# Patient Record
Sex: FEMALE | Race: WHITE | Employment: OTHER | URBAN - METROPOLITAN AREA
[De-identification: names, ages, dates, MRNs, and addresses within clinical notes are randomized per-mention and may not be internally consistent; named-entity substitution may affect disease eponyms.]

---

## 2017-02-09 ENCOUNTER — OFFICE VISIT (OUTPATIENT)
Dept: INTERNAL MEDICINE CLINIC | Age: 62
End: 2017-02-09

## 2017-02-09 VITALS
HEART RATE: 88 BPM | OXYGEN SATURATION: 98 % | SYSTOLIC BLOOD PRESSURE: 130 MMHG | BODY MASS INDEX: 32.49 KG/M2 | HEIGHT: 65 IN | TEMPERATURE: 98.5 F | RESPIRATION RATE: 16 BRPM | WEIGHT: 195 LBS | DIASTOLIC BLOOD PRESSURE: 80 MMHG

## 2017-02-09 DIAGNOSIS — R10.32 LLQ ABDOMINAL PAIN: Primary | ICD-10-CM

## 2017-02-09 NOTE — MR AVS SNAPSHOT
Visit Information Date & Time Provider Department Dept. Phone Encounter #  
 2/9/2017  1:30 PM MD Suzanne [facility]gs [facility] [phone] [id number] Follow-up Instructions Return in about 1 day (around 2/10/2017) for F/U tests. Your Appointments 2/28/2017 11:20 AM  
ROUTINE CARE with MD Suzanne Flores [facility] ([address]) Appt Note: 6m f.u; 6m f.u  
 [address] [place] [address]  
[address], [place] [address] [id number] Upcoming Health Maintenance Date Due Pneumococcal 19-64 Highest Risk (1 of 3 - PCV13) 3/9/1976 DTaP/Tdap/Td series (1 - Tdap) 3/9/1978 INFLUENZA AGE 9 TO ADULT 8/1/2016 BREAST CANCER SCRN MAMMOGRAM 8/3/2016 PAP AKA CERVICAL CYTOLOGY 5/1/2017 COLONOSCOPY 2/27/2020 Allergies as of 2/9/2017  Review Complete On: 2/9/2017 By: Santino Castillo MD  
 No Known Allergies Current Immunizations  Reviewed on 12/9/2015 Name Date Influenza Vaccine 9/25/2014, 9/9/2013 Influenza Vaccine (Quad) PF 12/9/2015 Not reviewed this visit You Were Diagnosed With   
  
 Codes Comments LLQ abdominal pain    -  Primary ICD-10-CM: R10.32 
ICD-9-CM: 789.04 Vitals BP Pulse Temp Resp Height(growth percentile) Weight(growth percentile) 130/80 (BP 1 Location: Left arm, BP Patient Position: Sitting) 88 98.5 °F (36.9 °C) (Oral) 16 5' 5\" (1.651 m) 195 lb (88.5 kg) SpO2 BMI OB Status Smoking Status 98% 32.45 kg/m2 Postmenopausal Former Smoker Vitals History BMI and BSA Data Body Mass Index Body Surface Area  
 32.45 kg/m 2 2.01 m 2 Preferred Pharmacy Pharmacy Name Phone [id number], [address] [place] [address] [phone] Your Updated Medication List  
  
   
This list is accurate as of: 2/9/17  2:03 PM.  Always use your most recent med list. ADVIL 200 mg tablet Generic drug:  ibuprofen Take  by mouth. atorvastatin 10 mg tablet Commonly known as:  LIPITOR  
TAKE 1 TABLET BY MOUTH EVERY DAY  
  
 CALTRATE 600 + D PO Take  by mouth daily. citalopram 10 mg tablet Commonly known as:  CELEXA  
TAKE 1 TABLET BY MOUTH EVERY DAY  
  
 doxycycline 100 mg capsule Commonly known as:  VIBRAMYCIN Take 100 mg by mouth daily. famotidine 20 mg tablet Commonly known as:  PEPCID Take 1 Tab by mouth daily (after breakfast). metroNIDAZOLE 0.75 % topical cream  
Commonly known as:  METROCREAM  
Apply  to affected area two (2) times a day. VITAMIN D3 PO Take 1,000 Units by mouth daily. ZyrTEC 10 mg tablet Generic drug:  cetirizine Take  by mouth as needed. We Performed the Following CBC WITH AUTOMATED DIFF [10303 CPT(R)] METABOLIC PANEL, COMPREHENSIVE [59969 CPT(R)] URINALYSIS W/ RFLX MICROSCOPIC [56286 CPT(R)] Follow-up Instructions Return in about 1 day (around 2/10/2017) for F/U tests. To-Do List   
 02/09/2017 Imaging:  US ABD COMP Patient Instructions For your abdominal pain you need to have an ultrasound and blood / urine tests. Introducing [facility]! Cristy Lorenz introduces [employer] patient portal. Now you can access parts of your medical record, email your doctor's office, and request medication refills online. 1. In your internet browser, go to [email or web address] 2. Click on the First Time User? Click Here link in the Sign In box. You will see the New Member Sign Up page. 3. Enter your [employer] Access Code exactly as it appears below. You will not need to use this code after youve completed the sign-up process. If you do not sign up before the expiration date, you must request a new code. · [employer] Access Code: [email or web address] Expires: 5/10/2017  2:02 PM 
 
 4. Enter the last four digits of your Social Security Number (xxxx) and Date of Birth (mm/dd/yyyy) as indicated and click Submit. You will be taken to the next sign-up page. 5. Create a CardKill ID. This will be your CardKill login ID and cannot be changed, so think of one that is secure and easy to remember. 6. Create a CardKill password. You can change your password at any time. 7. Enter your Password Reset Question and Answer. This can be used at a later time if you forget your password. 8. Enter your e-mail address. You will receive e-mail notification when new information is available in [address] 9. Click Sign Up. You can now view and download portions of your medical record. 10. Click the Download Summary menu link to download a portable copy of your medical information. If you have questions, please visit the Frequently Asked Questions section of the [employer] website. Remember, [employer]Kill is NOT to be used for urgent needs. For medical emergencies, dial 911. Now available from your iPhone and Android! Please provide this summary of care documentation to your next provider. Your primary care clinician is listed as Romi Fabian. If you have any questions after today's visit, please call [phone].

## 2017-02-09 NOTE — PROGRESS NOTES
Subjective:     Chief Complaint   Patient presents with    Abdominal Pain     pressure x 3 weeks associated with hip pain      She  is a 63y.o. year old female who presents for evaluation. 3 weeks ago got pain in bilateral lower abdomen (like period pain). Now feels like pressure in lower abdomen and an intermittent sharp pain in LLQ. Had last colonoscopy in 12/2015 and no diverticula reported. When going from sitting to standing has a lot of pain. No change in BM's. No urinary symptoms. No previous GYN surgery except for tubal ligation. Historical Data    Past Medical History   Diagnosis Date    Cancer [facility]) 2005     breast (right)    Lipoma 11/18/2013        Past Surgical History   Procedure Laterality Date    Hx breast lumpectomy       s/p RTx (x7 weeks)    Hx cholecystectomy  1997    Hx tubal ligation  1996    Hx urological  2013     sling    Hx colonoscopy  2009 & 2/27/15     polyps 1st time, 2nd time normal - repeat 5 years - Dr. Paula Real Prescriptions as of 2/9/2017   Medication Sig Dispense Refill    citalopram (CELEXA) 10 mg tablet TAKE 1 TABLET BY MOUTH EVERY DAY 30 Tab 5    atorvastatin (LIPITOR) 10 mg tablet TAKE 1 TABLET BY MOUTH EVERY DAY 30 Tab 5    famotidine (PEPCID) 20 mg tablet Take 1 Tab by mouth daily (after breakfast). 30 Tab 3    ibuprofen (ADVIL) 200 mg tablet Take  by mouth.  CALCIUM CARBONATE/VITAMIN D3 (CALTRATE 600 + D PO) Take  by mouth daily.  CHOLECALCIFEROL, VITAMIN D3, (VITAMIN D3 PO) Take 1,000 Units by mouth daily.  cetirizine (ZYRTEC) 10 mg tablet Take  by mouth as needed.  doxycycline (VIBRAMYCIN) 100 mg capsule Take 100 mg by mouth daily. 2    metroNIDAZOLE (METROCREAM) 0.75 % topical cream Apply  to affected area two (2) times a day. 5     No facility-administered encounter medications on file as of 2/9/2017.          No Known Allergies     Social History     Social History    Marital status:  Spouse name: N/A    Number of children: N/A    Years of education: N/A     Occupational History    Not on file. Social History Main Topics    Smoking status: Former Smoker     Packs/day: 0.50     Years: 15.00     Quit date: 12/13/1993    Smokeless tobacco: Never Used    Alcohol use Yes      Comment: occ    Drug use: No    Sexual activity: Yes     Partners: Male     Other Topics Concern    Not on file     Social History Narrative        Review of Systems  Pertinent items are noted in HPI. Objective:     Vitals:    02/09/17 1345   BP: 130/80   Pulse: 88   Resp: 16   Temp: 98.5 °F (36.9 °C)   TempSrc: Oral   SpO2: 98%   Weight: 195 lb (88.5 kg)   Height: 5' 5\" (1.651 m)     Pleasant female in no acute distress. Cardiac: RRR without murmurs, gallops or rubs. Lungs:  Clear to auscultation. Abdomen:  Slightly distended. Tender to palpation in LLQ with vague fullness but no masses. No rebound. Back: No CVA tenderness. ASSESSMENT / PLAN:   1. LLQ abdominal pain  · Most likely is acute diverticulitis (although none seen on last colonoscopy). · Consider infectious etiologies, ovarian mass. Doubt vascular. - US ABD COMP; Future  - CBC WITH AUTOMATED DIFF  - URINALYSIS W/ RFLX MICROSCOPIC  - METABOLIC PANEL, COMPREHENSIVE    Patient Instructions   For your abdominal pain you need to have an ultrasound and blood / urine tests. Follow-up Disposition:  Return in about 1 day (around 2/10/2017) for F/U tests. Advised her to call back or return to office if symptoms worsen/change/persist.  Discussed expected course/resolution/complications of diagnosis in detail with patient. Medication risks/benefits/costs/interactions/alternatives discussed with patient. She was given an after visit summary which includes diagnoses, current medications, & vitals. She expressed understanding with the diagnosis and plan.

## 2017-02-09 NOTE — PROGRESS NOTES
Chief Complaint   Patient presents with    Abdominal Pain     pressure x 3 weeks associated with hip pain

## 2017-02-10 ENCOUNTER — OFFICE VISIT (OUTPATIENT)
Dept: INTERNAL MEDICINE CLINIC | Age: 62
End: 2017-02-10

## 2017-02-10 ENCOUNTER — HOSPITAL ENCOUNTER (OUTPATIENT)
Dept: ULTRASOUND IMAGING | Age: 62
Discharge: HOME | End: 2017-02-10
Payer: COMMERCIAL

## 2017-02-10 VITALS
HEIGHT: 65 IN | BODY MASS INDEX: 32.15 KG/M2 | DIASTOLIC BLOOD PRESSURE: 80 MMHG | SYSTOLIC BLOOD PRESSURE: 144 MMHG | OXYGEN SATURATION: 97 % | HEART RATE: 75 BPM | TEMPERATURE: 98.5 F | RESPIRATION RATE: 16 BRPM | WEIGHT: 193 LBS

## 2017-02-10 DIAGNOSIS — R10.32 LLQ ABDOMINAL PAIN: ICD-10-CM

## 2017-02-10 DIAGNOSIS — R10.2 SUPRAPUBIC ABDOMINAL PAIN: Primary | ICD-10-CM

## 2017-02-10 LAB
ALBUMIN SERPL-MCNC: 4 G/DL (ref 3.5–5.5)
ALBUMIN/GLOB SERPL: 1.9 {RATIO} (ref 1.1–2.5)
ALP SERPL-CCNC: 118 IU/L (ref 39–117)
ALT SERPL-CCNC: 48 IU/L (ref 0–32)
APPEARANCE UR: CLEAR
AST SERPL-CCNC: 29 IU/L (ref 0–40)
BACTERIA #/AREA URNS HPF: NORMAL /[HPF]
BASOPHILS # BLD AUTO: 0 X10E3/UL (ref 0–0.2)
BASOPHILS NFR BLD AUTO: 1 %
BILIRUB SERPL-MCNC: <0.2 MG/DL (ref 0–1.2)
BILIRUB UR QL STRIP: NEGATIVE
BUN SERPL-MCNC: 15 MG/DL (ref 6–24)
BUN/CREAT SERPL: 25 (ref 9–23)
CALCIUM SERPL-MCNC: 9.2 MG/DL (ref 8.7–10.2)
CASTS URNS QL MICRO: NORMAL /LPF
CHLORIDE SERPL-SCNC: 105 MMOL/L (ref 96–106)
CO2 SERPL-SCNC: 24 MMOL/L (ref 18–29)
COLOR UR: YELLOW
CREAT SERPL-MCNC: 0.61 MG/DL (ref 0.57–1)
EOSINOPHIL # BLD AUTO: 0.1 X10E3/UL (ref 0–0.4)
EOSINOPHIL NFR BLD AUTO: 2 %
EPI CELLS #/AREA URNS HPF: NORMAL /HPF
ERYTHROCYTE [DISTWIDTH] IN BLOOD BY AUTOMATED COUNT: 13.1 % (ref 12.3–15.4)
GLOBULIN SER CALC-MCNC: 2.1 G/DL (ref 1.5–4.5)
GLUCOSE SERPL-MCNC: 111 MG/DL (ref 65–99)
GLUCOSE UR QL: NEGATIVE
HCT VFR BLD AUTO: 43 % (ref 34–46.6)
HGB BLD-MCNC: 14.3 G/DL (ref 11.1–15.9)
HGB UR QL STRIP: NEGATIVE
IMM GRANULOCYTES # BLD: 0 X10E3/UL (ref 0–0.1)
IMM GRANULOCYTES NFR BLD: 0 %
KETONES UR QL STRIP: NEGATIVE
LEUKOCYTE ESTERASE UR QL STRIP: ABNORMAL
LYMPHOCYTES # BLD AUTO: 2.6 X10E3/UL (ref 0.7–3.1)
LYMPHOCYTES NFR BLD AUTO: 40 %
MCH RBC QN AUTO: 31.2 PG (ref 26.6–33)
MCHC RBC AUTO-ENTMCNC: 33.3 G/DL (ref 31.5–35.7)
MCV RBC AUTO: 94 FL (ref 79–97)
MICRO URNS: ABNORMAL
MONOCYTES # BLD AUTO: 0.4 X10E3/UL (ref 0.1–0.9)
MONOCYTES NFR BLD AUTO: 7 %
NEUTROPHILS # BLD AUTO: 3.3 X10E3/UL (ref 1.4–7)
NEUTROPHILS NFR BLD AUTO: 50 %
NITRITE UR QL STRIP: NEGATIVE
PH UR STRIP: 7 [PH] (ref 5–7.5)
PLATELET # BLD AUTO: 265 X10E3/UL (ref 150–379)
POTASSIUM SERPL-SCNC: 3.7 MMOL/L (ref 3.5–5.2)
PROT SERPL-MCNC: 6.1 G/DL (ref 6–8.5)
PROT UR QL STRIP: NEGATIVE
RBC # BLD AUTO: 4.59 X10E6/UL (ref 3.77–5.28)
RBC #/AREA URNS HPF: NORMAL /HPF
SODIUM SERPL-SCNC: 145 MMOL/L (ref 134–144)
SP GR UR: 1.01 (ref 1–1.03)
UROBILINOGEN UR STRIP-MCNC: 0.2 MG/DL (ref 0.2–1)
WBC # BLD AUTO: 6.5 X10E3/UL (ref 3.4–10.8)
WBC #/AREA URNS HPF: NORMAL /HPF

## 2017-02-10 PROCEDURE — 76830 TRANSVAGINAL US NON-OB: CPT

## 2017-02-10 PROCEDURE — 76856 US EXAM PELVIC COMPLETE: CPT

## 2017-02-10 NOTE — PROGRESS NOTES
Subjective:     Chief Complaint   Patient presents with    Abdominal Pain     follow up      She  is a 63y.o. year old female who presents for evaluation. Here for follow up of tests for lower abdominal pain. Blood work analysis was all WNL. US shows some fluid in the endocervical canal and GYN recommended. Historical Data    Past Medical History   Diagnosis Date    Cancer [facility]) 2005     breast (right)    Lipoma 11/18/2013        Past Surgical History   Procedure Laterality Date    Hx breast lumpectomy       s/p RTx (x7 weeks)    Hx cholecystectomy  1997    Hx tubal ligation  1996    Hx urological  2013     sling    Hx colonoscopy  2009 & 2/27/15     polyps 1st time, 2nd time normal - repeat 5 years - Dr. Sonido Saxena Prescriptions as of 2/10/2017   Medication Sig Dispense Refill    citalopram (CELEXA) 10 mg tablet TAKE 1 TABLET BY MOUTH EVERY DAY 30 Tab 5    atorvastatin (LIPITOR) 10 mg tablet TAKE 1 TABLET BY MOUTH EVERY DAY 30 Tab 5    doxycycline (VIBRAMYCIN) 100 mg capsule Take 100 mg by mouth daily. 2    metroNIDAZOLE (METROCREAM) 0.75 % topical cream Apply  to affected area two (2) times a day. 5    famotidine (PEPCID) 20 mg tablet Take 1 Tab by mouth daily (after breakfast). 30 Tab 3    ibuprofen (ADVIL) 200 mg tablet Take  by mouth.  CALCIUM CARBONATE/VITAMIN D3 (CALTRATE 600 + D PO) Take  by mouth daily.  CHOLECALCIFEROL, VITAMIN D3, (VITAMIN D3 PO) Take 1,000 Units by mouth daily.  cetirizine (ZYRTEC) 10 mg tablet Take  by mouth as needed. No facility-administered encounter medications on file as of 2/10/2017. No Known Allergies     Social History     Social History    Marital status:      Spouse name: N/A    Number of children: N/A    Years of education: N/A     Occupational History    Not on file.      Social History Main Topics    Smoking status: Former Smoker     Packs/day: 0.50     Years: 15.00     Quit date: 12/13/1993    Smokeless tobacco: Never Used    Alcohol use Yes      Comment: occ    Drug use: No    Sexual activity: Yes     Partners: Male     Other Topics Concern    Not on file     Social History Narrative        Review of Systems  Pertinent items are noted in HPI. Objective:     Vitals:    02/10/17 1305   BP: 144/80   Pulse: 75   Resp: 16   Temp: 98.5 °F (36.9 °C)   TempSrc: Oral   SpO2: 97%   Weight: 193 lb (87.5 kg)   Height: 5' 5\" (1.651 m)     Not re-examined. ASSESSMENT / PLAN:   1. Suprapubic abdominal pain  · Blood tests show no sign of infection, etc.  · US shows some fluid in endometrial canal   · Refer to GYN      See GYN           Follow-up Disposition:  Return if symptoms worsen or fail to improve. Advised her to call back or return to office if symptoms worsen/change/persist.  Discussed expected course/resolution/complications of diagnosis in detail with patient. Medication risks/benefits/costs/interactions/alternatives discussed with patient. She was given an after visit summary which includes diagnoses, current medications, & vitals. She expressed understanding with the diagnosis and plan.

## 2017-02-28 ENCOUNTER — OFFICE VISIT (OUTPATIENT)
Dept: INTERNAL MEDICINE CLINIC | Age: 62
End: 2017-02-28

## 2017-02-28 VITALS
DIASTOLIC BLOOD PRESSURE: 82 MMHG | BODY MASS INDEX: 32.15 KG/M2 | RESPIRATION RATE: 16 BRPM | WEIGHT: 193 LBS | TEMPERATURE: 97.3 F | SYSTOLIC BLOOD PRESSURE: 132 MMHG | HEIGHT: 65 IN | OXYGEN SATURATION: 98 % | HEART RATE: 65 BPM

## 2017-02-28 DIAGNOSIS — F32.A DEPRESSION, UNSPECIFIED DEPRESSION TYPE: Primary | ICD-10-CM

## 2017-02-28 DIAGNOSIS — E78.2 MIXED HYPERLIPIDEMIA: ICD-10-CM

## 2017-02-28 DIAGNOSIS — R74.8 ABNORMAL LIVER ENZYMES: ICD-10-CM

## 2017-02-28 DIAGNOSIS — R06.83 SNORING: ICD-10-CM

## 2017-02-28 DIAGNOSIS — G47.9 SLEEP DISTURBANCE: ICD-10-CM

## 2017-02-28 DIAGNOSIS — Z79.899 ENCOUNTER FOR LONG-TERM (CURRENT) DRUG USE: ICD-10-CM

## 2017-02-28 DIAGNOSIS — R73.09 ELEVATED GLUCOSE: ICD-10-CM

## 2017-02-28 PROBLEM — E66.9 OBESITY (BMI 30-39.9): Status: ACTIVE | Noted: 2017-02-28

## 2017-02-28 NOTE — MR AVS SNAPSHOT
Visit Information Date & Time Provider Department Dept. Phone Encounter #  
 2/28/2017 11:20 AM Mayte Moise MD [place] [address] [facility] [phone] [id number] Follow-up Instructions Return in about 4 months (around 6/28/2017). Upcoming Health Maintenance Date Due Pneumococcal 19-64 Highest Risk (1 of 3 - PCV13) 3/9/1976 DTaP/Tdap/Td series (1 - Tdap) 3/9/1978 BREAST CANCER SCRN MAMMOGRAM 8/3/2016 PAP AKA CERVICAL CYTOLOGY 5/1/2017 COLONOSCOPY 2/27/2020 Allergies as of 2/28/2017  Review Complete On: 2/28/2017 By: Mayte Moise MD  
 No Known Allergies Current Immunizations  Reviewed on 12/9/2015 Name Date Influenza Vaccine 9/25/2014, 9/9/2013 Influenza Vaccine (Quad) PF 12/9/2015 Not reviewed this visit You Were Diagnosed With   
  
 Codes Comments Depression, unspecified depression type    -  Primary ICD-10-CM: F32.9 ICD-9-CM: 495 Mixed hyperlipidemia     ICD-10-CM: E78.2 ICD-9-CM: 272.2 Elevated glucose     ICD-10-CM: R73.09 
ICD-9-CM: 790.29 Abnormal liver enzymes     ICD-10-CM: R74.8 ICD-9-CM: 790.5 Encounter for long-term (current) drug use     ICD-10-CM: Z79.899 ICD-9-CM: V58.69 Snoring     ICD-10-CM: R06.83 
ICD-9-CM: 786.09 Sleep disturbance     ICD-10-CM: G47.9 ICD-9-CM: 780.50 Vitals BP  
  
  
  
  
  
 132/82 (BP 1 Location: Right arm, BP Patient Position: Sitting) Vitals History BMI and BSA Data Body Mass Index Body Surface Area  
 32.12 kg/m 2 2 m 2 Preferred Pharmacy Pharmacy Name Phone [id number] [place] [address] [phone] Your Updated Medication List  
  
   
This list is accurate as of: 2/28/17 12:08 PM.  Always use your most recent med list. ADVIL 200 mg tablet Generic drug:  ibuprofen Take  by mouth. atorvastatin 10 mg tablet Commonly known as:  LIPITOR TAKE 1 TABLET BY MOUTH EVERY DAY  
  
 CALTRATE 600 + D PO Take  by mouth daily. citalopram 10 mg tablet Commonly known as:  CELEXA  
TAKE 1 TABLET BY MOUTH EVERY DAY  
  
 famotidine 20 mg tablet Commonly known as:  PEPCID Take 1 Tab by mouth daily (after breakfast). VITAMIN D3 PO Take 1,000 Units by mouth daily. ZyrTEC 10 mg tablet Generic drug:  cetirizine Take  by mouth as needed. We Performed the Following SLEEP MEDICINE REFERRAL [PVD392 Custom] Comments:  
 Please evaluate patient for possible sleep apnea. Follow-up Instructions Return in about 4 months (around 6/28/2017). To-Do List   
 02/28/2017 Imaging:  US ABD LTD Referral Information Referral ID Referred By Referred To [id number] JERRELL NAVARRETE Not Available Visits Status Start Date End Date 1 New Request 2/28/17 2/28/18 If your referral has a status of pending review or denied, additional information will be sent to support the outcome of this decision. Referral ID Referred By Referred To [id number] ROSALBA [address] [place] [address], MD  
   [address] [place] [address], [address] Phone: [phone] Fax: [phone] Visits Status Start Date End Date 1 New Request 2/28/17 2/28/18 If your referral has a status of pending review or denied, additional information will be sent to support the outcome of this decision. Introducing [facility]! Kiki Win introduces [employer] patient portal. Now you can access parts of your medical record, email your doctor's office, and request medication refills online. 1. In your internet browser, go to [email or web address] 2. Click on the First Time User? Click Here link in the Sign In box. You will see the New Member Sign Up page. 3. Enter your [employer] Access Code exactly as it appears below.  You will not need to use this code after youve completed the sign-up process. If you do not sign up before the expiration date, you must request a new code. · [employer] Access Code: [email or web address] Expires: 5/10/2017  2:02 PM 
 
4. Enter the last four digits of your Social Security Number (xxxx) and Date of Birth (mm/dd/yyyy) as indicated and click Submit. You will be taken to the next sign-up page. 5. Create a [employer] ID. This will be your [employer] login ID and cannot be changed, so think of one that is secure and easy to remember. 6. Create a [employer] password. You can change your password at any time. 7. Enter your Password Reset Question and Answer. This can be used at a later time if you forget your password. 8. Enter your e-mail address. You will receive e-mail notification when new information is available in [address] 9. Click Sign Up. You can now view and download portions of your medical record. 10. Click the Download Summary menu link to download a portable copy of your medical information. If you have questions, please visit the Frequently Asked Questions section of the [employer] website. Remember, [employer] is NOT to be used for urgent needs. For medical emergencies, dial 911. Now available from your iPhone and Android! Please provide this summary of care documentation to your next provider. Your primary care clinician is listed as Romi Fabian. If you have any questions after today's visit, please call [phone].

## 2017-02-28 NOTE — PROGRESS NOTES
Mio Hernandez is a 63 y.o. female    Chief Complaint   Patient presents with    Follow-up     6 month f/up for hyperlipidemia, elevated glucose, depression     1. Have you been to the ER, urgent care clinic since your last visit? Hospitalized since your last visit? No    2. Have you seen or consulted any other health care providers outside of the [address] [place] [address] since your last visit? Include any pap smears or colon screening.  Yes, Dr. Fide Nichole

## 2017-02-28 NOTE — PROGRESS NOTES
Subjective:      Bryce Dunn is a 63 y.o. female who presents today for follow up of her depression, hyperlipidemia, elevated glucose and also elevated liver enzymes. She saw Dr. Radha Flores a couple weeks ago for suprapubic abdominal pain. Ultrasound showed fluid in the endometrial canal and patient was referred for further evaluation by her gynecologist.  Her gyn is Dr. Akua Herron, but she was not available,  She saw her colleague. Patient did not remember provider's name. Had a uterine biopsy last week with gyn and she was told it was normal.  She was given medrol dose pack and she finished it yesterday and the pain resolved. She thought it was maybe muscular pain. She was told she may need PT. Patient reports of being tired. She is stressed from work. Not sleeping well. Snoring considerably. Her labs done 2 weeks ago also showed that her liver enzymes were still elevated as was her glucose. She states that she does eat a low fat diet and is taking her lipitor daily. No tylenol use or alcohol use. Patient Active Problem List   Diagnosis Code    History of screening mammography Z92.89    Breast cancer ([facility] [address] C50.919    Pap smear for cervical cancer screening Z12.4    Colon cancer screening Z12.11    S/P cholecystectomy Z90.49    S/P tubal ligation Z98.51    Menopausal state N95.1    H/O bone density study Z92.89    Lipoma D17.9    Hyperlipemia E78.5    Depression F32.9    Obesity (BMI 30-39. 9) E66.9     Current Outpatient Prescriptions   Medication Sig Dispense Refill    citalopram (CELEXA) 10 mg tablet TAKE 1 TABLET BY MOUTH EVERY DAY 30 Tab 5    atorvastatin (LIPITOR) 10 mg tablet TAKE 1 TABLET BY MOUTH EVERY DAY 30 Tab 5    CALCIUM CARBONATE/VITAMIN D3 (CALTRATE 600 + D PO) Take  by mouth daily.  CHOLECALCIFEROL, VITAMIN D3, (VITAMIN D3 PO) Take 1,000 Units by mouth daily.  famotidine (PEPCID) 20 mg tablet Take 1 Tab by mouth daily (after breakfast).  30 Tab 3    ibuprofen (ADVIL) 200 mg tablet Take  by mouth.  cetirizine (ZYRTEC) 10 mg tablet Take  by mouth as needed. Review of Systems    Pertinent items are noted in HPI. Objective:     Visit Vitals    /82 (BP 1 Location: Right arm, BP Patient Position: Sitting)    Pulse 65    Temp 97.3 °F (36.3 °C) (Oral)    Resp 16    Ht 5' 5\" (1.651 m)    Wt 193 lb (87.5 kg)    SpO2 98%    BMI 32.12 kg/m2     General appearance: alert, cooperative, no distress, appears stated age  Head: Normocephalic, without obvious abnormality, atraumatic  Neck: supple, symmetrical, trachea midline, no adenopathy, no carotid bruit and no JVD  Lungs: clear to auscultation bilaterally  Heart: regular rate and rhythm, S1, S2 normal, no murmur, click, rub or gallop  Abdomen: soft, non-tender. Bowel sounds normal. No masses,  no organomegaly  Extremities: extremities normal, atraumatic, no cyanosis or edema  Pulses: 2+ and symmetric    Assessment/Plan:     1. Depression, unspecified depression type  -stressed at work  -compliant with use of celexa 10mg daily  -recommended counseling with available counselors at work for coping skills    2. Mixed hyperlipidemia  -LDL 71 noted in labs done in 7/2016 with use of lipitor 10mg daily. 3. Elevated glucose  -encouraged low sugar and carbohydrate diet  -need hemoglobin A1c with next labs    4. Abnormal liver enzymes  -likely fatty liver. Will get ultrasound    - US ABD LTD; Future    5. Encounter for long-term (current) drug use      6. Snoring  -with sleep interruption. Recommended evaluation by sleep specialist.  Likely sleep apnea. - SLEEP MEDICINE REFERRAL    7.  Sleep disturbance    - SLEEP MEDICINE REFERRAL     Orders Placed This Encounter    US ABD LTD     Standing Status:   Future     Standing Expiration Date:   3/28/2018     Order Specific Question:   Specific Body Part     Answer:   liver    SLEEP MEDICINE REFERRAL     Referral Priority:   Routine     Referral Type: Consultation     Referral Reason:   Specialty Services Required     Referred to Provider:   Teresa Luther MD        Follow-up Disposition:     Follow up 4 months      Return if symptoms worsen or fail to improve. Advised patient to call back or return to office if symptoms worsen/change/persist.     Discussed expected course/resolution/complications of diagnosis in detail with patient. Medication risks/benefits/costs/interactions/alternatives discussed with patient. Patient was given an after visit summary which includes diagnoses, current medications, & vitals. Patient expressed understanding with the diagnosis and plan.

## 2017-03-15 ENCOUNTER — HOSPITAL ENCOUNTER (OUTPATIENT)
Dept: ULTRASOUND IMAGING | Age: 62
Discharge: HOME | End: 2017-03-15
Payer: COMMERCIAL

## 2017-03-15 ENCOUNTER — TELEPHONE (OUTPATIENT)
Dept: INTERNAL MEDICINE CLINIC | Age: 62
End: 2017-03-15

## 2017-03-15 DIAGNOSIS — R74.8 ABNORMAL LIVER ENZYMES: ICD-10-CM

## 2017-03-15 PROCEDURE — 76705 ECHO EXAM OF ABDOMEN: CPT

## 2017-03-15 NOTE — TELEPHONE ENCOUNTER
Discussed with patient that her liver showed steatosis. She does not drink alcohol. Plan: encouraged her to work on weight reduction. Maintain low fat diet. She is already on lipitor for hyperlipidemia. Recommended she start vitamin E 400mg daily, but most importantly, she needs to work on reducing her weight.     She has follow up in June, 2017 with me

## 2017-03-20 ENCOUNTER — OFFICE VISIT (OUTPATIENT)
Dept: SLEEP MEDICINE | Age: 62
End: 2017-03-20

## 2017-03-20 VITALS
DIASTOLIC BLOOD PRESSURE: 72 MMHG | OXYGEN SATURATION: 97 % | WEIGHT: 191 LBS | HEART RATE: 74 BPM | BODY MASS INDEX: 31.82 KG/M2 | SYSTOLIC BLOOD PRESSURE: 112 MMHG | HEIGHT: 65 IN

## 2017-03-20 DIAGNOSIS — G47.33 OSA (OBSTRUCTIVE SLEEP APNEA): Primary | ICD-10-CM

## 2017-03-20 DIAGNOSIS — Z86.59 H/O: DEPRESSION: ICD-10-CM

## 2017-03-20 RX ORDER — DOXYCYCLINE HYCLATE 50 MG/1
CAPSULE ORAL
Refills: 2 | COMMUNITY
Start: 2017-03-10 | End: 2017-06-28 | Stop reason: ALTCHOICE

## 2017-03-20 RX ORDER — AMOXICILLIN 500 MG/1
CAPSULE ORAL
Refills: 0 | COMMUNITY
Start: 2017-03-10 | End: 2017-11-29 | Stop reason: ALTCHOICE

## 2017-03-20 NOTE — PATIENT INSTRUCTIONS
[address] [facility] Ravi. [place], [address]  Tel.  [phone]  Fax. 100 [facility] 60  [place], [address] [facility]  Tel.  [phone]  Fax. [phone] [address]  Tel.  [phone]  Fax. [phone]     Sleep Apnea: After Your Visit  Your Care Instructions  Sleep apnea occurs when you frequently stop breathing for 10 seconds or longer during sleep. It can be mild to severe, based on the number of times per hour that you stop breathing or have slowed breathing. Blocked or narrowed airways in your nose, mouth, or throat can cause sleep apnea. Your airway can become blocked when your throat muscles and tongue relax during sleep. Sleep apnea is common, occurring in 1 out of 20 individuals. Individuals having any of the following characteristics should be evaluated and treated right away due to high risk and detrimental consequences from untreated sleep apnea:  1. Obesity  2. Congestive Heart failure  3. Atrial Fibrillation  4. Uncontrolled Hypertension  5. Type II Diabetes  6. Night-time Arrhythmias  7. Stroke  8. Pulmonary Hypertension  9. High-risk Driving Populations (pilots, truck drivers, etc.)  10. Patients Considering Weight-loss Surgery    How do you know you have sleep apnea? You probably have sleep apnea if you answer 'yes' to 3 or more of the following questions:  S - Have you been told that you Snore? T - Are you often Tired during the day? O - Has anyone Observed you stop breathing while sleeping? P- Do you have (or are being treated for) high blood Pressure? B - Are you obese (Body Mass Index > 35)? A - Is your Age 48years old or older? N - Is your Neck size greater than 16 inches? G - Are you male Gender? A sleep physician can prescribe a breathing device that prevents tissues in the throat from blocking your airway.  Or your doctor may recommend using a dental device (oral breathing device) to help keep your airway open. In some cases, surgery may be needed to remove enlarged tissues in the throat. Follow-up care is a key part of your treatment and safety. Be sure to make and go to all appointments, and call your doctor if you are having problems. It's also a good idea to know your test results and keep a list of the medicines you take. How can you care for yourself at home? · Lose weight, if needed. It may reduce the number of times you stop breathing or have slowed breathing. · Go to bed at the same time every night. · Sleep on your side. It may stop mild apnea. If you tend to roll onto your back, sew a pocket in the back of your pajama top. Put a tennis ball into the pocket, and stitch the pocket shut. This will help keep you from sleeping on your back. · Avoid alcohol and medicines such as sleeping pills and sedatives before bed. · Do not smoke. Smoking can make sleep apnea worse. If you need help quitting, talk to your doctor about stop-smoking programs and medicines. These can increase your chances of quitting for good. · Prop up the head of your bed 4 to 6 inches by putting bricks under the legs of the bed. · Treat breathing problems, such as a stuffy nose, caused by a cold or allergies. · Use a continuous positive airway pressure (CPAP) breathing machine if lifestyle changes do not help your apnea and your doctor recommends it. The machine keeps your airway from closing when you sleep. · If CPAP does not help you, ask your doctor whether you should try other breathing machines. A bilevel positive airway pressure machine has two types of air pressureâone for breathing in and one for breathing out. Another device raises or lowers air pressure as needed while you breathe. · If your nose feels dry or bleeds when using one of these machines, talk with your doctor about increasing moisture in the air. A humidifier may help.   · If your nose is runny or stuffy from using a breathing machine, talk with your doctor about using decongestants or a corticosteroid nasal spray. When should you call for help? Watch closely for changes in your health, and be sure to contact your doctor if:  · You still have sleep apnea even though you have made lifestyle changes. · You are thinking of trying a device such as CPAP. · You are having problems using a CPAP or similar machine. Where can you learn more? Go to Zenph. Enter F150 in the search box to learn more about \"Sleep Apnea: After Your Visit. \"   © 4474-7060 Healthwise, [employer]. Care instructions adapted under license by Savanna Cruz (which disclaims liability or warranty for this information). This care instruction is for use with your licensed healthcare professional. If you have questions about a medical condition or this instruction, always ask your healthcare professional. Kim Cowing any warranty or liability for your use of this information. PROPER SLEEP HYGIENE    What to avoid  · Do not have drinks with caffeine, such as coffee or black tea, for 8 hours before bed. · Do not smoke or use other types of tobacco near bedtime. Nicotine is a stimulant and can keep you awake. · Avoid drinking alcohol late in the evening, because it can cause you to wake in the middle of the night. · Do not eat a big meal close to bedtime. If you are hungry, eat a light snack. · Do not drink a lot of water close to bedtime, because the need to urinate may wake you up during the night. · Do not read or watch TV in bed. Use the bed only for sleeping and sexual activity. What to try  · Go to bed at the same time every night, and wake up at the same time every morning. Do not take naps during the day. · Keep your bedroom quiet, dark, and cool. · Get regular exercise, but not within 3 to 4 hours of your bedtime. .  · Sleep on a comfortable pillow and mattress.   · If watching the clock makes you anxious, turn it facing away from you so you cannot see the time. · If you worry when you lie down, start a worry book. Well before bedtime, write down your worries, and then set the book and your concerns aside. · Try meditation or other relaxation techniques before you go to bed. · If you cannot fall asleep, get up and go to another room until you feel sleepy. Do something relaxing. Repeat your bedtime routine before you go to bed again. · Make your house quiet and calm about an hour before bedtime. Turn down the lights, turn off the TV, log off the computer, and turn down the volume on music. This can help you relax after a busy day. Drowsy Driving  The [address] [place] [address] Road Traffic Safety Administration cites drowsiness as a causing factor in more than 553,477 police reported crashes annually, resulting in 76,000 injuries and 1,500 deaths. Other surveys suggest 55% of people polled have driven while drowsy in the past year, 23% had fallen asleep but not crashed, 3% crashed, and 2% had and accident due to drowsy driving. Who is at risk? Young Drivers: One study of drowsy driving accidents states that 55% of the drivers were under 25 years. Of those, 75% were male. Shift Workers and Travelers: People who work overnight or travel across time zones frequently are at higher risk of experiencing Circadian Rhythm Disorders. They are trying to work and function when their body is programed to sleep. Sleep Deprived: Lack of sleep has a serious impact on your ability to pay attention or focus on a task. Consistently getting less than the average of 8 hours your body needs creates partial or cumulative sleep deprivation. Untreated Sleep Disorders: Sleep Apnea, Narcolepsy, R.L.S., and other sleep disorders (untreated) prevent a person from getting enough restful sleep. This leads to excessive daytime sleepiness and increases the risk for drowsy driving accidents by up to 7 times.   Medications / Alcohol: Even over the counter medications can cause drowsiness. Medications that impair a drivers attention should have a warning label. Alcohol naturally makes you sleepy and on its own can cause accidents. Combined with excessive drowsiness its effects are amplified. Signs of Drowsy Driving:   * You don't remember driving the last few miles   * You may drift out of your chichi   * You are unable to focus and your thoughts wander   * You may yawn more often than normal   * You have difficulty keeping your eyes open / nodding off   * Missing traffic signs, speeding, or tailgating  Prevention-   Good sleep hygiene, lifestyle and behavioral choices have the most impact on drowsy driving. There is no substitute for sleep and the average person requires 8 hours nightly. If you find yourself driving drowsy, stop and sleep. Consider the sleep hygiene tips provided during your visit as well. Medication Refill Policy: Refills for all medications require 1 week advance notice. Please have your pharmacy fax a refill request. We are unable to fax, or call in \"controled substance\" medications and you will need to pick these prescriptions up from our office. Agency Spotter Activation    Thank you for requesting access to [employer]. Please follow the instructions below to securely access and download your online medical record. Agency Spotter allows you to send messages to your doctor, view your test results, renew your prescriptions, schedule appointments, and more. How Do I Sign Up? 1. In your internet browser, go to [email or web address] 2. Click on the First Time User? Click Here link in the Sign In box. You will see the New Member Sign Up page. 3. Enter your Agency Spotter Access Code exactly as it appears below. You will not need to use this code after youve completed the sign-up process. If you do not sign up before the expiration date, you must request a new code.     Agency Spotter Access Code: [email or web address]  Expires: 5/10/2017  3:02 PM (This is the date your JumpSoft access code will )    4. Enter the last four digits of your Social Security Number (xxxx) and Date of Birth (mm/dd/yyyy) as indicated and click Submit. You will be taken to the next sign-up page. 5. Create a Arachnot ID. This will be your JumpSoft login ID and cannot be changed, so think of one that is secure and easy to remember. 6. Create a JumpSoft password. You can change your password at any time. 7. Enter your Password Reset Question and Answer. This can be used at a later time if you forget your password. 8. Enter your e-mail address. You will receive e-mail notification when new information is available in [address] 9. Click Sign Up. You can now view and download portions of your medical record. 10. Click the Download Summary menu link to download a portable copy of your medical information. Additional Information    If you have questions, please call [phone]. Remember, JumpSoft is NOT to be used for urgent needs. For medical emergencies, dial 911.

## 2017-03-20 NOTE — PROGRESS NOTES
[address] [facility] [address] R[place]. [place], [address]  Tel.  [phone]  Fax. 100 [facility] 60  [place], 200 [address] [facility]  Tel.  [phone]  Fax. [phone] [facility] [facility]   Tel.  [phone]  Fax. [phone]         Subjective:      Shree Elizabeth Camacho is an 63 y.o. female referred for evaluation for a sleep disorder. She complains of snoring associated with periods of not breathing. Symptoms began 1 year ago, gradually worsening since that time. She usually can fall asleep in 10 minutes. Family or house members note snoring, periods of not breathing. She denies completely or partially paralyzed while falling asleep or waking up. Nhjennyfer Camacho does wake up frequently at night. She is not bothered by waking up too early and left unable to get back to sleep. She actually sleeps about 3 hours at night and wakes up about 3 times during the night. She does not work shifts:  . Briana Block indicates she does get too little sleep at night. Her bedtime is 2200. She awakens at 0530. She does not take naps. She has the following observed behaviors: Loud snoring, Pauses in breathing;  . Other remarks:   She denies of an urge to move extremities due to discomfort or other sensation and denies of dream enactment behavior. [place] Sleepiness Score: 17 which reflect severe daytime drowsiness.     No Known Allergies      Current Outpatient Prescriptions:     amoxicillin (AMOXIL) 500 mg capsule, TAKE 2 CAPSULES NOW THEN 1 CAPSULE 4 TIMES A DAY, Disp: , Rfl: 0    doxycycline (VIBRAMYCIN) 50 mg capsule, TAKE 2 CAPSULES BY MOUTH EVERY DAY, Disp: , Rfl: 2    citalopram (CELEXA) 10 mg tablet, TAKE 1 TABLET BY MOUTH EVERY DAY, Disp: 30 Tab, Rfl: 5    atorvastatin (LIPITOR) 10 mg tablet, TAKE 1 TABLET BY MOUTH EVERY DAY, Disp: 30 Tab, Rfl: 5    ibuprofen (ADVIL) 200 mg tablet, Take  by mouth., Disp: , Rfl:     CALCIUM CARBONATE/VITAMIN D3 (CALTRATE 600 + D PO), Take  by mouth daily. , Disp: , Rfl:     CHOLECALCIFEROL, VITAMIN D3, (VITAMIN D3 PO), Take 1,000 Units by mouth daily. , Disp: , Rfl:     cetirizine (ZYRTEC) 10 mg tablet, Take  by mouth as needed. , Disp: , Rfl:     famotidine (PEPCID) 20 mg tablet, Take 1 Tab by mouth daily (after breakfast). , Disp: 30 Tab, Rfl: 3     She  has a past [facility] of [facility] ([facility]); Ill-defined condition; and Lipoma (11/18/2013). She  has a past surgical history that includes breast lumpectomy; cholecystectomy (1997); tubal ligation (8593); urological (2013); and colonoscopy (2009 & 2/27/15). She family history includes Bleeding Prob in her paternal grandmother; Cancer in her maternal grandmother and paternal grandmother; Coronary Artery Disease in her father; Diabetes in her sister; Heart Attack in her father; Heart Disease in her father and maternal grandfather; Hypertension in her brother, brother, mother, and sister; Lung Disease in her paternal grandfather. There is no history of Anesth Problems. She  reports that she quit smoking about 23 years ago. She has a 7.50 pack-year smoking history. She has never used smokeless tobacco. She reports that she drinks alcohol. She reports that she does not use illicit drugs. Review of Systems:  Constitutional:  No significant weight loss or weight gain  Eyes:  No blurred vision  CVS:  No significant chest pain  Pulm:  No significant shortness of breath  GI:  No significant nausea or vomiting  :  significant nocturia  Musculoskeletal:  No significant joint pain at night  Skin:  No significant rashes  Neuro:  No significant dizziness   Psych:  No active mood issues    Sleep Review of Systems: notable for no difficulty falling asleep; frequent awakenings at night;  regular dreaming noted; no nightmares ; early morning headaches; no memory problems; no concentration issues; no history of any automobile or occupational accidents due to daytime drowsiness.       Objective: Visit Vitals    /72    Pulse 74    Ht 5' 5\" (1.651 m)    Wt 191 lb (86.6 kg)    SpO2 97%    BMI 31.78 kg/m2         General:   Not in acute distress   Eyes:  Anicteric sclerae, no obvious strabismus   Nose:  No obvious nasal septum deviation    Oropharynx:   Class 4 oropharyngeal outlet, thick tongue base, uvula could not be seen due to low-lying soft palate, narrow tonsilo-pharyngeal pilars   Tonsils:   tonsils are not seen due to low-lying soft palate   Neck:   Neck circ. in \"inches\": 14; midline trachea   Chest/Lungs:  Equal lung expansion, clear on auscultation    CVS:  Normal rate, regular rhythm; no JVD   Skin:  Warm to touch; no obvious rashes   Neuro:  No focal deficits ; no obvious tremor    Psych:  Normal affect,  normal countenance;          Assessment:       ICD-10-CM ICD-9-CM    1. SIMRAN (obstructive sleep apnea) G47.33 327.23 POLYSOMNOGRAPHY 1 NIGHT   2. BMI 31.0-31.9,adult Z68.31 V85.31    3. H/O: depression Z86.59 V11.8          Plan:     * The patient currently has a Moderate Risk for having sleep apnea. STOP-BANG score 4.  * Sleep testing was ordered for initial evaluation. * She was provided information on sleep apnea including coresponding risk factors and the importance of proper treatment. * Treatment options if indicated were reviewed today. Patient agrees to a trial of PAP therapy if indicated. * Counseling was provided regarding proper sleep hygiene (including effect of light on sleep), paradoxical intention, stimulus control, sleep environment safety and safe driving. * Effect of sleep disturbance on weight was reviewed. We have recommended a dedicated weight loss through appropriate diet and an exercise regiment as significant weight reduction has been shown to reduce severity of obstructive sleep apnea.      * Telephone [phone]  follow-up shortly after sleep study to review results and plan final management.     (patient has given permission for a message to be left regarding test results and further management if patient cannot be cannot be reached directly). Thank you for allowing us to participate in your patient's medical care. We'll keep you updated on these investigations. Elaine Gaspar MD, FAASM  Diplomate American Board of Sleep Medicine  Diplomate in Sleep Medicine - ABP  Electronically signed.

## 2017-03-21 RX ORDER — ATORVASTATIN CALCIUM 10 MG/1
TABLET, FILM COATED ORAL
Qty: 30 TAB | Refills: 5 | Status: SHIPPED | OUTPATIENT
Start: 2017-03-21 | End: 2017-09-21 | Stop reason: SDUPTHER

## 2017-03-21 RX ORDER — CITALOPRAM 10 MG/1
TABLET ORAL
Qty: 30 TAB | Refills: 5 | Status: SHIPPED | OUTPATIENT
Start: 2017-03-21 | End: 2017-09-21 | Stop reason: SDUPTHER

## 2017-04-28 ENCOUNTER — HOSPITAL ENCOUNTER (OUTPATIENT)
Dept: SLEEP MEDICINE | Age: 62
Discharge: HOME | End: 2017-04-28
Payer: COMMERCIAL

## 2017-04-28 VITALS
DIASTOLIC BLOOD PRESSURE: 76 MMHG | TEMPERATURE: 98.3 F | OXYGEN SATURATION: 96 % | WEIGHT: 190 LBS | SYSTOLIC BLOOD PRESSURE: 123 MMHG | HEART RATE: 74 BPM | BODY MASS INDEX: 29.82 KG/M2 | HEIGHT: 67 IN

## 2017-04-28 DIAGNOSIS — G47.33 OSA (OBSTRUCTIVE SLEEP APNEA): ICD-10-CM

## 2017-04-28 PROCEDURE — 95810 POLYSOM 6/> YRS 4/> PARAM: CPT | Performed by: INTERNAL MEDICINE

## 2017-05-01 ENCOUNTER — TELEPHONE (OUTPATIENT)
Dept: SLEEP MEDICINE | Age: 62
End: 2017-05-01

## 2017-05-01 ENCOUNTER — DOCUMENTATION ONLY (OUTPATIENT)
Dept: SLEEP MEDICINE | Age: 62
End: 2017-05-01

## 2017-05-01 DIAGNOSIS — G47.33 OSA (OBSTRUCTIVE SLEEP APNEA): Primary | ICD-10-CM

## 2017-05-02 PROBLEM — G47.33 OBSTRUCTIVE SLEEP APNEA SYNDROME: Status: ACTIVE | Noted: 2017-05-02

## 2017-06-28 ENCOUNTER — OFFICE VISIT (OUTPATIENT)
Dept: INTERNAL MEDICINE CLINIC | Age: 62
End: 2017-06-28

## 2017-06-28 VITALS
RESPIRATION RATE: 16 BRPM | HEIGHT: 67 IN | BODY MASS INDEX: 29.35 KG/M2 | HEART RATE: 69 BPM | SYSTOLIC BLOOD PRESSURE: 130 MMHG | WEIGHT: 187 LBS | DIASTOLIC BLOOD PRESSURE: 80 MMHG | TEMPERATURE: 97.5 F | OXYGEN SATURATION: 97 %

## 2017-06-28 DIAGNOSIS — Z79.899 ENCOUNTER FOR LONG-TERM (CURRENT) USE OF MEDICATIONS: ICD-10-CM

## 2017-06-28 DIAGNOSIS — E78.2 MIXED HYPERLIPIDEMIA: ICD-10-CM

## 2017-06-28 DIAGNOSIS — R25.2 CRAMPS OF LEFT LOWER EXTREMITY: ICD-10-CM

## 2017-06-28 DIAGNOSIS — F32.A DEPRESSION, UNSPECIFIED DEPRESSION TYPE: Primary | ICD-10-CM

## 2017-06-28 DIAGNOSIS — R73.09 ELEVATED GLUCOSE: ICD-10-CM

## 2017-06-28 PROBLEM — Z71.89 ADVANCE DIRECTIVE DISCUSSED WITH PATIENT: Chronic | Status: ACTIVE | Noted: 2017-06-28

## 2017-06-28 NOTE — PROGRESS NOTES
Subjective:      Jake Florentino is a 63 y.o. female who presents today for follow up of her elevated glucose, depression and hyperlipidemia     She quit her job about 5 months ago due to significant stress that it was causing her. She states that since she quit, she had felt significantly better. Her back pain , especially in her upper back, has improved with decreased stress. She was diagnosed with sleep apnea in may, 2017. She is using an oral device for this disorder. She has had the mouth piece now for one week. It caused some pain in her right lower molar. She has follow up on 7/6/17 for a fitting with the dentist that made her mouth piece. She has had also intermittent left calf cramping. Not related to any activity. No weakness. Resolves spontaneously. She consumes 4 cups of caffeinated beverage daily and about 3-4 glasses of water daily. Patient Active Problem List   Diagnosis Code    History of screening mammography Z92.89    [facility] C50.919    Pap smear for cervical cancer screening Z12.4    Colon cancer screening Z12.11    S/P cholecystectomy Z90.49    S/P tubal ligation Z98.51    Menopausal state N95.1    H/O bone density study Z92.89    Lipoma D17.9    Hyperlipemia E78.5    Depression F32.9    Obesity (BMI 30-39. 9) E66.9    Obstructive sleep apnea syndrome G47.33    Advance directive discussed with patient Z71.89     Current Outpatient Prescriptions   Medication Sig Dispense Refill    varicella zoster vacine live (ZOSTAVAX) 19,400 unit/0.65 mL susr injection 1 Vial by SubCUTAneous route once as needed for up to 1 dose. 0.65 mL 0    atorvastatin (LIPITOR) 10 mg tablet TAKE 1 TABLET BY MOUTH EVERY DAY 30 Tab 5    citalopram (CELEXA) 10 mg tablet TAKE 1 TABLET BY MOUTH EVERY DAY 30 Tab 5    amoxicillin (AMOXIL) 500 mg capsule TAKE 2 CAPSULES NOW THEN 1 CAPSULE 4 TIMES A DAY  0    CALCIUM CARBONATE/VITAMIN D3 (CALTRATE 600 + D PO) Take  by mouth daily.  CHOLECALCIFEROL, VITAMIN D3, (VITAMIN D3 PO) Take 1,000 Units by mouth daily.  cetirizine (ZYRTEC) 10 mg tablet Take  by mouth as needed.  ibuprofen (ADVIL) 200 mg tablet Take  by mouth. Review of Systems    Pertinent items are noted in HPI. Objective:     Visit Vitals    /80 (BP 1 Location: Left arm, BP Patient Position: Sitting)    Pulse 69    Temp 97.5 °F (36.4 °C) (Oral)    Resp 16    Ht 5' 7\" (1.702 m)    Wt 187 lb (84.8 kg)    SpO2 97%    BMI 29.29 kg/m2     General appearance: alert, cooperative, no distress, appears stated age  Head: Normocephalic, without obvious abnormality, atraumatic  Neck: supple, symmetrical, trachea midline, no adenopathy, no carotid bruit and no JVD  Lungs: clear to auscultation bilaterally  Heart: regular rate and rhythm, S1, S2 normal, no murmur, click, rub or gallop  Extremities: extremities normal, atraumatic, no cyanosis or edema  Pulses: 2+ and symmetric    Assessment/Plan:     1. Depression, unspecified depression type  -I evaluated and recommended to continue current doses of medications.   -mood improved with the relief of her work stress.    - CBC WITH AUTOMATED DIFF  - METABOLIC PANEL, COMPREHENSIVE    2. Mixed hyperlipidemia  -due for fasting lipid panel at this time     - CBC WITH AUTOMATED DIFF  - METABOLIC PANEL, COMPREHENSIVE  - LIPID PANEL    3. Elevated glucose  -continue to encouraged low sugar and carbohydrate diet. - METABOLIC PANEL, COMPREHENSIVE  - HEMOGLOBIN A1C WITH EAG  - TSH 3RD GENERATION    4. Cramps of left lower extremity  -encouraged regular stretches and hydration.  -reduce caffeine intake to about  8 ounces per day. 5. Encounter for long-term (current) use of medications    6. Health Care Maintenance    -need shingles vaccine. Script given to patient.      Orders Placed This Encounter    CBC WITH AUTOMATED DIFF    METABOLIC PANEL, COMPREHENSIVE    LIPID PANEL    HEMOGLOBIN A1C WITH EAG    TSH 3RD GENERATION    varicella zoster vacine live (ZOSTAVAX) 19,400 unit/0.65 mL susr injection     Si Vial by SubCUTAneous route once as needed for up to 1 dose. Dispense:  0.65 mL     Refill:  0         Follow-up Disposition:     Follow up in 6 months     Return if symptoms worsen or fail to improve. Advised patient to call back or return to office if symptoms worsen/change/persist.     Discussed expected course/resolution/complications of diagnosis in detail with patient. Medication risks/benefits/costs/interactions/alternatives discussed with patient. Patient was given an after visit summary which includes diagnoses, current medications, & vitals. Patient expressed understanding with the diagnosis and plan.

## 2017-06-28 NOTE — PROGRESS NOTES
Identified pt with two pt identifiers(name and ). Reviewed record in preparation for visit and have obtained necessary documentation. Chief Complaint   Patient presents with    Depression     Room #20    Blood sugar problem     check A1c with labs per last OV note    Leg Pain     intermittent cramping of left calf; pt denies swelling, erythema of LLE       Health Maintenance Due   Topic    Pneumococcal 19-64 Highest Risk (1 of 3 - PCV13)    DTaP/Tdap/Td series (1 - Tdap)    ZOSTER VACCINE AGE 60>     PAP AKA CERVICAL CYTOLOGY    Pt reports pap performed 3/2017. Coordination of Care Questionnaire:  :   1) Have you been to an emergency room, urgent care clinic since your last visit? no   Hospitalized since your last visit? no             2. Have seen or consulted any other health care provider since your last visit? YES  If yes, where when, and reason for visit?    -  3/20/17- present: Sleep center  -  3/2017: mammo and pap done per Dr. Dr. Ina Hayes (gyn)  - U/S done 3/15/17    3) Do you have an Advanced Directive/ Living Will in place? NO  If yes, do we have a copy on file? NO  If no, would you like information? Yes: forms given to pt as requested- pt aware she is to bring back to office once complete to have scanned into EMR    Patient is accompanied by self I have received verbal consent from Lew Cruz to discuss any/all medical information while they are present in the room.

## 2017-06-28 NOTE — MR AVS SNAPSHOT
Visit Information Date & Time Provider Department Dept. Phone Encounter #  
 6/28/2017 11:20 AM Zhanna Madden MD [place] [id number] [facility] [phone] [id number] Follow-up Instructions Return in about 6 months (around 12/28/2017). Your Appointments 9/25/2017  9:20 AM  
Any with López Pryor MD  
[facility] ([facility]) Appt Note: 4 month post dental ref fu  
 [address] [facility] [place]  [id number] [address] [facility] [address] Upcoming Health Maintenance Date Due DTaP/Tdap/Td series (1 - Tdap) 3/9/1978 ZOSTER VACCINE AGE 60> 9/2/2017* INFLUENZA AGE 9 TO ADULT 8/1/2017 BREAST CANCER SCRN MAMMOGRAM 5/1/2018 Pneumococcal 19-64 Highest Risk (2 of 3 - PCV13) 6/28/2018 COLONOSCOPY 2/27/2020 PAP AKA CERVICAL CYTOLOGY 5/1/2020 *Topic was postponed. The date shown is not the original due date. Allergies as of 6/28/2017  Review Complete On: 6/28/2017 By: Rosa Palmer No Known Allergies Current Immunizations  Reviewed on 12/9/2015 Name Date Influenza Vaccine 9/25/2014, 9/9/2013 Influenza Vaccine (Quad) PF 12/9/2015 Not reviewed this visit You Were Diagnosed With   
  
 Codes Comments Depression, unspecified depression type    -  Primary ICD-10-CM: F32.9 ICD-9-CM: 069 Mixed hyperlipidemia     ICD-10-CM: E78.2 ICD-9-CM: 272.2 Elevated glucose     ICD-10-CM: R73.09 
ICD-9-CM: 790.29 Encounter for long-term (current) use of medications     ICD-10-CM: Z79.899 ICD-9-CM: V58.69 Pap smear for cervical cancer screening     ICD-10-CM: Z12.4 ICD-9-CM: V76.2 Advance directive discussed with patient     ICD-10-CM: Z71.89 ICD-9-CM: V65.49 Vitals BP Pulse Temp Resp Height(growth percentile) Weight(growth percentile)  130/80 (BP 1 Location: Left arm, BP Patient Position: Sitting) 69 97.5 °F (36.4 °C) (Oral) 16 5' 7\" (1.702 m) 187 lb (84.8 kg) SpO2 BMI OB Status Smoking Status 97% 29.29 kg/m2 Postmenopausal Former Smoker Vitals History BMI and BSA Data Body Mass Index Body Surface Area  
 29.29 kg/m 2 2 m 2 Preferred Pharmacy Pharmacy Name Phone [id number], [address] [place] [address] [phone] Your Updated Medication List  
  
   
This list is accurate as of: 6/28/17 12:10 PM.  Always use your most recent med list. ADVIL 200 mg tablet Generic drug:  ibuprofen Take  by mouth. amoxicillin 500 mg capsule Commonly known as:  AMOXIL TAKE 2 CAPSULES NOW THEN 1 CAPSULE 4 TIMES A DAY  
  
 atorvastatin 10 mg tablet Commonly known as:  LIPITOR  
TAKE 1 TABLET BY MOUTH EVERY DAY  
  
 CALTRATE 600 + D PO Take  by mouth daily. citalopram 10 mg tablet Commonly known as:  CELEXA  
TAKE 1 TABLET BY MOUTH EVERY DAY  
  
 VITAMIN D3 PO Take 1,000 Units by mouth daily. ZyrTEC 10 mg tablet Generic drug:  cetirizine Take  by mouth as needed. We Performed the Following CBC WITH AUTOMATED DIFF [85689 CPT(R)] HEMOGLOBIN A1C WITH EAG [73222 CPT(R)] LIPID PANEL [64874 CPT(R)] METABOLIC PANEL, COMPREHENSIVE [18122 CPT(R)] TSH 3RD GENERATION [22842 CPT(R)] Follow-up Instructions Return in about 6 months (around 12/28/2017). Introducing [facility]! Shyanne Cisneros introduces [employer] patient portal. Now you can access parts of your medical record, email your doctor's office, and request medication refills online. 1. In your internet browser, go to [email or web address] 2. Click on the First Time User? Click Here link in the Sign In box. You will see the New Member Sign Up page. 3. Enter your [employer] Access Code exactly as it appears below.  You will not need to use this code after youve completed the sign-up process. If you do not sign up before the expiration date, you must request a new code. · [employer] Access Code: [email or web address] Expires: 8/19/2017  2:23 PM 
 
4. Enter the last four digits of your Social Security Number (xxxx) and Date of Birth (mm/dd/yyyy) as indicated and click Submit. You will be taken to the next sign-up page. 5. Create a [employer] ID. This will be your [employer] login ID and cannot be changed, so think of one that is secure and easy to remember. 6. Create a [employer] password. You can change your password at any time. 7. Enter your Password Reset Question and Answer. This can be used at a later time if you forget your password. 8. Enter your e-mail address. You will receive e-mail notification when new information is available in [address] 9. Click Sign Up. You can now view and download portions of your medical record. 10. Click the Download Summary menu link to download a portable copy of your medical information. If you have questions, please visit the Frequently Asked Questions section of the [employer] website. Remember, [employer] is NOT to be used for urgent needs. For medical emergencies, dial 911. Now available from your iPhone and Android! Please provide this summary of care documentation to your next provider. Your primary care clinician is listed as Romi Fabian. If you have any questions after today's visit, please call [phone].

## 2017-06-30 LAB
ALBUMIN SERPL-MCNC: 4 G/DL (ref 3.6–4.8)
ALBUMIN/GLOB SERPL: 1.7 {RATIO} (ref 1.2–2.2)
ALP SERPL-CCNC: 118 IU/L (ref 39–117)
ALT SERPL-CCNC: 48 IU/L (ref 0–32)
AST SERPL-CCNC: 29 IU/L (ref 0–40)
BASOPHILS # BLD AUTO: 0 X10E3/UL (ref 0–0.2)
BASOPHILS NFR BLD AUTO: 0 %
BILIRUB SERPL-MCNC: 0.4 MG/DL (ref 0–1.2)
BUN SERPL-MCNC: 10 MG/DL (ref 8–27)
BUN/CREAT SERPL: 15 (ref 12–28)
CALCIUM SERPL-MCNC: 9.2 MG/DL (ref 8.7–10.3)
CHLORIDE SERPL-SCNC: 104 MMOL/L (ref 96–106)
CHOLEST SERPL-MCNC: 136 MG/DL (ref 100–199)
CO2 SERPL-SCNC: 28 MMOL/L (ref 18–29)
CREAT SERPL-MCNC: 0.66 MG/DL (ref 0.57–1)
EOSINOPHIL # BLD AUTO: 0.1 X10E3/UL (ref 0–0.4)
EOSINOPHIL NFR BLD AUTO: 1 %
ERYTHROCYTE [DISTWIDTH] IN BLOOD BY AUTOMATED COUNT: 12.9 % (ref 12.3–15.4)
EST. AVERAGE GLUCOSE BLD GHB EST-MCNC: 105 MG/DL
GLOBULIN SER CALC-MCNC: 2.3 G/DL (ref 1.5–4.5)
GLUCOSE SERPL-MCNC: 96 MG/DL (ref 65–99)
HBA1C MFR BLD: 5.3 % (ref 4.8–5.6)
HCT VFR BLD AUTO: 47 % (ref 34–46.6)
HDLC SERPL-MCNC: 42 MG/DL
HGB BLD-MCNC: 14.7 G/DL (ref 11.1–15.9)
IMM GRANULOCYTES # BLD: 0 X10E3/UL (ref 0–0.1)
IMM GRANULOCYTES NFR BLD: 0 %
INTERPRETATION, 910389: NORMAL
LDLC SERPL CALC-MCNC: 69 MG/DL (ref 0–99)
LYMPHOCYTES # BLD AUTO: 1.7 X10E3/UL (ref 0.7–3.1)
LYMPHOCYTES NFR BLD AUTO: 36 %
MCH RBC QN AUTO: 30.4 PG (ref 26.6–33)
MCHC RBC AUTO-ENTMCNC: 31.3 G/DL (ref 31.5–35.7)
MCV RBC AUTO: 97 FL (ref 79–97)
MONOCYTES # BLD AUTO: 0.3 X10E3/UL (ref 0.1–0.9)
MONOCYTES NFR BLD AUTO: 6 %
NEUTROPHILS # BLD AUTO: 2.5 X10E3/UL (ref 1.4–7)
NEUTROPHILS NFR BLD AUTO: 57 %
PLATELET # BLD AUTO: 259 X10E3/UL (ref 150–379)
POTASSIUM SERPL-SCNC: 4.1 MMOL/L (ref 3.5–5.2)
PROT SERPL-MCNC: 6.3 G/DL (ref 6–8.5)
RBC # BLD AUTO: 4.83 X10E6/UL (ref 3.77–5.28)
SODIUM SERPL-SCNC: 144 MMOL/L (ref 134–144)
TRIGL SERPL-MCNC: 124 MG/DL (ref 0–149)
TSH SERPL DL<=0.005 MIU/L-ACNC: 3.03 UIU/ML (ref 0.45–4.5)
VLDLC SERPL CALC-MCNC: 25 MG/DL (ref 5–40)
WBC # BLD AUTO: 4.5 X10E3/UL (ref 3.4–10.8)

## 2017-09-21 RX ORDER — ATORVASTATIN CALCIUM 10 MG/1
TABLET, FILM COATED ORAL
Qty: 30 TAB | Refills: 5 | Status: SHIPPED | OUTPATIENT
Start: 2017-09-21 | End: 2018-03-08 | Stop reason: SDUPTHER

## 2017-09-21 RX ORDER — CITALOPRAM 10 MG/1
TABLET ORAL
Qty: 30 TAB | Refills: 5 | Status: SHIPPED | OUTPATIENT
Start: 2017-09-21 | End: 2018-03-08 | Stop reason: SDUPTHER

## 2017-11-28 ENCOUNTER — TELEPHONE (OUTPATIENT)
Dept: INTERNAL MEDICINE CLINIC | Age: 62
End: 2017-11-28

## 2017-11-28 NOTE — TELEPHONE ENCOUNTER
Received physician verification form from pt, today, requesting that it be completed and returned. Form specifically request signature of physician verifying the pt had an annual preventative care exam. After review of the pt's chart, it is noted that she has not had an annual physical exam since Dec. 09, 2015. Therefore, per provider, the form is unable to be completed. Spoke with pt and advised of this information. Fortunately, an available physical slot has opened for tomorrow and the pt has been scheduled. Form will be completed after appointment tomorrow.

## 2017-11-29 ENCOUNTER — OFFICE VISIT (OUTPATIENT)
Dept: INTERNAL MEDICINE CLINIC | Age: 62
End: 2017-11-29

## 2017-11-29 VITALS
OXYGEN SATURATION: 96 % | HEIGHT: 67 IN | WEIGHT: 188 LBS | TEMPERATURE: 98.2 F | DIASTOLIC BLOOD PRESSURE: 78 MMHG | BODY MASS INDEX: 29.51 KG/M2 | SYSTOLIC BLOOD PRESSURE: 116 MMHG | RESPIRATION RATE: 14 BRPM | HEART RATE: 70 BPM

## 2017-11-29 DIAGNOSIS — E78.2 MIXED HYPERLIPIDEMIA: ICD-10-CM

## 2017-11-29 DIAGNOSIS — R73.09 ELEVATED GLUCOSE: ICD-10-CM

## 2017-11-29 DIAGNOSIS — R74.8 ABNORMAL LIVER ENZYMES: ICD-10-CM

## 2017-11-29 DIAGNOSIS — Z00.00 ROUTINE GENERAL MEDICAL EXAMINATION AT A HEALTH CARE FACILITY: Primary | ICD-10-CM

## 2017-11-29 DIAGNOSIS — Z23 ENCOUNTER FOR IMMUNIZATION: ICD-10-CM

## 2017-11-29 RX ORDER — OXYCODONE AND ACETAMINOPHEN 7.5; 325 MG/1; MG/1
TABLET ORAL
Refills: 0 | COMMUNITY
Start: 2017-09-25 | End: 2017-11-29 | Stop reason: ALTCHOICE

## 2017-11-29 RX ORDER — AMOXICILLIN 500 MG/1
CAPSULE ORAL
Refills: 0 | COMMUNITY
Start: 2017-09-25 | End: 2017-11-29 | Stop reason: ALTCHOICE

## 2017-11-29 NOTE — PROGRESS NOTES
Subjective:      Jalen Stokes is a 63 y.o. female who presents today for her annual physical and for biometric forms to be completed. She is without any new concerns at this time. Gyn care is provided by Dr. Leonardo Vaca. - last visit was 5/2017 all stable  Screening mammogram was done at [facility] - date- 5/2017  Screening colonoscopy was last completed with Dr. Harry Reddy, 2/27/15. follow up 5 years  Bone Density testing was completed with Dr. Leonardo Vaca, last 2008    Exercise -  Walk her dogs    Patient Active Problem List   Diagnosis Code    History of screening mammography Z92.89    Breast cancer ([facility] [address] C50.919    Pap smear for cervical cancer screening Z12.4    Colon cancer screening Z12.11    S/P cholecystectomy Z90.49    S/P tubal ligation Z98.51    Menopausal state N95.1    H/O bone density study Z92.89    Lipoma D17.9    Hyperlipemia E78.5    Depression F32.9    Obesity (BMI 30-39. 9) E66.9    Obstructive sleep apnea syndrome G47.33    Advance directive discussed with patient Z71.89     Current Outpatient Prescriptions   Medication Sig Dispense Refill    OMEPRAZOLE (PRILOSEC PO) Take  by mouth daily as needed.  atorvastatin (LIPITOR) 10 mg tablet TAKE 1 TABLET BY MOUTH EVERY DAY 30 Tab 5    citalopram (CELEXA) 10 mg tablet TAKE 1 TABLET BY MOUTH EVERY DAY 30 Tab 5    ibuprofen (ADVIL) 200 mg tablet Take  by mouth.  CALCIUM CARBONATE/VITAMIN D3 (CALTRATE 600 + D PO) Take  by mouth daily.  CHOLECALCIFEROL, VITAMIN D3, (VITAMIN D3 PO) Take 1,000 Units by mouth daily.  cetirizine (ZYRTEC) 10 mg tablet Take  by mouth as needed. Review of Systems    A comprehensive review of systems was negative except for that written in the HPI.      Objective:     Visit Vitals    /78 (BP 1 Location: Right arm, BP Patient Position: Sitting)    Pulse 70    Temp 98.2 °F (36.8 °C) (Oral)    Resp 14    Ht 5' 7\" (1.702 m)    Wt 188 lb (85.3 kg)    SpO2 96%    BMI 29.44 kg/m2 General:  Alert, cooperative, no distress, appears stated age. Head:  Normocephalic, without obvious abnormality, atraumatic. Eyes:  Conjunctivae/corneas clear. PERRL, EOMs intact. Ears:  Normal TMs and external ear canals both ears. Nose: Nares normal. Septum midline. Mucosa normal. No drainage or sinus tenderness. Throat: Lips, mucosa, and tongue normal. Teeth and gums normal.   Neck: Supple, symmetrical, trachea midline, no adenopathy, thyroid: no enlargement/tenderness/nodules, no carotid bruit and no JVD. Back:   Symmetric, no curvature. ROM normal. No CVA tenderness. Lungs:   Clear to auscultation bilaterally. Chest wall:  No tenderness or deformity. Heart:  Regular rate and rhythm, S1, S2 normal, no murmur, click, rub or gallop. Breast Exam:  No tenderness, masses, or nipple abnormality. Abdomen:   Soft, non-tender. Bowel sounds normal. No masses,  No organomegaly. Extremities: Extremities normal, atraumatic, no cyanosis or edema. Pulses: 2+ and symmetric all extremities. Skin: Skin color, texture, turgor normal. No rashes or lesions. Lymph nodes: Cervical, supraclavicular, and axillary nodes normal.   Neurologic: CNII-XII intact. Normal strength, sensation and reflexes throughout. Assessment/Plan:     1. Routine general medical examination at a health care facility  -up to date with gyn care and screening mammogram  -up to date with screening colonoscopy  -recommended DXA at age 72yo. - AMB POC EKG ROUTINE W/ 12 LEADS, INTER & REP  - CBC WITH AUTOMATED DIFF  - METABOLIC PANEL, COMPREHENSIVE  - LIPID PANEL  - URINALYSIS W/ RFLX MICROSCOPIC    Also, she has additional complaints of the following -.     2. Abnormal liver enzymes  -trying to maintain a low fat diet.   -ultrasound of liver showed steatosis. - METABOLIC PANEL, COMPREHENSIVE    3. Elevated glucose  -encouraged a low sugar and carbohydrate diet as well. - HEMOGLOBIN A1C WITH EAG    4. Encounter for immunization  -received her tdap booster today without any complications. - Tetanus, diphtheria toxoids and acellular pertussis (TDAP) vaccine, in individuals >=7 years, IM  - DC IMMUNIZ ADMIN,1 SINGLE/COMB VAC/TOXOID    5. Mixed hyperlipidemia  -compliant with use of lipitor. Orders Placed This Encounter    Tetanus, diphtheria toxoids and acellular pertussis (TDAP) vaccine, in individuals >=7 years, IM    CBC WITH AUTOMATED DIFF    METABOLIC PANEL, COMPREHENSIVE    LIPID PANEL    URINALYSIS W/ RFLX MICROSCOPIC    HEMOGLOBIN A1C WITH EAG    AMB POC EKG ROUTINE W/ 12 LEADS, INTER & REP     Order Specific Question:   Reason for Exam:     Answer:   complete physical    DC IMMUNIZ ADMIN,1 SINGLE/COMB VAC/TOXOID     Follow-up Disposition:     Follow up in 6 months for her hyperlipidemia and depression and GARCES    Return if symptoms worsen or fail to improve. Advised patient to call back or return to office if symptoms worsen/change/persist.     Discussed expected course/resolution/complications of diagnosis in detail with patient. Medication risks/benefits/costs/interactions/alternatives discussed with patient. Patient was given an after visit summary which includes diagnoses, current medications, & vitals. Patient expressed understanding with the diagnosis and plan.

## 2017-11-29 NOTE — PATIENT INSTRUCTIONS
Vaccine Information Statement     Tdap (Tetanus, Diphtheria, Pertussis) Vaccine: What You Need to Know    Many Vaccine Information Statements are available in Mongolian and other languages. See www.immunize.org/vis. Hojas de Información Sobre Vacunas están disponibles en español y en muchos otros idiomas. Visite Karolyn[employer].si    1. Why get vaccinated? Tetanus, diphtheria, and pertussis are very serious diseases. Tdap vaccine can protect us from these diseases. And, Tdap vaccine given to pregnant women can protect  babies against pertussis. TETANUS (Lockjaw) is rare in the [facility] today. It causes painful muscle tightening and stiffness, usually all over the body.  It can lead to tightening of muscles in the head and neck so you cant open your mouth, swallow, or sometimes even breathe. Tetanus kills about 1 out of 10 people who are infected even after receiving the best medical care. DIPHTHERIA is also rare in the [facility] today. It can cause a thick coating to form in the back of the throat.  It can lead to breathing problems, heart failure, paralysis, and death. PERTUSSIS (Whooping Cough) causes severe coughing spells, which can cause difficulty breathing, vomiting, and disturbed sleep.  It can also lead to weight loss, incontinence, and rib fractures. Up to 2 in 100 adolescents and 5 in 100 adults with pertussis are hospitalized or have complications, which could include pneumonia or death. These diseases are caused by bacteria. Diphtheria and pertussis are spread from person to person through secretions from coughing or sneezing. Tetanus enters the body through cuts, scratches, or wounds. Before vaccines, as many as 200,000 cases of diphtheria, 200,000 cases of pertussis, and hundreds of cases of tetanus, were reported in the [place] each year.  Since vaccination began, reports of cases for tetanus and diphtheria have dropped by about 99% and for pertussis by about 80%. 2. Tdap vaccine    Tdap vaccine can protect adolescents and adults from tetanus, diphtheria, and pertussis. One dose of Tdap is routinely given at age 6 or 15. People who did not get Tdap at that age should get it as soon as possible. Tdap is especially important for health care professionals and anyone having close contact with a baby younger than 12 months. Pregnant women should get a dose of Tdap during every pregnancy, to protect the  from pertussis. Infants are most at risk for severe, life-threatening complications from pertussis. Another vaccine, called Td, protects against tetanus and diphtheria, but not pertussis. A Td booster should be given every 10 years. Tdap may be given as one of these boosters if you have never gotten Tdap before. Tdap may also be given after a severe cut or burn to prevent tetanus infection. Your doctor or the person giving you the vaccine can give you more information. Tdap may safely be given at the same time as other vaccines. 3. Some people should not get this vaccine     A person who has ever had a life-threatening allergic reaction after a previous dose of any diphtheria, tetanus or pertussis containing vaccine, OR has a severe allergy to any part of this vaccine, should not get Tdap vaccine. Tell the person giving the vaccine about any severe allergies.  Anyone who had coma or long repeated seizures within 7 days after a childhood dose of DTP or DTaP, or a previous dose of Tdap, should not get Tdap, unless a cause other than the vaccine was found. They can still get Td.  Talk to your doctor if you:  - have seizures or another nervous system problem,  - had severe pain or swelling after any vaccine containing diphtheria, tetanus or pertussis,   - ever had a condition called Guillain Barré Syndrome (GBS),  - arent feeling well on the day the shot is scheduled.     4. Risks    With any medicine, including vaccines, there is a chance of side effects. These are usually mild and go away on their own. Serious reactions are also possible but are rare. Most people who get Tdap vaccine do not have any problems with it. Mild Problems following Tdap  (Did not interfere with activities)   Pain where the shot was given (about 3 in 4 adolescents or 2 in 3 adults)   Redness or swelling where the shot was given (about 1 person in 5)   Mild fever of at least 100.4°F (up to about 1 in 25 adolescents or 1 in 100 adults)   Headache (about 3 or 4 people in 10)   Tiredness (about 1 person in 3 or 4)   Nausea, vomiting, diarrhea, stomach ache (up to 1 in 4 adolescents or 1 in 10 adults)   Chills,  sore joints (about 1 person in 10)   Body aches (about 1 person in 3 or 4)    Rash, swollen glands (uncommon)    Moderate Problems following Tdap  (Interfered with activities, but did not require medical attention)   Pain where the shot was given (up to 1 in 5 or 6)    Redness or swelling where the shot was given (up to about 1 in 16 adolescents or 1 in 12 adults)   Fever over 102°F (about 1 in 100 adolescents or 1 in 250 adults)   Headache (about 1 in 7 adolescents or 1 in 10 adults)   Nausea, vomiting, diarrhea, stomach ache (up to 1 or 3 people in 100)   Swelling of the entire arm where the shot was given (up to about 1 in 500). Severe Problems following Tdap  (Unable to perform usual activities; required medical attention)   Swelling, severe pain, bleeding, and redness in the arm where the shot was given (rare). Problems that could happen after any vaccine:     People sometimes faint after a medical procedure, including vaccination. Sitting or lying down for about 15 minutes can help prevent fainting, and injuries caused by a fall. Tell your doctor if you feel dizzy, or have vision changes or ringing in the ears.      Some people get severe pain in the shoulder and have difficulty moving the arm where a shot was given. This happens very rarely.  Any medication can cause a severe allergic reaction. Such reactions from a vaccine are very rare, estimated at fewer than 1 in a million doses, and would happen within a few minutes to a few hours after the vaccination. As with any medicine, there is a very remote chance of a vaccine causing a serious injury or death. The safety of vaccines is always being monitored. For more information, visit: www.cdc.gov/vaccinesafety/    5. What if there is a serious problem? What should I look for?  Look for anything that concerns you, such as signs of a severe allergic reaction, very high fever, or unusual behavior.  Signs of a severe allergic reaction can include hives, swelling of the face and throat, difficulty breathing, a fast heartbeat, dizziness, and weakness. These would usually start a few minutes to a few hours after the vaccination. What should I do?  If you think it is a severe allergic reaction or other emergency that cant wait, call 9-1-1 or get the person to the nearest hospital. Otherwise, call your doctor.  Afterward, the reaction should be reported to the Vaccine Adverse Event Reporting System (VAERS). Your doctor might file this report, or you can do it yourself through the VAERS web site at www.vaers. [facility].gov, or by calling 9-603.606.2009. VAERS does not give medical advice. 6. The National Vaccine Injury Compensation Program    The [facility] Vaccine Injury Compensation Program (VICP) is a federal program that was created to compensate people who may have been injured by certain vaccines. Persons who believe they may have been injured by a vaccine can learn about the program and about filing a claim by calling 4-333.740.7016 or visiting the [employer] website at www.[facility].gov/vaccinecompensation. There is a time limit to file a claim for compensation. 7. How can I learn more?  Ask your doctor.  He or she can give you the vaccine package insert or suggest other sources of information.  Call your local or state health department.  Contact the Centers for Disease Control and Prevention (CDC):  - Call 0-590.777.2037 (1-800-CDC-INFO) or  - Visit CDCs website at www.cdc.gov/vaccines      Vaccine Information Statement   Tdap Vaccine  (2/24/2015)  [address] [id number]    Department of Health and Human Services  Centers for Disease Control and Prevention    Office Use Only

## 2017-11-29 NOTE — PROGRESS NOTES
Patient's identity verified with two patient identifiers (name and date of birth). 1. Have you been to the ER, urgent care clinic since your last visit? Hospitalized since your last visit? No  2. Have you seen or consulted any other health care providers outside of the [address] [place] [address] since your last visit? Include any pap smears or colon screening. No    Chief Complaint   Patient presents with    Complete Physical     Fasting. Forms for insurance. Fasting. Health Maintenance Due   Topic Date Due    DTaP/Tdap/Td series (1 - Tdap)  Unsure of status. 03/09/1978    Influenza Age 5 to Adult   Done per patient, 10/13/17. 08/01/2017     Reviewed record in preparation for visit and have obtained necessary documentation.     Wt Readings from Last 3 Encounters:   11/29/17 188 lb (85.3 kg)   06/28/17 187 lb (84.8 kg)   04/28/17 190 lb (86.2 kg)     Temp Readings from Last 3 Encounters:   11/29/17 98.2 °F (36.8 °C) (Oral)   06/28/17 97.5 °F (36.4 °C) (Oral)   04/28/17 98.3 °F (36.8 °C)     BP Readings from Last 3 Encounters:   11/29/17 116/78   06/28/17 130/80   04/28/17 123/76     Pulse Readings from Last 3 Encounters:   11/29/17 70   06/28/17 69   04/28/17 74       Depression Screening:  :     PHQ over the last two weeks 11/29/2017   Little interest or pleasure in doing things Not at all   Feeling down, depressed or hopeless Not at all   Total Score PHQ 2 0   Trouble falling or staying asleep, or sleeping too much Not at all   Feeling tired or having little energy Not at all   Poor appetite or overeating Not at all   Feeling bad about yourself - or that you are a failure or have let yourself or your family down Not at all   Trouble concentrating on things such as school, work, reading or watching TV Not at all   Moving or speaking so slowly that other people could have noticed; or the opposite being so fidgety that others notice Not at all   Thoughts of being better off dead, or hurting yourself in some way Not at all   PHQ 9 Score 0     Abuse Screening:  :     Abuse Screening Questionnaire 11/29/2017 12/9/2015 2/28/2014   Do you ever feel afraid of your partner? N N N   Are you in a relationship with someone who physically or mentally threatens you? N N N   Is it safe for you to go home?  Marylen Bath

## 2017-11-29 NOTE — MR AVS SNAPSHOT
Visit Information Date & Time Provider Department Dept. Phone Encounter #  
 11/29/2017 10:00 AM MD [facility] [phone] [id number] Follow-up Instructions Return in about 6 months (around 5/29/2018) for hyperlipidemia, depression. Your Appointments 1/5/2018  9:00 AM  
ROUTINE CARE with MD [facility] ([address]) Appt Note: 6m fu  
 [address], [address] [place] [address]  
[address], [address] [phone] Upcoming Health Maintenance Date Due  
 BREAST CANCER SCRN MAMMOGRAM 5/1/2018 Pneumococcal 19-64 Highest Risk (2 of 3 - PCV13) 6/28/2018 COLONOSCOPY 2/27/2020 PAP AKA CERVICAL CYTOLOGY 5/1/2020 DTaP/Tdap/Td series (2 - Td) 11/29/2027 Allergies as of 11/29/2017  Review Complete On: 11/29/2017 By: Chloé Flores No Known Allergies Current Immunizations  Reviewed on 11/29/2017 Name Date Influenza Vaccine 10/13/2017, 9/25/2014, 9/9/2013 Influenza Vaccine (Quad) PF 12/9/2015 Tdap  Incomplete Reviewed by Reji Shay MD on 11/29/2017 at 10:28 AM  
 Reviewed by Reji Shay MD on 11/29/2017 at 10:31 AM  
You Were Diagnosed With   
  
 Codes Comments Routine general medical examination at a health care facility    -  Primary ICD-10-CM: Z00.00 ICD-9-CM: V70.0 Abnormal liver enzymes     ICD-10-CM: R74.8 ICD-9-CM: 790.5 Elevated glucose     ICD-10-CM: R73.09 
ICD-9-CM: 790.29 Encounter for immunization     ICD-10-CM: T17 ICD-9-CM: V03.89 Mixed hyperlipidemia     ICD-10-CM: E78.2 ICD-9-CM: 272.2 Vitals BP Pulse Temp Resp Height(growth percentile) Weight(growth percentile) 116/78 (BP 1 Location: Right arm, BP Patient Position: Sitting) 70 98.2 °F (36.8 °C) (Oral) 14 5' 7\" (1.702 m) 188 lb (85.3 kg) SpO2 BMI OB Status Smoking Status 96% 29.44 kg/m2 Postmenopausal Former Smoker Vitals History BMI and BSA Data Body Mass Index Body Surface Area  
 29.44 kg/m 2 2.01 m 2 Preferred Pharmacy Pharmacy Name Phone [facility][id number] Medrano, [address] [address] [phone] Your Updated Medication List  
  
   
This list is accurate as of: 11/29/17 10:45 AM.  Always use your most recent med list. ADVIL 200 mg tablet Generic drug:  ibuprofen Take  by mouth. atorvastatin 10 mg tablet Commonly known as:  LIPITOR  
TAKE 1 TABLET BY MOUTH EVERY DAY  
  
 CALTRATE 600 + D PO Take  by mouth daily. citalopram 10 mg tablet Commonly known as:  CELEXA  
TAKE 1 TABLET BY MOUTH EVERY DAY PRILOSEC PO Take  by mouth daily as needed. VITAMIN D3 PO Take 1,000 Units by mouth daily. ZyrTEC 10 mg tablet Generic drug:  cetirizine Take  by mouth as needed. We Performed the Following AMB POC EKG ROUTINE W/ 12 LEADS, INTER & REP [66526 CPT(R)] CBC WITH AUTOMATED DIFF [02409 CPT(R)] HEMOGLOBIN A1C WITH EAG [93891 CPT(R)] LIPID PANEL [59480 CPT(R)] METABOLIC PANEL, COMPREHENSIVE [93505 CPT(R)] GA IMMUNIZ ADMIN,1 SINGLE/COMB VAC/TOXOID [id number] CPT(R)] TETANUS, DIPHTHERIA TOXOIDS AND ACELLULAR PERTUSSIS VACCINE (TDAP), IN INDIVIDS. >=7, IM [id number] CPT(R)] URINALYSIS W/ RFLX MICROSCOPIC [92102 CPT(R)] Follow-up Instructions Return in about 6 months (around 5/29/2018) for hyperlipidemia, depression. Patient Instructions Vaccine Information Statement Tdap (Tetanus, Diphtheria, Pertussis) Vaccine: What You Need to Know Many Vaccine Information Statements are available in Upper sorbian and other languages. See www.immunize.org/vis. Hojas de Información Sobre Vacunas están disponibles en español y en muchos otros idiomas. Visite [email or web address].si 1. Why get vaccinated? Tetanus, diphtheria, and pertussis are very serious diseases. Tdap vaccine can protect us from these diseases. And, Tdap vaccine given to pregnant women can protect  babies against pertussis. TETANUS (Lockjaw) is rare in the [facility] today. It causes painful muscle tightening and stiffness, usually all over the body. ? It can lead to tightening of muscles in the head and neck so you cant open your mouth, swallow, or sometimes even breathe. Tetanus kills about 1 out of 10 people who are infected even after receiving the best medical care. DIPHTHERIA is also rare in the [facility] today. It can cause a thick coating to form in the back of the throat. ? It can lead to breathing problems, heart failure, paralysis, and death. PERTUSSIS (Whooping Cough) causes severe coughing spells, which can cause difficulty breathing, vomiting, and disturbed sleep. ? It can also lead to weight loss, incontinence, and rib fractures. Up to 2 in 100 adolescents and 5 in 100 adults with pertussis are hospitalized or have complications, which could include pneumonia or death. These diseases are caused by bacteria. Diphtheria and pertussis are spread from person to person through secretions from coughing or sneezing. Tetanus enters the body through cuts, scratches, or wounds. Before vaccines, as many as 200,000 cases of diphtheria, 200,000 cases of pertussis, and hundreds of cases of tetanus, were reported in the [place] each year. Since vaccination began, reports of cases for tetanus and diphtheria have dropped by about 99% and for pertussis by about 80%. 2. Tdap vaccine Tdap vaccine can protect adolescents and adults from tetanus, diphtheria, and pertussis. One dose of Tdap is routinely given at age 6 or 15. People who did not get Tdap at that age should get it as soon as possible.  
 
Tdap is especially important for health care professionals and anyone having close contact with a baby younger than 12 months. Pregnant women should get a dose of Tdap during every pregnancy, to protect the  from pertussis. Infants are most at risk for severe, life-threatening complications from pertussis. Another vaccine, called Td, protects against tetanus and diphtheria, but not pertussis. A Td booster should be given every 10 years. Tdap may be given as one of these boosters if you have never gotten Tdap before. Tdap may also be given after a severe cut or burn to prevent tetanus infection. Your doctor or the person giving you the vaccine can give you more information. Tdap may safely be given at the same time as other vaccines. 3. Some people should not get this vaccine  A person who has ever had a life-threatening allergic reaction after a previous dose of any diphtheria, tetanus or pertussis containing vaccine, OR has a severe allergy to any part of this vaccine, should not get Tdap vaccine. Tell the person giving the vaccine about any severe allergies.  Anyone who had coma or long repeated seizures within 7 days after a childhood dose of DTP or DTaP, or a previous dose of Tdap, should not get Tdap, unless a cause other than the vaccine was found. They can still get Td.  Talk to your doctor if you: 
- have seizures or another nervous system problem, 
- had severe pain or swelling after any vaccine containing diphtheria, tetanus or pertussis,  
- ever had a condition called Guillain Barré Syndrome (GBS), 
- arent feeling well on the day the shot is scheduled. 4. Risks With any medicine, including vaccines, there is a chance of side effects. These are usually mild and go away on their own. Serious reactions are also possible but are rare. Most people who get Tdap vaccine do not have any problems with it. Mild Problems following Tdap 
(Did not interfere with activities)  Pain where the shot was given (about 3 in 4 adolescents or 2 in 3 adults)  Redness or swelling where the shot was given (about 1 person in 5)  Mild fever of at least 100.4°F (up to about 1 in 25 adolescents or 1 in 100 adults)  Headache (about 3 or 4 people in 10)  Tiredness (about 1 person in 3 or 4)  Nausea, vomiting, diarrhea, stomach ache (up to 1 in 4 adolescents or 1 in 10 adults)  Chills,  sore joints (about 1 person in 10)  Body aches (about 1 person in 3 or 4)  Rash, swollen glands (uncommon) Moderate Problems following Tdap (Interfered with activities, but did not require medical attention)  Pain where the shot was given (up to 1 in 5 or 6)  Redness or swelling where the shot was given (up to about 1 in 16 adolescents or 1 in 12 adults)  Fever over 102°F (about 1 in 100 adolescents or 1 in 250 adults)  Headache (about 1 in 7 adolescents or 1 in 10 adults)  Nausea, vomiting, diarrhea, stomach ache (up to 1 or 3 people in 100)  Swelling of the entire arm where the shot was given (up to about 1 in 500). Severe Problems following Tdap 
(Unable to perform usual activities; required medical attention)  Swelling, severe pain, bleeding, and redness in the arm where the shot was given (rare). Problems that could happen after any vaccine:  People sometimes faint after a medical procedure, including vaccination. Sitting or lying down for about 15 minutes can help prevent fainting, and injuries caused by a fall. Tell your doctor if you feel dizzy, or have vision changes or ringing in the ears.  Some people get severe pain in the shoulder and have difficulty moving the arm where a shot was given. This happens very rarely.  Any medication can cause a severe allergic reaction. Such reactions from a vaccine are very rare, estimated at fewer than 1 in a million doses, and would happen within a few minutes to a few hours after the vaccination. As with any medicine, there is a very remote chance of a vaccine causing a serious injury or death. The safety of vaccines is always being monitored. For more information, visit: www.cdc.gov/vaccinesafety/ 
 
 
The [facility] Gene Vaccine Injury Compensation Program (VICP) is a federal program that was created to compensate people who may have been injured by certain vaccines. Persons who believe they may have been injured by a vaccine can learn about the program and about filing a claim by calling 3-246.338.9001 or visiting the [employer] website at www.[facility].gov/vaccinecompensation. There is a time limit to file a claim for compensation. 7. How can I learn more?  Ask your doctor. He or she can give you the vaccine package insert or suggest other sources of information.  Call your local or state health department.  Contact the Centers for Disease Control and Prevention (CDC): 
- Call 9-735.267.5187 (1-800-CDC-INFO) or 
- Visit CDCs website at www.cdc.gov/vaccines Vaccine Information Statement Tdap Vaccine 
(2/24/2015) 42 UKaelyn Bales 249OW-13 Department of [facility] and [employer] Centers for Disease Control and Prevention Office Use Only Introducing [facility]! Savanna Anthony introduces [employer] patient portal. Now you can access parts of your medical record, email your doctor's office, and request medication refills online. 1. In your internet browser, go to https://[email or web address]. [employer]yard/[employer] 2. Click on the First Time User? Click Here link in the Sign In box. You will see the New Member Sign Up page. 3. Enter your [employer] Access Code exactly as it appears below. You will not need to use this code after youve completed the sign-up process. If you do not sign up before the expiration date, you must request a new code. · [employer] Access Code: [email or web address] Expires: 2/27/2018 10:45 AM 
 
4. Enter the last four digits of your Social Security Number (xxxx) and Date of Birth (mm/dd/yyyy) as indicated and click Submit. You will be taken to the next sign-up page. 5. Create a [employer] ID. This will be your [employer] login ID and cannot be changed, so think of one that is secure and easy to remember. 6. Create a [employer] password. You can change your password at any time. 7. Enter your Password Reset Question and Answer. This can be used at a later time if you forget your password. 8. Enter your e-mail address. You will receive e-mail notification when new information is available in [address] 9. Click Sign Up. You can now view and download portions of your medical record. 10. Click the Download Summary menu link to download a portable copy of your medical information. If you have questions, please visit the Frequently Asked Questions section of the [employer] website. Remember, [employer] is NOT to be used for urgent needs. For medical emergencies, dial 911. Now available from your iPhone and Android! Please provide this summary of care documentation to your next provider. Your primary care clinician is listed as Romi Fabian. If you have any questions after today's visit, please call [phone].

## 2017-12-01 LAB
ALBUMIN SERPL-MCNC: 4.3 G/DL (ref 3.6–4.8)
ALBUMIN/GLOB SERPL: 1.9 {RATIO} (ref 1.2–2.2)
ALP SERPL-CCNC: 140 IU/L (ref 39–117)
ALT SERPL-CCNC: 54 IU/L (ref 0–32)
APPEARANCE UR: CLEAR
AST SERPL-CCNC: 31 IU/L (ref 0–40)
BASOPHILS # BLD AUTO: 0 X10E3/UL (ref 0–0.2)
BASOPHILS NFR BLD AUTO: 1 %
BILIRUB SERPL-MCNC: 0.4 MG/DL (ref 0–1.2)
BILIRUB UR QL STRIP: NEGATIVE
BUN SERPL-MCNC: 12 MG/DL (ref 8–27)
BUN/CREAT SERPL: 18 (ref 12–28)
CALCIUM SERPL-MCNC: 9 MG/DL (ref 8.7–10.3)
CHLORIDE SERPL-SCNC: 104 MMOL/L (ref 96–106)
CHOLEST SERPL-MCNC: 141 MG/DL (ref 100–199)
CO2 SERPL-SCNC: 26 MMOL/L (ref 18–29)
COLOR UR: YELLOW
CREAT SERPL-MCNC: 0.68 MG/DL (ref 0.57–1)
EOSINOPHIL # BLD AUTO: 0.1 X10E3/UL (ref 0–0.4)
EOSINOPHIL NFR BLD AUTO: 1 %
ERYTHROCYTE [DISTWIDTH] IN BLOOD BY AUTOMATED COUNT: 13.6 % (ref 12.3–15.4)
EST. AVERAGE GLUCOSE BLD GHB EST-MCNC: 114 MG/DL
GFR SERPLBLD CREATININE-BSD FMLA CKD-EPI: 110 ML/MIN/1.73
GFR SERPLBLD CREATININE-BSD FMLA CKD-EPI: 95 ML/MIN/1.73
GLOBULIN SER CALC-MCNC: 2.3 G/DL (ref 1.5–4.5)
GLUCOSE SERPL-MCNC: 101 MG/DL (ref 65–99)
GLUCOSE UR QL: NEGATIVE
HBA1C MFR BLD: 5.6 % (ref 4.8–5.6)
HCT VFR BLD AUTO: 44.6 % (ref 34–46.6)
HDLC SERPL-MCNC: 44 MG/DL
HGB BLD-MCNC: 14.5 G/DL (ref 11.1–15.9)
HGB UR QL STRIP: NEGATIVE
IMM GRANULOCYTES # BLD: 0 X10E3/UL (ref 0–0.1)
IMM GRANULOCYTES NFR BLD: 0 %
INTERPRETATION, 910389: NORMAL
KETONES UR QL STRIP: NEGATIVE
LDLC SERPL CALC-MCNC: 82 MG/DL (ref 0–99)
LEUKOCYTE ESTERASE UR QL STRIP: NEGATIVE
LYMPHOCYTES # BLD AUTO: 2 X10E3/UL (ref 0.7–3.1)
LYMPHOCYTES NFR BLD AUTO: 36 %
MCH RBC QN AUTO: 29.8 PG (ref 26.6–33)
MCHC RBC AUTO-ENTMCNC: 32.5 G/DL (ref 31.5–35.7)
MCV RBC AUTO: 92 FL (ref 79–97)
MICRO URNS: NORMAL
MONOCYTES # BLD AUTO: 0.3 X10E3/UL (ref 0.1–0.9)
MONOCYTES NFR BLD AUTO: 6 %
NEUTROPHILS # BLD AUTO: 3.2 X10E3/UL (ref 1.4–7)
NEUTROPHILS NFR BLD AUTO: 56 %
NITRITE UR QL STRIP: NEGATIVE
PH UR STRIP: 6.5 [PH] (ref 5–7.5)
PLATELET # BLD AUTO: 273 X10E3/UL (ref 150–379)
POTASSIUM SERPL-SCNC: 4.1 MMOL/L (ref 3.5–5.2)
PROT SERPL-MCNC: 6.6 G/DL (ref 6–8.5)
PROT UR QL STRIP: NEGATIVE
RBC # BLD AUTO: 4.86 X10E6/UL (ref 3.77–5.28)
SODIUM SERPL-SCNC: 145 MMOL/L (ref 134–144)
SP GR UR: 1.03 (ref 1–1.03)
TRIGL SERPL-MCNC: 76 MG/DL (ref 0–149)
UROBILINOGEN UR STRIP-MCNC: 0.2 MG/DL (ref 0.2–1)
VLDLC SERPL CALC-MCNC: 15 MG/DL (ref 5–40)
WBC # BLD AUTO: 5.5 X10E3/UL (ref 3.4–10.8)

## 2018-03-08 RX ORDER — ATORVASTATIN CALCIUM 10 MG/1
TABLET, FILM COATED ORAL
Qty: 30 TAB | Refills: 5 | Status: SHIPPED | OUTPATIENT
Start: 2018-03-08 | End: 2018-10-21 | Stop reason: SDUPTHER

## 2018-03-08 RX ORDER — CITALOPRAM 10 MG/1
TABLET ORAL
Qty: 30 TAB | Refills: 5 | Status: SHIPPED | OUTPATIENT
Start: 2018-03-08 | End: 2018-10-21 | Stop reason: SDUPTHER

## 2018-05-31 ENCOUNTER — OFFICE VISIT (OUTPATIENT)
Dept: INTERNAL MEDICINE CLINIC | Age: 63
End: 2018-05-31

## 2018-05-31 VITALS
DIASTOLIC BLOOD PRESSURE: 82 MMHG | HEART RATE: 67 BPM | TEMPERATURE: 97.9 F | SYSTOLIC BLOOD PRESSURE: 124 MMHG | WEIGHT: 182 LBS | OXYGEN SATURATION: 96 % | BODY MASS INDEX: 28.56 KG/M2 | HEIGHT: 67 IN | RESPIRATION RATE: 14 BRPM

## 2018-05-31 DIAGNOSIS — F32.A DEPRESSION, UNSPECIFIED DEPRESSION TYPE: Primary | ICD-10-CM

## 2018-05-31 DIAGNOSIS — K76.0 FATTY LIVER: ICD-10-CM

## 2018-05-31 DIAGNOSIS — E78.2 MIXED HYPERLIPIDEMIA: ICD-10-CM

## 2018-05-31 DIAGNOSIS — Z79.899 ENCOUNTER FOR LONG-TERM (CURRENT) USE OF MEDICATIONS: ICD-10-CM

## 2018-05-31 RX ORDER — METRONIDAZOLE 7.5 MG/G
CREAM TOPICAL
Refills: 3 | COMMUNITY
Start: 2018-04-03 | End: 2019-10-28 | Stop reason: ALTCHOICE

## 2018-05-31 RX ORDER — DOXYCYCLINE HYCLATE 50 MG/1
CAPSULE ORAL
Refills: 5 | COMMUNITY
Start: 2018-04-03 | End: 2018-10-23 | Stop reason: ALTCHOICE

## 2018-05-31 NOTE — MR AVS SNAPSHOT
[address] [facility], [address] [place] [address] 
[phone] Patient: Darian Winston MRN: [id number] ESJ:[birth year] Visit Information Date & Time Provider Department Dept. Phone Encounter #  
 5/31/2018  8:20 AM Francesca Rosen MD [place] [address] [facility] [phone] [id number] Follow-up Instructions Return in about 6 months (around 11/30/2018) for hyperlipidemia, depression. Upcoming Health Maintenance Date Due Pneumococcal 19-64 Highest Risk (2 of 3 - PCV13) 6/28/2018 Influenza Age 5 to Adult 8/1/2018 BREAST CANCER SCRN MAMMOGRAM 3/23/2019 COLONOSCOPY 2/27/2020 PAP AKA CERVICAL CYTOLOGY 5/1/2020 DTaP/Tdap/Td series (2 - Td) 11/29/2027 Allergies as of 5/31/2018  Review Complete On: 5/31/2018 By: Francesca Rosen MD  
 No Known Allergies Current Immunizations  Reviewed on 11/29/2017 Name Date Influenza Vaccine 10/13/2017, 9/25/2014, 9/9/2013 Influenza Vaccine (Quad) PF 12/9/2015 Tdap 11/29/2017 Not reviewed this visit You Were Diagnosed With   
  
 Codes Comments Depression, unspecified depression type    -  Primary ICD-10-CM: F32.9 ICD-9-CM: 764 Mixed hyperlipidemia     ICD-10-CM: E78.2 ICD-9-CM: 272.2 Encounter for long-term (current) use of medications     ICD-10-CM: Z79.899 ICD-9-CM: V58.69 Vitals BP Pulse Temp Resp Height(growth percentile) Weight(growth percentile) 124/82 (BP 1 Location: Left arm, BP Patient Position: Sitting) 67 97.9 °F (36.6 °C) (Oral) 14 5' 7\" (1.702 m) 182 lb (82.6 kg) SpO2 BMI OB Status Smoking Status 96% 28.51 kg/m2 Postmenopausal Former Smoker Vitals History BMI and BSA Data Body Mass Index Body Surface Area 28.51 kg/m 2 1.98 m 2 Preferred Pharmacy Pharmacy Name Phone [facility] #[id number] [address] [place] [address] [phone] Your Updated Medication List  
  
   
This list is accurate as of 5/31/18  8:52 AM.  Always use your most recent med list. ADVIL 200 mg tablet Generic drug:  ibuprofen Take  by mouth every eight (8) hours as needed. atorvastatin 10 mg tablet Commonly known as:  LIPITOR  
TAKE 1 TABLET BY MOUTH EVERY DAY  
  
 CALTRATE 600 + D PO Take  by mouth daily. citalopram 10 mg tablet Commonly known as:  CELEXA  
TAKE 1 TABLET BY MOUTH EVERY DAY  
  
 doxycycline 50 mg capsule Commonly known as:  VIBRAMYCIN  
TAKE ONE CAPSULE BY MOUTH TWICE A DAY  
  
 metroNIDAZOLE 0.75 % topical cream  
Commonly known as:  METROCREAM  
APPLY CREAM TO FACE TWO TIMES DAILY PRILOSEC PO Take  by mouth daily as needed. VITAMIN D3 PO Take 1,000 Units by mouth daily. ZyrTEC 10 mg tablet Generic drug:  cetirizine Take  by mouth as needed. We Performed the Following LIPID PANEL [51109 CPT(R)] METABOLIC PANEL, COMPREHENSIVE [29693 CPT(R)] Follow-up Instructions Return in about 6 months (around 11/30/2018) for hyperlipidemia, depression. Introducing [facility]! [address] introduces [employer] patient portal. Now you can access parts of your medical record, email your doctor's office, and request medication refills online. 1. In your internet browser, go to [email or web address]://[email or web address] Jump or Fall/[employer]) 2. Click on the First Time User? Click Here link in the Sign In box. You will see the New Member Sign Up page. 3. Enter your [employer] Access Code exactly as it appears below. You will not need to use this code after youve completed the sign-up process. If you do not sign up before the expiration date, you must request a new code. · [employer] Access Code: [email or web address] Expires: 8/29/2018  8:52 AM 
 
4. Enter the last four digits of your Social Security Number (xxxx) and Date of Birth (mm/dd/yyyy) as indicated and click Submit.  You will be taken to the next sign-up page. 5. Create a [employer] ID. This will be your [employer] login ID and cannot be changed, so think of one that is secure and easy to remember. 6. Create a [employer] password. You can change your password at any time. 7. Enter your Password Reset Question and Answer. This can be used at a later time if you forget your password. 8. Enter your e-mail address. You will receive e-mail notification when new information is available in [address] 9. Click Sign Up. You can now view and download portions of your medical record. 10. Click the Download Summary menu link to download a portable copy of your medical information. If you have questions, please visit the Frequently Asked Questions section of the [employer] website. Remember, [employer] is NOT to be used for urgent needs. For medical emergencies, dial 911. Now available from your iPhone and Android! Please provide this summary of care documentation to your next provider. Your primary care clinician is listed as Romi Fabian. If you have any questions after today's visit, please call [phone].

## 2018-05-31 NOTE — PROGRESS NOTES
Patient's identity verified with two patient identifiers (name and date of birth). Reviewed record in preparation for visit and have obtained necessary documentation. 1. Have you been to the ER, urgent care clinic since your last visit? Hospitalized since your last visit? No  2. Have you seen or consulted any other health care providers outside of the [address] [place] [address] since your last visit? Include any pap smears or colon screening. No    Chief Complaint   Patient presents with    Depression     6 month follow up    Cholesterol Problem     6 month follow up    Other     Would like to update advanced care plan/living will. Fasting. There are no preventive care reminders to display for this patient.     Wt Readings from Last 3 Encounters:   05/31/18 182 lb (82.6 kg)   11/29/17 188 lb (85.3 kg)   06/28/17 187 lb (84.8 kg)     Temp Readings from Last 3 Encounters:   05/31/18 97.9 °F (36.6 °C) (Oral)   11/29/17 98.2 °F (36.8 °C) (Oral)   06/28/17 97.5 °F (36.4 °C) (Oral)     BP Readings from Last 3 Encounters:   05/31/18 124/82   11/29/17 116/78   06/28/17 130/80     Pulse Readings from Last 3 Encounters:   05/31/18 67   11/29/17 70   06/28/17 69       Learning Assessment:  :     Learning Assessment 5/31/2018 3/20/2017 12/3/2014 2/28/2014   PRIMARY LEARNER Patient Patient Patient Patient   HIGHEST LEVEL OF EDUCATION - PRIMARY LEARNER  4 YEARS OF COLLEGE - 4 YEARS OF COLLEGE 4 YEARS OF COLLEGE   BARRIERS PRIMARY LEARNER NONE - NONE -   CO-LEARNER CAREGIVER No - No -   PRIMARY LANGUAGE ENGLISH ENGLISH Bermudian Bermudian    NEED - - No -   LEARNER PREFERENCE PRIMARY DEMONSTRATION DEMONSTRATION DEMONSTRATION OTHER (COMMENT)   LEARNING SPECIAL TOPICS - - no -   ANSWERED BY patient patient patient patient   RELATIONSHIP SELF SELF SELF SELF       Depression Screening:  :     PHQ over the last two weeks 5/31/2018   Little interest or pleasure in doing things Not at all   Feeling down, depressed or hopeless Not at all   Total Score PHQ 2 0   Trouble falling or staying asleep, or sleeping too much -   Feeling tired or having little energy -   Poor appetite or overeating -   Feeling bad about yourself - or that you are a failure or have let yourself or your family down -   Trouble concentrating on things such as school, work, reading or watching TV -   Moving or speaking so slowly that other people could have noticed; or the opposite being so fidgety that others notice -   Thoughts of being better off dead, or hurting yourself in some way -   PHQ 9 Score -       Fall Risk Assessment:  :     Fall Risk Assessment, last 12 mths 5/31/2018   Able to walk? Yes   Fall in past 12 months? No       Abuse Screening:  :     Abuse Screening Questionnaire 5/31/2018 11/29/2017 12/9/2015 2/28/2014   Do you ever feel afraid of your partner? N N N N   Are you in a relationship with someone who physically or mentally threatens you? N N N N   Is it safe for you to go home?  Sara Moore

## 2018-05-31 NOTE — PROGRESS NOTES
Subjective:      Teresa Vu is a 66 y.o. female who presents today for follow up of her depression and hyperlipidemia and elevated liver enzymes. She has been working on eating less fat in her diet. Has lost about 5 pounds since her last visit in the fall. She denies any chest pain, shortness of breath, syncope, headaches, nausea/vomiting, or any bowel changes. Patient Active Problem List   Diagnosis Code    History of screening mammography Z92.89    [facility]    Pap smear for cervical cancer screening Z12.4    Colon cancer screening Z12.11    S/P cholecystectomy Z90.49    S/P tubal ligation Z98.51    Menopausal state N95.1    H/O bone density study Z92.89    Hyperlipemia E78.5    Depression F32.9    Obesity (BMI 30-39. 9) E66.9    Obstructive sleep apnea syndrome G47.33    Advance directive discussed with patient Z71.89    Fatty liver K76.0     Current Outpatient Prescriptions   Medication Sig Dispense Refill    doxycycline (VIBRAMYCIN) 50 mg capsule TAKE ONE CAPSULE BY MOUTH TWICE A DAY  5    metroNIDAZOLE (METROCREAM) 0.75 % topical cream APPLY CREAM TO FACE TWO TIMES DAILY  3    atorvastatin (LIPITOR) 10 mg tablet TAKE 1 TABLET BY MOUTH EVERY DAY 30 Tab 5    citalopram (CELEXA) 10 mg tablet TAKE 1 TABLET BY MOUTH EVERY DAY 30 Tab 5    OMEPRAZOLE (PRILOSEC PO) Take  by mouth daily as needed.  ibuprofen (ADVIL) 200 mg tablet Take  by mouth every eight (8) hours as needed.  CALCIUM CARBONATE/VITAMIN D3 (CALTRATE 600 + D PO) Take  by mouth daily.  CHOLECALCIFEROL, VITAMIN D3, (VITAMIN D3 PO) Take 1,000 Units by mouth daily.  cetirizine (ZYRTEC) 10 mg tablet Take  by mouth as needed. Review of Systems    Pertinent items are noted in HPI.      Objective:     Visit Vitals    /82 (BP 1 Location: Left arm, BP Patient Position: Sitting)    Pulse 67    Temp 97.9 °F (36.6 °C) (Oral)    Resp 14    Ht 5' 7\" (1.702 m)    Wt 182 lb (82.6 kg)    SpO2 96%    BMI 28.51 kg/m2     General appearance: alert, cooperative, no distress, appears stated age  Head: Normocephalic, without obvious abnormality, atraumatic  Neck: supple, symmetrical, trachea midline, no adenopathy, no carotid bruit and no JVD  Lungs: clear to auscultation bilaterally  Heart: regular rate and rhythm, S1, S2 normal, no murmur, click, rub or gallop  Abdomen: soft, non-tender. Bowel sounds normal. No masses,  no organomegaly  Extremities: extremities normal, atraumatic, no cyanosis or edema  Pulses: 2+ and symmetric    Assessment/Plan:     1. Depression, unspecified depression type  -I evaluated and recommended to continue current doses of medications.     - METABOLIC PANEL, COMPREHENSIVE    2. Mixed hyperlipidemia  -encouraged continuation of low fat diet. - METABOLIC PANEL, COMPREHENSIVE  - LIPID PANEL    3. Encounter for long-term (current) use of medications    - METABOLIC PANEL, COMPREHENSIVE  - LIPID PANEL    4. Fatty liver  -encouraged maintaining a low fat diet.   -check lft's at this time. 5.  Health Care Maintenance    -patient would like to update her AD. She had done her previous one on the [place] [facility]. [facility] to [facility]. Will scan to chart once available. Orders Placed This Encounter    METABOLIC PANEL, COMPREHENSIVE    LIPID PANEL     Follow-up Disposition:     Follow up in 6 months     Return if symptoms worsen or fail to improve. Advised patient to call back or return to office if symptoms worsen/change/persist.     Discussed expected course/resolution/complications of diagnosis in detail with patient. Medication risks/benefits/costs/interactions/alternatives discussed with patient. Patient was given an after visit summary which includes diagnoses, current medications, & vitals. Patient expressed understanding with the diagnosis and plan.

## 2018-06-01 ENCOUNTER — TELEPHONE (OUTPATIENT)
Dept: INTERNAL MEDICINE CLINIC | Age: 63
End: 2018-06-01

## 2018-06-01 LAB
ALBUMIN SERPL-MCNC: 4.1 G/DL (ref 3.6–4.8)
ALBUMIN/GLOB SERPL: 1.9 {RATIO} (ref 1.2–2.2)
ALP SERPL-CCNC: 127 IU/L (ref 39–117)
ALT SERPL-CCNC: 78 IU/L (ref 0–32)
AST SERPL-CCNC: 42 IU/L (ref 0–40)
BILIRUB SERPL-MCNC: 0.5 MG/DL (ref 0–1.2)
BUN SERPL-MCNC: 13 MG/DL (ref 8–27)
BUN/CREAT SERPL: 18 (ref 12–28)
CALCIUM SERPL-MCNC: 9.5 MG/DL (ref 8.7–10.3)
CHLORIDE SERPL-SCNC: 106 MMOL/L (ref 96–106)
CHOLEST SERPL-MCNC: 143 MG/DL (ref 100–199)
CO2 SERPL-SCNC: 24 MMOL/L (ref 18–29)
CREAT SERPL-MCNC: 0.71 MG/DL (ref 0.57–1)
GFR SERPLBLD CREATININE-BSD FMLA CKD-EPI: 106 ML/MIN/1.73
GFR SERPLBLD CREATININE-BSD FMLA CKD-EPI: 92 ML/MIN/1.73
GLOBULIN SER CALC-MCNC: 2.2 G/DL (ref 1.5–4.5)
GLUCOSE SERPL-MCNC: 112 MG/DL (ref 65–99)
HDLC SERPL-MCNC: 40 MG/DL
INTERPRETATION, 910389: NORMAL
LDLC SERPL CALC-MCNC: 79 MG/DL (ref 0–99)
POTASSIUM SERPL-SCNC: 3.8 MMOL/L (ref 3.5–5.2)
PROT SERPL-MCNC: 6.3 G/DL (ref 6–8.5)
SODIUM SERPL-SCNC: 144 MMOL/L (ref 134–144)
TRIGL SERPL-MCNC: 119 MG/DL (ref 0–149)
VLDLC SERPL CALC-MCNC: 24 MG/DL (ref 5–40)

## 2018-06-01 NOTE — TELEPHONE ENCOUNTER
Call to Dimas Masters - she states that she can not add the requested testing (A1C) because they only received a serum gel top tube. No further action needed at this time.

## 2018-06-01 NOTE — TELEPHONE ENCOUNTER
----- Message from Ferny Serrano MD sent at 6/1/2018  1:51 PM EDT -----  Please have [facility] add a hemoglobin A1c to her labs done on 5/31/18. Diagnosis is elevated glucose.      Thanks  JIMMIE

## 2018-06-04 NOTE — PROGRESS NOTES
Need to reduce sugars and carbohydrates in diet. lft's increased again. U/S done 3/2017 showed hepatic steatosis.

## 2018-10-22 RX ORDER — CITALOPRAM 10 MG/1
TABLET ORAL
Qty: 30 TAB | Refills: 5 | Status: SHIPPED | OUTPATIENT
Start: 2018-10-22 | End: 2018-10-26 | Stop reason: SDUPTHER

## 2018-10-22 RX ORDER — ATORVASTATIN CALCIUM 10 MG/1
TABLET, FILM COATED ORAL
Qty: 30 TAB | Refills: 5 | Status: SHIPPED | OUTPATIENT
Start: 2018-10-22 | End: 2018-10-26 | Stop reason: SDUPTHER

## 2018-10-23 ENCOUNTER — OFFICE VISIT (OUTPATIENT)
Dept: INTERNAL MEDICINE CLINIC | Age: 63
End: 2018-10-23

## 2018-10-23 VITALS
HEIGHT: 67 IN | DIASTOLIC BLOOD PRESSURE: 84 MMHG | SYSTOLIC BLOOD PRESSURE: 132 MMHG | TEMPERATURE: 97.7 F | BODY MASS INDEX: 29.19 KG/M2 | OXYGEN SATURATION: 96 % | WEIGHT: 186 LBS | RESPIRATION RATE: 13 BRPM | HEART RATE: 61 BPM

## 2018-10-23 DIAGNOSIS — F32.A DEPRESSION, UNSPECIFIED DEPRESSION TYPE: ICD-10-CM

## 2018-10-23 DIAGNOSIS — K75.81 NASH (NONALCOHOLIC STEATOHEPATITIS): ICD-10-CM

## 2018-10-23 DIAGNOSIS — R74.8 ABNORMAL LIVER ENZYMES: ICD-10-CM

## 2018-10-23 DIAGNOSIS — Z79.899 ENCOUNTER FOR LONG-TERM (CURRENT) USE OF MEDICATIONS: ICD-10-CM

## 2018-10-23 DIAGNOSIS — R73.09 ELEVATED GLUCOSE: ICD-10-CM

## 2018-10-23 DIAGNOSIS — Z00.00 ROUTINE GENERAL MEDICAL EXAMINATION AT A HEALTH CARE FACILITY: Primary | ICD-10-CM

## 2018-10-23 RX ORDER — AMOXICILLIN 500 MG/1
CAPSULE ORAL
Refills: 12 | COMMUNITY
Start: 2018-10-10 | End: 2019-10-28 | Stop reason: ALTCHOICE

## 2018-10-23 NOTE — PROGRESS NOTES
Patient's identity verified with two patient identifiers (name and date of birth). Reviewed record in preparation for visit and have obtained necessary documentation. 1. Have you been to the ER, urgent care clinic since your last visit? Hospitalized since your last visit? No  2. Have you seen or consulted any other health care providers outside of the [address] [place] [address] since your last visit? Include any pap smears or colon screening. No    Chief Complaint   Patient presents with    Complete Physical     Fasting. No EKG today per PCP. Has insurance forms for her and her  today.  Depression     6 month follow up    Cholesterol Problem     6 month follow up      Fasting. Health Maintenance Due   Topic Date Due    Shingrix Vaccine Age 49> (1 of 2)  Patient reports has not had. 03/09/2007    Pneumococcal 19-64 Highest Risk (2 of 3 - PCV13)  Patient reports has not had. 06/28/2018    Influenza Age 9 to Adult   Done per pt, x1wk, [place].  08/01/2018       Wt Readings from Last 3 Encounters:   10/23/18 186 lb (84.4 kg)   05/31/18 182 lb (82.6 kg)   11/29/17 188 lb (85.3 kg)     Temp Readings from Last 3 Encounters:   10/23/18 97.7 °F (36.5 °C) (Oral)   05/31/18 97.9 °F (36.6 °C) (Oral)   11/29/17 98.2 °F (36.8 °C) (Oral)     BP Readings from Last 3 Encounters:   10/23/18 132/84   05/31/18 124/82   11/29/17 116/78     Pulse Readings from Last 3 Encounters:   10/23/18 61   05/31/18 67   11/29/17 70

## 2018-10-23 NOTE — PROGRESS NOTES
Subjective:      Selvin Phillips is a 66 y.o. female who presents today for follow up of her depression, elevated glucose and GARCES. She is also present for her annual exam with a form to be completed. Gyn care is provided by Dr. Merline Kroner. - last visit was 5/2017  Screening mammogram was done at [facility] - date- 3/23/18  Screening colonoscopy was last completed with Dr. Jorge Phan on 2/27/15. Follow up in 5 years. Bone Density testing - being done with Dr. Rohit Diamond intermittently. Patient Active Problem List   Diagnosis Code    History of screening mammography Z92.89    Breast cancer ([facility] C50.919    Pap smear for cervical cancer screening Z12.4    Colon cancer screening Z12.11    S/P cholecystectomy Z90.49    S/P tubal ligation Z98.51    Menopausal state N95.1    H/O bone density study Z92.89    Hyperlipemia E78.5    Depression F32.9    Obesity (BMI 30-39. 9) E66.9    Obstructive sleep apnea syndrome G47.33    Advance directive discussed with patient Z71.89    Fatty liver K76.0     Current Outpatient Medications   Medication Sig Dispense Refill    amoxicillin (AMOXIL) 500 mg capsule TAKE ONE CAPSULE BY MOUTH DAILY WITH FOOD & WATER  12    atorvastatin (LIPITOR) 10 mg tablet TAKE 1 TABLET BY MOUTH EVERY DAY 30 Tab 5    citalopram (CELEXA) 10 mg tablet TAKE 1 TABLET BY MOUTH EVERY DAY 30 Tab 5    metroNIDAZOLE (METROCREAM) 0.75 % topical cream APPLY CREAM TO FACE TWO TIMES DAILY  3    OMEPRAZOLE (PRILOSEC PO) Take  by mouth daily as needed.  ibuprofen (ADVIL) 200 mg tablet Take  by mouth every eight (8) hours as needed.  CALCIUM CARBONATE/VITAMIN D3 (CALTRATE 600 + D PO) Take  by mouth daily.  CHOLECALCIFEROL, VITAMIN D3, (VITAMIN D3 PO) Take 1,000 Units by mouth daily.  cetirizine (ZYRTEC) 10 mg tablet Take  by mouth daily as needed. Review of Systems    A comprehensive review of systems was negative except for that written in the HPI. Objective: Wt Readings from Last 3 Encounters:   10/23/18 186 lb (84.4 kg)   05/31/18 182 lb (82.6 kg)   11/29/17 188 lb (85.3 kg)     Temp Readings from Last 3 Encounters:   10/23/18 97.7 °F (36.5 °C) (Oral)   05/31/18 97.9 °F (36.6 °C) (Oral)   11/29/17 98.2 °F (36.8 °C) (Oral)     BP Readings from Last 3 Encounters:   10/23/18 132/84   05/31/18 124/82   11/29/17 116/78     Pulse Readings from Last 3 Encounters:   10/23/18 61   05/31/18 67   11/29/17 70       Visit Vitals  /84 (BP 1 Location: Left arm, BP Patient Position: Sitting)   Pulse 61   Temp 97.7 °F (36.5 °C) (Oral)   Resp 13   Ht 5' 7\" (1.702 m)   Wt 186 lb (84.4 kg)   SpO2 96%   BMI 29.13 kg/m²     General:  Alert, cooperative, no distress, appears stated age. Head:  Normocephalic, without obvious abnormality, atraumatic. Eyes:  Conjunctivae/corneas clear. PERRL, EOMs intact. .   Ears:  Normal TMs and external ear canals both ears. Nose: Nares normal. Septum midline. Mucosa normal. No drainage or sinus tenderness. Throat: Lips, mucosa, and tongue normal. Teeth and gums normal.   Neck: Supple, symmetrical, trachea midline, no adenopathy, thyroid: no enlargement/tenderness/nodules, no carotid bruit and no JVD. Back:   Symmetric, no curvature. ROM normal. No CVA tenderness. Lungs:   Clear to auscultation bilaterally. Chest wall:  No tenderness or deformity. Heart:  Regular rate and rhythm, S1, S2 normal, no murmur, click, rub or gallop. Breast Exam:  No tenderness, masses, or nipple abnormality. Abdomen:   Soft, non-tender. Bowel sounds normal. No masses,  No organomegaly. Extremities: Extremities normal, atraumatic, no cyanosis or edema. Pulses: 2+ and symmetric all extremities. Skin: Skin color, texture, turgor normal. No rashes or lesions. Lymph nodes: Cervical, supraclavicular, and axillary nodes normal.   Neurologic: CNII-XII intact. Normal strength, sensation and reflexes throughout. Assessment/Plan:     1. Routine general medical examination at a health care facility  -fasting labs today.  -wellness form completed and returned to patient today. - AMB POC EKG ROUTINE W/ 12 LEADS, INTER & REP  - CBC WITH AUTOMATED DIFF  - METABOLIC PANEL, COMPREHENSIVE  - LIPID PANEL  - HEMOGLOBIN A1C WITH EAG  - UA/M W/RFLX CULTURE, ROUTINE    Also, she has additional complaints of the following --.     2. Depression, unspecified depression type  -I evaluated and recommended to continue current doses of medications. - CBC WITH AUTOMATED DIFF  - METABOLIC PANEL, COMPREHENSIVE    3. Elevated glucose  -continue to work on weight reduction and diet modifications.     - METABOLIC PANEL, COMPREHENSIVE    4. Abnormal liver enzymes  -due to GARCES. Check levels today. - METABOLIC PANEL, COMPREHENSIVE    5. GARCES (nonalcoholic steatohepatitis)  -last liver ultrasound done in 2/28/17  -discussed with patient that if her liver enzymes continues to be persistently elevated. Will refer to liver clinic.   -strongly encouraged low fat diet. - LIPID PANEL    6. Encounter for long-term (current) use of medications    - CBC WITH AUTOMATED DIFF  - METABOLIC PANEL, COMPREHENSIVE  - LIPID PANEL  - HEMOGLOBIN A1C WITH EAG  - UA/M W/RFLX CULTURE, ROUTINE     Orders Placed This Encounter    CBC WITH AUTOMATED DIFF    METABOLIC PANEL, COMPREHENSIVE    LIPID PANEL    HEMOGLOBIN A1C WITH EAG    UA/M W/RFLX CULTURE, ROUTINE    AMB POC EKG ROUTINE W/ 12 LEADS, INTER & REP     Order Specific Question:   Reason for Exam:     Answer:   complete physical     Follow-up Disposition:     Follow up in 6 months     Return if symptoms worsen or fail to improve. Advised patient to call back or return to office if symptoms worsen/change/persist.     Discussed expected course/resolution/complications of diagnosis in detail with patient. Medication risks/benefits/costs/interactions/alternatives discussed with patient. Patient was given an after visit summary which includes diagnoses, current medications, & vitals. Patient expressed understanding with the diagnosis and plan.

## 2018-10-23 NOTE — LETTER
10/30/2018 4:47 PM 
 
Ms. Jalen Abdul 
[id number] [facility] [address] Dear Pearl Hernandez: 
 
Please find your most recent results below. Resulted Orders CBC WITH AUTOMATED DIFF Result Value Ref Range WBC 4.8 3.4 - 10.8 x10E3/uL  
 RBC 4.77 3.77 - 5.28 x10E6/uL HGB 14.5 11.1 - 15.9 g/dL HCT 44.3 34.0 - 46.6 % MCV 93 79 - 97 fL  
 MCH 30.4 26.6 - 33.0 pg  
 MCHC 32.7 31.5 - 35.7 g/dL  
 RDW 13.4 12.3 - 15.4 % PLATELET 775 042 - 027 x10E3/uL NEUTROPHILS 53 Not Estab. % Lymphocytes 41 Not Estab. % MONOCYTES 5 Not Estab. % EOSINOPHILS 1 Not Estab. % BASOPHILS 0 Not Estab. %  
 ABS. NEUTROPHILS 2.6 1.4 - 7.0 x10E3/uL Abs Lymphocytes 2.0 0.7 - 3.1 x10E3/uL  
 ABS. MONOCYTES 0.3 0.1 - 0.9 x10E3/uL  
 ABS. EOSINOPHILS 0.0 0.0 - 0.4 x10E3/uL  
 ABS. BASOPHILS 0.0 0.0 - 0.2 x10E3/uL IMMATURE GRANULOCYTES 0 Not Estab. %  
 ABS. IMM. GRANS. 0.0 0.0 - 0.1 x10E3/uL Narrative Performed at:  [address]  [id number] : Samson Drake MD, Phone:  [phone] METABOLIC PANEL, COMPREHENSIVE Result Value Ref Range Glucose 96 65 - 99 mg/dL BUN 13 8 - 27 mg/dL Creatinine 0.64 0.57 - 1.00 mg/dL GFR est non-AA 97 >59 mL/min/1.73 GFR est  >59 mL/min/1.73  
 BUN/Creatinine ratio 20 12 - 28 Sodium 144 134 - 144 mmol/L Potassium 4.0 3.5 - 5.2 mmol/L Chloride 106 96 - 106 mmol/L  
 CO2 25 20 - 29 mmol/L Calcium 9.2 8.7 - 10.3 mg/dL Protein, total 6.3 6.0 - 8.5 g/dL Albumin 4.2 3.6 - 4.8 g/dL GLOBULIN, TOTAL 2.1 1.5 - 4.5 g/dL A-G Ratio 2.0 1.2 - 2.2 Bilirubin, total 0.5 0.0 - 1.2 mg/dL Alk. phosphatase 130 (H) 39 - 117 IU/L  
 AST (SGOT) 32 0 - 40 IU/L  
 ALT (SGPT) 52 (H) 0 - 32 IU/L Narrative Performed at:  [address]  [id number] : Samson Drake MD, Phone:  [phone] LIPID PANEL  
 Result Value Ref Range Cholesterol, total 118 100 - 199 mg/dL Triglyceride 75 0 - 149 mg/dL HDL Cholesterol 42 >39 mg/dL VLDL, calculated 15 5 - 40 mg/dL LDL, calculated 61 0 - 99 mg/dL Narrative Performed at:  [address] [place] [address]  [id number] : Allison David MD, Phone:  [phone] HEMOGLOBIN A1C WITH EAG Result Value Ref Range Hemoglobin A1c 5.4 4.8 - 5.6 % Comment:  
            Prediabetes: 5.7 - 6.4 Diabetes: >6.4 Glycemic control for adults with diabetes: <7.0 Estimated average glucose 108 mg/dL Narrative Performed at:  [address] [place] [address]  [id number] : Allison David MD, Phone:  [phone] UA/M W/RFLX CULTURE, ROUTINE Result Value Ref Range Specific Gravity 1.022 1.005 - 1.030  
 pH (UA) 6.5 5.0 - 7.5 Color Yellow Yellow Appearance Clear Clear Leukocyte Esterase Negative Negative Protein Negative Negative/Trace Glucose Negative Negative Ketone Negative Negative Blood Negative Negative Bilirubin Negative Negative Urobilinogen 1.0 0.2 - 1.0 mg/dL Nitrites Negative Negative Microscopic Examination Comment Comment:  
   Microscopic follows if indicated. Microscopic exam See additional order Comment:  
   Microscopic was indicated and was performed. URINALYSIS REFLEX Comment Comment: This specimen will not reflex to a Urine Culture. Narrative Performed at:  [address] [place] [address]  [id number] : Allison David MD, Phone:  [phone] MICROSCOPIC EXAMINATION Result Value Ref Range WBC 0-5 0 - 5 /hpf  
 RBC 0-2 0 - 2 /hpf Epithelial cells 0-10 0 - 10 /hpf Casts None seen None seen /lpf Mucus Present Not Estab. Bacteria None seen None seen/Few Narrative Performed at:  [address] [place] [address] [facility] [address] [place] [address]  [id number] : Franco Mccarthy MD, Phone:  [phone] RECOMMENDATIONS: 
Your labs are all stable. Your glucose is controlled, but still in the prediabetes range. Your ALT, or liver enzyme, is still slightly elevated. Please continue to work on weight reduction and maintaining a low fat diet. No medication changes at this time. Please call me if you have any questions: [phone] Sincerely, Judson Godinez MD

## 2018-10-24 LAB
ALBUMIN SERPL-MCNC: 4.2 G/DL (ref 3.6–4.8)
ALBUMIN/GLOB SERPL: 2 {RATIO} (ref 1.2–2.2)
ALP SERPL-CCNC: 130 IU/L (ref 39–117)
ALT SERPL-CCNC: 52 IU/L (ref 0–32)
APPEARANCE UR: CLEAR
AST SERPL-CCNC: 32 IU/L (ref 0–40)
BACTERIA #/AREA URNS HPF: NORMAL /[HPF]
BASOPHILS # BLD AUTO: 0 X10E3/UL (ref 0–0.2)
BASOPHILS NFR BLD AUTO: 0 %
BILIRUB SERPL-MCNC: 0.5 MG/DL (ref 0–1.2)
BILIRUB UR QL STRIP: NEGATIVE
BUN SERPL-MCNC: 13 MG/DL (ref 8–27)
BUN/CREAT SERPL: 20 (ref 12–28)
CALCIUM SERPL-MCNC: 9.2 MG/DL (ref 8.7–10.3)
CASTS URNS QL MICRO: NORMAL /LPF
CHLORIDE SERPL-SCNC: 106 MMOL/L (ref 96–106)
CHOLEST SERPL-MCNC: 118 MG/DL (ref 100–199)
CO2 SERPL-SCNC: 25 MMOL/L (ref 20–29)
COLOR UR: YELLOW
CREAT SERPL-MCNC: 0.64 MG/DL (ref 0.57–1)
EOSINOPHIL # BLD AUTO: 0 X10E3/UL (ref 0–0.4)
EOSINOPHIL NFR BLD AUTO: 1 %
EPI CELLS #/AREA URNS HPF: NORMAL /HPF
ERYTHROCYTE [DISTWIDTH] IN BLOOD BY AUTOMATED COUNT: 13.4 % (ref 12.3–15.4)
EST. AVERAGE GLUCOSE BLD GHB EST-MCNC: 108 MG/DL
GLOBULIN SER CALC-MCNC: 2.1 G/DL (ref 1.5–4.5)
GLUCOSE SERPL-MCNC: 96 MG/DL (ref 65–99)
GLUCOSE UR QL: NEGATIVE
HBA1C MFR BLD: 5.4 % (ref 4.8–5.6)
HCT VFR BLD AUTO: 44.3 % (ref 34–46.6)
HDLC SERPL-MCNC: 42 MG/DL
HGB BLD-MCNC: 14.5 G/DL (ref 11.1–15.9)
HGB UR QL STRIP: NEGATIVE
IMM GRANULOCYTES # BLD: 0 X10E3/UL (ref 0–0.1)
IMM GRANULOCYTES NFR BLD: 0 %
INTERPRETATION, 910389: NORMAL
KETONES UR QL STRIP: NEGATIVE
LDLC SERPL CALC-MCNC: 61 MG/DL (ref 0–99)
LEUKOCYTE ESTERASE UR QL STRIP: NEGATIVE
LYMPHOCYTES # BLD AUTO: 2 X10E3/UL (ref 0.7–3.1)
LYMPHOCYTES NFR BLD AUTO: 41 %
MCH RBC QN AUTO: 30.4 PG (ref 26.6–33)
MCHC RBC AUTO-ENTMCNC: 32.7 G/DL (ref 31.5–35.7)
MCV RBC AUTO: 93 FL (ref 79–97)
MICRO URNS: NORMAL
MICRO URNS: NORMAL
MONOCYTES # BLD AUTO: 0.3 X10E3/UL (ref 0.1–0.9)
MONOCYTES NFR BLD AUTO: 5 %
MUCOUS THREADS URNS QL MICRO: PRESENT
NEUTROPHILS # BLD AUTO: 2.6 X10E3/UL (ref 1.4–7)
NEUTROPHILS NFR BLD AUTO: 53 %
NITRITE UR QL STRIP: NEGATIVE
PH UR STRIP: 6.5 [PH] (ref 5–7.5)
PLATELET # BLD AUTO: 254 X10E3/UL (ref 150–379)
POTASSIUM SERPL-SCNC: 4 MMOL/L (ref 3.5–5.2)
PROT SERPL-MCNC: 6.3 G/DL (ref 6–8.5)
PROT UR QL STRIP: NEGATIVE
RBC # BLD AUTO: 4.77 X10E6/UL (ref 3.77–5.28)
RBC #/AREA URNS HPF: NORMAL /HPF
SODIUM SERPL-SCNC: 144 MMOL/L (ref 134–144)
SP GR UR: 1.02 (ref 1–1.03)
TRIGL SERPL-MCNC: 75 MG/DL (ref 0–149)
URINALYSIS REFLEX, 377202: NORMAL
UROBILINOGEN UR STRIP-MCNC: 1 MG/DL (ref 0.2–1)
VLDLC SERPL CALC-MCNC: 15 MG/DL (ref 5–40)
WBC # BLD AUTO: 4.8 X10E3/UL (ref 3.4–10.8)
WBC #/AREA URNS HPF: NORMAL /HPF

## 2018-10-26 ENCOUNTER — TELEPHONE (OUTPATIENT)
Dept: INTERNAL MEDICINE CLINIC | Age: 63
End: 2018-10-26

## 2018-10-26 RX ORDER — ATORVASTATIN CALCIUM 10 MG/1
TABLET, FILM COATED ORAL
Qty: 30 TAB | Refills: 5 | Status: SHIPPED | OUTPATIENT
Start: 2018-10-26 | End: 2018-10-29 | Stop reason: SDUPTHER

## 2018-10-26 RX ORDER — CITALOPRAM 10 MG/1
TABLET ORAL
Qty: 30 TAB | Refills: 5 | Status: SHIPPED | OUTPATIENT
Start: 2018-10-26 | End: 2018-10-29 | Stop reason: SDUPTHER

## 2018-10-29 NOTE — TELEPHONE ENCOUNTER
Rx called into local pharmacy VM on file. Verbal order provided to [facility] pharmacists.      Requested Prescriptions     Signed Prescriptions Disp Refills    atorvastatin (LIPITOR) 10 mg tablet 30 Tab 5     Sig: TAKE 1 TABLET BY MOUTH EVERY DAY     Authorizing Provider: JERRELL NAVARRETE    citalopram (CELEXA) 10 mg tablet 30 Tab 5     Sig: TAKE 1 TABLET BY MOUTH EVERY DAY     Authorizing Provider: Filiberto Huerta

## 2019-04-08 RX ORDER — CITALOPRAM 10 MG/1
TABLET ORAL
Qty: 30 TAB | Refills: 5 | Status: SHIPPED | OUTPATIENT
Start: 2019-04-08 | End: 2019-10-31 | Stop reason: SDUPTHER

## 2019-04-08 RX ORDER — ATORVASTATIN CALCIUM 10 MG/1
TABLET, FILM COATED ORAL
Qty: 30 TAB | Refills: 5 | Status: SHIPPED | OUTPATIENT
Start: 2019-04-08 | End: 2019-10-29 | Stop reason: SDUPTHER

## 2019-04-25 ENCOUNTER — OFFICE VISIT (OUTPATIENT)
Dept: INTERNAL MEDICINE CLINIC | Age: 64
End: 2019-04-25

## 2019-04-25 VITALS
BODY MASS INDEX: 28.91 KG/M2 | WEIGHT: 184.2 LBS | TEMPERATURE: 98 F | SYSTOLIC BLOOD PRESSURE: 116 MMHG | HEIGHT: 67 IN | DIASTOLIC BLOOD PRESSURE: 80 MMHG | RESPIRATION RATE: 18 BRPM | OXYGEN SATURATION: 96 % | HEART RATE: 64 BPM

## 2019-04-25 DIAGNOSIS — Z79.899 ENCOUNTER FOR LONG-TERM (CURRENT) USE OF MEDICATIONS: ICD-10-CM

## 2019-04-25 DIAGNOSIS — F32.A DEPRESSION, UNSPECIFIED DEPRESSION TYPE: Primary | ICD-10-CM

## 2019-04-25 DIAGNOSIS — R73.09 ELEVATED GLUCOSE: ICD-10-CM

## 2019-04-25 DIAGNOSIS — K75.81 NASH (NONALCOHOLIC STEATOHEPATITIS): ICD-10-CM

## 2019-04-25 NOTE — PROGRESS NOTES
Subjective:  
  
Carmina Moura is a 60 y.o. female who presents today for follow up of her depression, elevated glucose and GARCES. Need shingrix Need mammogram.  
 
She is maintaining a low fat diet. No weight reduction. She walks her dog daily. She denies any chest pain, shortness of breath, syncope, headaches, nausea/vomiting, or any bowel changes. Patient Active Problem List  
Diagnosis Code  History of screening mammography Z92.89  Breast cancer ([facility] C50.919  
 Pap smear for cervical cancer screening Z12.4  
 Colon cancer screening Z12.11  
 S/P cholecystectomy Z90.49  S/P tubal ligation Z98.51  
 Menopausal state N95.1  
 H/O bone density study Z92.89  
 Hyperlipemia E78.5  Depression F32.9  Obesity (BMI 30-39. 9) E66.9  Obstructive sleep apnea syndrome G47.33  
 Advance directive discussed with patient Z71.89  Fatty liver K76.0 Current Outpatient Medications Medication Sig Dispense Refill  varicella-zoster recombinant, PF, (SHINGRIX) 50 mcg/0.5 mL susr injection 0.5 mL by IntraMUSCular route once for 1 dose. Repeat in 2-6 months 0.5 mL 1  
 atorvastatin (LIPITOR) 10 mg tablet TAKE 1 TABLET NIGHTLY 30 Tab 5  citalopram (CELEXA) 10 mg tablet TAKE 1 TABLET DAILY 30 Tab 5  
 amoxicillin (AMOXIL) 500 mg capsule TAKE ONE CAPSULE BY MOUTH DAILY WITH FOOD & WATER  12  
 metroNIDAZOLE (METROCREAM) 0.75 % topical cream APPLY CREAM TO FACE TWO TIMES DAILY  3  
 OMEPRAZOLE (PRILOSEC PO) Take  by mouth daily as needed.  ibuprofen (ADVIL) 200 mg tablet Take  by mouth every eight (8) hours as needed.  CALCIUM CARBONATE/VITAMIN D3 (CALTRATE 600 + D PO) Take  by mouth daily.  CHOLECALCIFEROL, VITAMIN D3, (VITAMIN D3 PO) Take 1,000 Units by mouth daily.  cetirizine (ZYRTEC) 10 mg tablet Take  by mouth daily as needed. Review of Systems Pertinent items are noted in HPI. Objective: Wt Readings from Last 3 Encounters: 19 184 lb 3.2 oz (83.6 kg) 10/23/18 186 lb (84.4 kg) 18 182 lb (82.6 kg) Visit Vitals /80 (BP 1 Location: Left arm, BP Patient Position: Sitting) Pulse 64 Temp 98 °F (36.7 °C) (Oral) Resp 18 Ht 5' 7\" (1.702 m) Wt 184 lb 3.2 oz (83.6 kg) SpO2 96% BMI 28.85 kg/m² General appearance: alert, cooperative, no distress, appears stated age Head: Normocephalic, without obvious abnormality, atraumatic Neck: supple, symmetrical, trachea midline, no adenopathy, no carotid bruit and no JVD Lungs: clear to auscultation bilaterally Heart: regular rate and rhythm, S1, S2 normal, no murmur, click, rub or gallop Abdomen: soft, non-tender. Bowel sounds normal. No masses,  no organomegaly Extremities: extremities normal, atraumatic, no cyanosis or edema Assessment/Plan: 1. Depression, unspecified depression type 
-I evaluated and recommended to continue current doses of medications. 2. Elevated glucose 
-continue low sugar and carb diet. Need to work on weight reduction.  
 
- METABOLIC PANEL, COMPREHENSIVE 
- HEMOGLOBIN A1C WITH EAG 3. GARCES (nonalcoholic steatohepatitis) 
-check liver enzymes today. - METABOLIC PANEL, COMPREHENSIVE 4. Encounter for long-term (current) use of medications 5. Health Care Maintenance   
-recommended getting the shingles vaccine. A prescription for Shingrix was given to the patient.   
-reminded patient to get her yearly mammogram.  
 
Orders Placed This Encounter  METABOLIC PANEL, COMPREHENSIVE  
 HEMOGLOBIN A1C WITH EAG  varicella-zoster recombinant, PF, (SHINGRIX) 50 mcg/0.5 mL susr injection Si.5 mL by IntraMUSCular route once for 1 dose. Repeat in 2-6 months Dispense:  0.5 mL Refill:  1 Follow-up Disposition:  
 
Follow up in 6 months Return if symptoms worsen or fail to improve.   
Advised patient to call back or return to office if symptoms worsen/change/persist.  
 
 Discussed expected course/resolution/complications of diagnosis in detail with patient. Medication risks/benefits/costs/interactions/alternatives discussed with patient. Patient was given an after visit summary which includes diagnoses, current medications, & vitals. Patient expressed understanding with the diagnosis and plan.

## 2019-04-25 NOTE — PROGRESS NOTES
Reviewed record in preparation for visit and have obtained necessary documentation. Identified pt with two pt identifiers(name and ). Health Maintenance Due Topic  BREAST CANCER SCRN MAMMOGRAM   
 
 
 
Chief Complaint Patient presents with  Depression 6 mth f/u  Blood sugar problem 6 mth f/u Wt Readings from Last 3 Encounters:  
19 184 lb 3.2 oz (83.6 kg) 10/23/18 186 lb (84.4 kg) 18 182 lb (82.6 kg) Temp Readings from Last 3 Encounters:  
10/23/18 97.7 °F (36.5 °C) (Oral) 18 97.9 °F (36.6 °C) (Oral)  
17 98.2 °F (36.8 °C) (Oral) BP Readings from Last 3 Encounters:  
10/23/18 132/84  
18 124/82  
17 116/78 Pulse Readings from Last 3 Encounters:  
10/23/18 61  
18 67  
17 70 Learning Assessment: 
:  
 
Learning Assessment 2019 2018 3/20/2017 12/3/2014 2014 PRIMARY LEARNER Patient Patient Patient Patient Patient HIGHEST LEVEL OF EDUCATION - PRIMARY LEARNER  4 YEARS OF COLLEGE 4 YEARS OF COLLEGE - 4 YEARS OF COLLEGE 4 YEARS OF COLLEGE  
BARRIERS PRIMARY LEARNER NONE NONE - NONE -  
CO-LEARNER CAREGIVER - No - No - PRIMARY LANGUAGE ENGLISH ENGLISH ENGLISH Swedish  NEED - - - No -  
LEARNER PREFERENCE PRIMARY DEMONSTRATION DEMONSTRATION DEMONSTRATION DEMONSTRATION OTHER (COMMENT) LEARNING SPECIAL TOPICS - - - no -  
ANSWERED BY patient patient patient patient patient RELATIONSHIP SELF SELF SELF SELF SELF Depression Screening: 
:  
 
3 most recent PHQ Screens 2019 Little interest or pleasure in doing things Not at all Feeling down, depressed, irritable, or hopeless Not at all Total Score PHQ 2 0 Trouble falling or staying asleep, or sleeping too much - Feeling tired or having little energy - Poor appetite, weight loss, or overeating - Feeling bad about yourself - or that you are a failure or have let yourself or your family down -  
 Trouble concentrating on things such as school, work, reading, or watching TV - Moving or speaking so slowly that other people could have noticed; or the opposite being so fidgety that others notice - Thoughts of being better off dead, or hurting yourself in some way -  
PHQ 9 Score - Fall Risk Assessment: 
:  
 
Fall Risk Assessment, last 12 mths 4/25/2019 Able to walk? Yes Fall in past 12 months? No  
 
 
Abuse Screening: 
:  
 
Abuse Screening Questionnaire 4/25/2019 5/31/2018 11/29/2017 12/9/2015 2/28/2014 Do you ever feel afraid of your partner? N N N N N Are you in a relationship with someone who physically or mentally threatens you? N N N N N Is it safe for you to go home? Josue German Coordination of Care Questionnaire: 
:  
 
1) Have you been to an emergency room, urgent care clinic since your last visit? no  
Hospitalized since your last visit? no          
 
2) Have you seen or consulted any other health care providers outside of [address] [place] [address] since your last visit? no  (Include any pap smears or colon screenings in this section.) 3) Do you have an Advance Directive on file?yes 4) Are you interested in receiving information on Advance Directives? NO Patient is accompanied by self I have received verbal consent from Claude Rush to discuss any/all medical information while they are present in the room.

## 2019-04-26 LAB
ALBUMIN SERPL-MCNC: 4.1 G/DL (ref 3.6–4.8)
ALBUMIN/GLOB SERPL: 1.7 {RATIO} (ref 1.2–2.2)
ALP SERPL-CCNC: 121 IU/L (ref 39–117)
ALT SERPL-CCNC: 33 IU/L (ref 0–32)
AST SERPL-CCNC: 21 IU/L (ref 0–40)
BILIRUB SERPL-MCNC: 0.5 MG/DL (ref 0–1.2)
BUN SERPL-MCNC: 17 MG/DL (ref 8–27)
BUN/CREAT SERPL: 27 (ref 12–28)
CALCIUM SERPL-MCNC: 9.3 MG/DL (ref 8.7–10.3)
CHLORIDE SERPL-SCNC: 107 MMOL/L (ref 96–106)
CO2 SERPL-SCNC: 24 MMOL/L (ref 20–29)
CREAT SERPL-MCNC: 0.63 MG/DL (ref 0.57–1)
EST. AVERAGE GLUCOSE BLD GHB EST-MCNC: 114 MG/DL
GLOBULIN SER CALC-MCNC: 2.4 G/DL (ref 1.5–4.5)
GLUCOSE SERPL-MCNC: 98 MG/DL (ref 65–99)
HBA1C MFR BLD: 5.6 % (ref 4.8–5.6)
POTASSIUM SERPL-SCNC: 4.4 MMOL/L (ref 3.5–5.2)
PROT SERPL-MCNC: 6.5 G/DL (ref 6–8.5)
SODIUM SERPL-SCNC: 144 MMOL/L (ref 134–144)

## 2019-10-28 ENCOUNTER — OFFICE VISIT (OUTPATIENT)
Dept: INTERNAL MEDICINE CLINIC | Age: 64
End: 2019-10-28

## 2019-10-28 VITALS
TEMPERATURE: 98 F | DIASTOLIC BLOOD PRESSURE: 58 MMHG | SYSTOLIC BLOOD PRESSURE: 118 MMHG | WEIGHT: 190 LBS | OXYGEN SATURATION: 96 % | HEART RATE: 69 BPM | HEIGHT: 66 IN | BODY MASS INDEX: 30.53 KG/M2 | RESPIRATION RATE: 16 BRPM

## 2019-10-28 DIAGNOSIS — Z00.00 ROUTINE GENERAL MEDICAL EXAMINATION AT A HEALTH CARE FACILITY: Primary | ICD-10-CM

## 2019-10-28 DIAGNOSIS — Z79.899 ENCOUNTER FOR LONG-TERM (CURRENT) USE OF MEDICATIONS: ICD-10-CM

## 2019-10-28 DIAGNOSIS — R73.09 ELEVATED GLUCOSE: ICD-10-CM

## 2019-10-28 DIAGNOSIS — F32.A DEPRESSION, UNSPECIFIED DEPRESSION TYPE: ICD-10-CM

## 2019-10-28 DIAGNOSIS — K75.81 NASH (NONALCOHOLIC STEATOHEPATITIS): ICD-10-CM

## 2019-10-28 RX ORDER — MENTHOL
1000 GEL (GRAM) TOPICAL DAILY
COMMUNITY

## 2019-10-28 NOTE — PROGRESS NOTES
Subjective:      Ana Jean Baptiste is a 60 y.o. female who presents today for her annual exam and follow up of her depression, elevated glucose and GARCES. Gyn care is provided by [facility]. - last visit was 3/2019  Screening mammogram was done at [facility] - date- 6/4/19  Screening colonoscopy was last completed with Dr. Khurram Montes 2/27/15 follow up 2020  Bone Density testing - followed by Dr. Lars Toribio    No new concerns at this time. Due for:  -second shingrix    Patient Active Problem List   Diagnosis Code    History of screening mammography Z92.89    History of breast cancer Z85.3    Pap smear for cervical cancer screening Z12.4    Colon cancer screening Z12.11    S/P cholecystectomy Z90.49    S/P tubal ligation Z98.51    H/O bone density study Z92.89    Hyperlipemia E78.5    Depression F32.9    Obesity (BMI 30-39. 9) E66.9    Obstructive sleep apnea syndrome G47.33    Advance directive discussed with patient Z71.89    Fatty liver K76.0     Current Outpatient Medications   Medication Sig Dispense Refill    vitamin e (E GEMS) 1,000 unit capsule Take 1,000 Units by mouth daily.  atorvastatin (LIPITOR) 10 mg tablet TAKE 1 TABLET NIGHTLY 30 Tab 5    citalopram (CELEXA) 10 mg tablet TAKE 1 TABLET DAILY 30 Tab 5    OMEPRAZOLE (PRILOSEC PO) Take  by mouth daily as needed.  ibuprofen (ADVIL) 200 mg tablet Take  by mouth every eight (8) hours as needed.  CALCIUM CARBONATE/VITAMIN D3 (CALTRATE 600 + D PO) Take  by mouth daily.  CHOLECALCIFEROL, VITAMIN D3, (VITAMIN D3 PO) Take 1,000 Units by mouth daily.  cetirizine (ZYRTEC) 10 mg tablet Take  by mouth daily as needed. Review of Systems    A comprehensive review of systems was negative except for that written in the HPI. Objective:      Wt Readings from Last 3 Encounters:   10/28/19 190 lb (86.2 kg)   04/25/19 184 lb 3.2 oz (83.6 kg)   10/23/18 186 lb (84.4 kg)       Visit Vitals  /58 (BP 1 Location: Left arm, BP Patient Position: Sitting)   Pulse 69   Temp 98 °F (36.7 °C) (Oral)   Resp 16   Ht 5' 5.75\" (1.67 m)   Wt 190 lb (86.2 kg)   SpO2 96%   BMI 30.90 kg/m²     General:  Alert, cooperative, no distress, appears stated age. Head:  Normocephalic, without obvious abnormality, atraumatic. Eyes:  Conjunctivae/corneas clear. PERRL, EOMs intact. Ears:  Normal TMs and external ear canals both ears. Nose: Nares normal. Septum midline. Mucosa normal. No drainage or sinus tenderness. Throat: Lips, mucosa, and tongue normal. Teeth and gums normal.   Neck: Supple, symmetrical, trachea midline, no adenopathy, thyroid: no enlargement/tenderness/nodules, no carotid bruit and no JVD. Back:   Symmetric, no curvature. ROM normal. No CVA tenderness. Lungs:   Clear to auscultation bilaterally. Chest wall:  No tenderness or deformity. Heart:  Regular rate and rhythm, S1, S2 normal, no murmur, click, rub or gallop. Breast Exam:  No tenderness, masses, or nipple abnormality. Abdomen:   Soft, non-tender. Bowel sounds normal. No masses,  No organomegaly. Extremities: Extremities normal, atraumatic, no cyanosis or edema. Pulses: 2+ and symmetric all extremities. Skin: Skin color, texture, turgor normal. No rashes or lesions. Lymph nodes: Cervical, supraclavicular, and axillary nodes normal.   Neurologic: CNII-XII intact. Normal strength, sensation        Assessment/Plan:     1. Routine general medical examination at a health care facility  -fasting labs due at this time. - AMB POC EKG ROUTINE W/ 12 LEADS, INTER & REP  - CBC WITH AUTOMATED DIFF  - METABOLIC PANEL, COMPREHENSIVE  - LIPID PANEL  - UA WITH REFLEX MICRO AND CULTURE    Also, she has additional complaints of the following --.    2. Depression, unspecified depression type  -I evaluated and recommended to continue current doses of medications.      3. Elevated glucose  -need to maintain low sugar and carb diet.     - HEMOGLOBIN A1C WITH EAG    4. GARCES (nonalcoholic steatohepatitis)  -check liver enzymes. 5. Encounter for long-term (current) use of medications    Orders Placed This Encounter    CBC WITH AUTOMATED DIFF    METABOLIC PANEL, COMPREHENSIVE    LIPID PANEL    UA WITH REFLEX MICRO AND CULTURE    HEMOGLOBIN A1C WITH EAG    AMB POC EKG ROUTINE W/ 12 LEADS, INTER & REP     Order Specific Question:   Reason for Exam:     Answer:   CPE    vitamin e (E GEMS) 1,000 unit capsule     Sig: Take 1,000 Units by mouth daily. Follow-up Disposition:     Follow up in 6 months     Return if symptoms worsen or fail to improve. Advised patient to call back or return to office if symptoms worsen/change/persist.     Discussed expected course/resolution/complications of diagnosis in detail with patient. Medication risks/benefits/costs/interactions/alternatives discussed with patient. Patient was given an after visit summary which includes diagnoses, current medications, & vitals. Patient expressed understanding with the diagnosis and plan.

## 2019-10-28 NOTE — PROGRESS NOTES
Verified name and birth date for privacy precautions. Chart reviewed in preparation for today's visit.      Chief Complaint   Patient presents with    Complete Physical     not fasting           Health Maintenance Due   Topic    Shingrix Vaccine Age 49> (2 of 2)    Influenza Age 5 to Adult     COLONOSCOPY          Wt Readings from Last 3 Encounters:   10/28/19 190 lb (86.2 kg)   04/25/19 184 lb 3.2 oz (83.6 kg)   10/23/18 186 lb (84.4 kg)     Temp Readings from Last 3 Encounters:   10/28/19 98 °F (36.7 °C) (Oral)   04/25/19 98 °F (36.7 °C) (Oral)   10/23/18 97.7 °F (36.5 °C) (Oral)     BP Readings from Last 3 Encounters:   10/28/19 118/58   04/25/19 116/80   10/23/18 132/84     Pulse Readings from Last 3 Encounters:   10/28/19 69   04/25/19 64   10/23/18 61         Learning Assessment:  :     Learning Assessment 4/25/2019 5/31/2018 3/20/2017 12/3/2014 2/28/2014   PRIMARY LEARNER Patient Patient Patient Patient Patient   HIGHEST LEVEL OF EDUCATION - PRIMARY LEARNER  4 YEARS OF COLLEGE 4 YEARS OF COLLEGE - 4 YEARS OF COLLEGE 4 YEARS OF COLLEGE   BARRIERS PRIMARY LEARNER NONE NONE - NONE -   CO-LEARNER CAREGIVER - No - No -   PRIMARY LANGUAGE ENGLISH ENGLISH ENGLISH Moz[place]ican Mozambican    NEED - - - No -   LEARNER PREFERENCE PRIMARY DEMONSTRATION DEMONSTRATION DEMONSTRATION DEMONSTRATION OTHER (COMMENT)   LEARNING SPECIAL TOPICS - - - no -   ANSWERED BY patient patient patient patient patient   RELATIONSHIP SELF SELF SELF SELF SELF       Depression Screening:  :     3 most recent PHQ Screens 4/25/2019   Little interest or pleasure in doing things Not at all   Feeling down, depressed, irritable, or hopeless Not at all   Total Score PHQ 2 0   Trouble falling or staying asleep, or sleeping too much -   Feeling tired or having little energy -   Poor appetite, weight loss, or overeating -   Feeling bad about yourself - or that you are a failure or have let yourself or your family down -   Trouble concentrating on things such as school, work, reading, or watching TV -   Moving or speaking so slowly that other people could have noticed; or the opposite being so fidgety that others notice -   Thoughts of being better off dead, or hurting yourself in some way -   PHQ 9 Score -       Fall Risk Assessment:  :     Fall Risk Assessment, last 12 mths 4/25/2019   Able to walk? Yes   Fall in past 12 months? No       Abuse Screening:  :     Abuse Screening Questionnaire 4/25/2019 5/31/2018 11/29/2017 12/9/2015 2/28/2014   Do you ever feel afraid of your partner? N N N N N   Are you in a relationship with someone who physically or mentally threatens you? N N N N N   Is it safe for you to go home? Y Y Y Y Y       Coordination of Care Questionnaire:  :     1) Have you been to an emergency room, urgent care clinic since your last visit? no   Hospitalized since your last visit? no             2) Have you seen or consulted any other health care providers outside of [address] [place] [address] since your last visit? no  (Include any pap smears or colon screenings in this section.)    3) Do you have an Advance Directive on file?  yes          ------------------------------------------------

## 2019-10-29 RX ORDER — ATORVASTATIN CALCIUM 10 MG/1
TABLET, FILM COATED ORAL
Qty: 90 TAB | Refills: 1 | Status: SHIPPED | OUTPATIENT
Start: 2019-10-29 | End: 2020-04-27

## 2019-10-31 RX ORDER — CITALOPRAM 10 MG/1
TABLET ORAL
Qty: 30 TAB | Refills: 5 | Status: SHIPPED | OUTPATIENT
Start: 2019-10-31 | End: 2020-05-07

## 2019-11-09 LAB
ALBUMIN SERPL-MCNC: 4.3 G/DL (ref 3.6–4.8)
ALBUMIN/GLOB SERPL: 1.8 {RATIO} (ref 1.2–2.2)
ALP SERPL-CCNC: 133 IU/L (ref 39–117)
ALT SERPL-CCNC: 26 IU/L (ref 0–32)
APPEARANCE UR: CLEAR
AST SERPL-CCNC: 17 IU/L (ref 0–40)
BACTERIA #/AREA URNS HPF: ABNORMAL /[HPF]
BASOPHILS # BLD AUTO: 0 X10E3/UL (ref 0–0.2)
BASOPHILS NFR BLD AUTO: 1 %
BILIRUB SERPL-MCNC: 0.6 MG/DL (ref 0–1.2)
BILIRUB UR QL STRIP: NEGATIVE
BUN SERPL-MCNC: 15 MG/DL (ref 8–27)
BUN/CREAT SERPL: 21 (ref 12–28)
CALCIUM SERPL-MCNC: 9.4 MG/DL (ref 8.7–10.3)
CASTS URNS MICRO: ABNORMAL
CASTS URNS QL MICRO: PRESENT /LPF
CHLORIDE SERPL-SCNC: 106 MMOL/L (ref 96–106)
CHOLEST SERPL-MCNC: 172 MG/DL (ref 100–199)
CO2 SERPL-SCNC: 25 MMOL/L (ref 20–29)
COLOR UR: YELLOW
CREAT SERPL-MCNC: 0.73 MG/DL (ref 0.57–1)
CRYSTALS URNS MICRO: ABNORMAL
EOSINOPHIL # BLD AUTO: 0.1 X10E3/UL (ref 0–0.4)
EOSINOPHIL NFR BLD AUTO: 1 %
EPI CELLS #/AREA URNS HPF: ABNORMAL /HPF (ref 0–10)
ERYTHROCYTE [DISTWIDTH] IN BLOOD BY AUTOMATED COUNT: 12 % (ref 12.3–15.4)
EST. AVERAGE GLUCOSE BLD GHB EST-MCNC: 114 MG/DL
GLOBULIN SER CALC-MCNC: 2.4 G/DL (ref 1.5–4.5)
GLUCOSE SERPL-MCNC: 102 MG/DL (ref 65–99)
GLUCOSE UR QL: NEGATIVE
HBA1C MFR BLD: 5.6 % (ref 4.8–5.6)
HCT VFR BLD AUTO: 45 % (ref 34–46.6)
HDLC SERPL-MCNC: 45 MG/DL
HGB BLD-MCNC: 15.4 G/DL (ref 11.1–15.9)
HGB UR QL STRIP: NEGATIVE
IMM GRANULOCYTES # BLD AUTO: 0 X10E3/UL (ref 0–0.1)
IMM GRANULOCYTES NFR BLD AUTO: 0 %
KETONES UR QL STRIP: NEGATIVE
LDLC SERPL CALC-MCNC: 102 MG/DL (ref 0–99)
LEUKOCYTE ESTERASE UR QL STRIP: NEGATIVE
LYMPHOCYTES # BLD AUTO: 1.5 X10E3/UL (ref 0.7–3.1)
LYMPHOCYTES NFR BLD AUTO: 21 %
MCH RBC QN AUTO: 30.8 PG (ref 26.6–33)
MCHC RBC AUTO-ENTMCNC: 34.2 G/DL (ref 31.5–35.7)
MCV RBC AUTO: 90 FL (ref 79–97)
MICRO URNS: NORMAL
MICRO URNS: NORMAL
MONOCYTES # BLD AUTO: 0.5 X10E3/UL (ref 0.1–0.9)
MONOCYTES NFR BLD AUTO: 7 %
MUCOUS THREADS URNS QL MICRO: PRESENT
NEUTROPHILS # BLD AUTO: 5.1 X10E3/UL (ref 1.4–7)
NEUTROPHILS NFR BLD AUTO: 70 %
NITRITE UR QL STRIP: NEGATIVE
PH UR STRIP: 5.5 [PH] (ref 5–7.5)
PLATELET # BLD AUTO: 299 X10E3/UL (ref 150–450)
POTASSIUM SERPL-SCNC: 4.1 MMOL/L (ref 3.5–5.2)
PROT SERPL-MCNC: 6.7 G/DL (ref 6–8.5)
PROT UR QL STRIP: NEGATIVE
RBC # BLD AUTO: 5 X10E6/UL (ref 3.77–5.28)
RBC #/AREA URNS HPF: ABNORMAL /HPF (ref 0–2)
SODIUM SERPL-SCNC: 145 MMOL/L (ref 134–144)
SP GR UR: 1.02 (ref 1–1.03)
TRIGL SERPL-MCNC: 126 MG/DL (ref 0–149)
UNIDENT CRYS URNS QL MICRO: PRESENT
URINALYSIS REFLEX, 377202: NORMAL
UROBILINOGEN UR STRIP-MCNC: 0.2 MG/DL (ref 0.2–1)
VLDLC SERPL CALC-MCNC: 25 MG/DL (ref 5–40)
WBC # BLD AUTO: 7.3 X10E3/UL (ref 3.4–10.8)
WBC #/AREA URNS HPF: ABNORMAL /HPF (ref 0–5)

## 2020-04-27 RX ORDER — ATORVASTATIN CALCIUM 10 MG/1
TABLET, FILM COATED ORAL
Qty: 90 TAB | Refills: 1 | Status: SHIPPED | OUTPATIENT
Start: 2020-04-27 | End: 2020-10-20

## 2020-05-12 ENCOUNTER — VIRTUAL VISIT (OUTPATIENT)
Dept: INTERNAL MEDICINE CLINIC | Age: 65
End: 2020-05-12

## 2020-05-12 DIAGNOSIS — E78.2 MIXED HYPERLIPIDEMIA: ICD-10-CM

## 2020-05-12 DIAGNOSIS — Z79.899 ENCOUNTER FOR LONG-TERM (CURRENT) USE OF MEDICATIONS: ICD-10-CM

## 2020-05-12 DIAGNOSIS — R73.09 ELEVATED GLUCOSE: ICD-10-CM

## 2020-05-12 DIAGNOSIS — K75.81 NASH (NONALCOHOLIC STEATOHEPATITIS): ICD-10-CM

## 2020-05-12 DIAGNOSIS — F32.A DEPRESSION, UNSPECIFIED DEPRESSION TYPE: Primary | ICD-10-CM

## 2020-05-12 RX ORDER — CITALOPRAM 10 MG/1
10 TABLET ORAL DAILY
Qty: 90 TAB | Refills: 1 | Status: SHIPPED | OUTPATIENT
Start: 2020-05-12 | End: 2020-12-15

## 2020-05-12 NOTE — PROGRESS NOTES
Zander Decker is a 63 y.o. female who was seen by synchronous (real-time) audio-video technology on 5/12/2020. She confirmed that, for purposes of billing, this is a virtual visit with her provider for which we will submit a claim for reimbursement to her insurance company. She is aware that she will be responsible for any copays, coinsurance amounts or other amounts not covered by her insurance company. Do you accept - YES    This visit was completed was completed virtually using [employer] Me      Subjective:   Jalen Hancockgermán Yepez was seen for follow up of her depression , elevated glucose, hyperlipidemia  and GARCES. -she has no new concerns.  -compliant with use of medications. Her celexa dose is working well to control her depression at this time. Prior to Admission medications    Medication Sig Start Date End Date Taking? Authorizing Provider   citalopram (CELEXA) 10 mg tablet Take 1 Tab by mouth daily. Need office visit for further refills 5/7/20  Yes Gail Moura MD   atorvastatin (LIPITOR) 10 mg tablet TAKE 1 TABLET BY MOUTH EACH NIGHT 4/27/20  Yes Gail Moura MD   vitamin e (E GEMS) 1,000 unit capsule Take 1,000 Units by mouth daily. Yes Provider, Historical   OMEPRAZOLE (PRILOSEC PO) Take  by mouth daily as needed. Yes Provider, Historical   ibuprofen (ADVIL) 200 mg tablet Take  by mouth every eight (8) hours as needed. Yes Provider, Historical   CALCIUM CARBONATE/VITAMIN D3 (CALTRATE 600 + D PO) Take  by mouth daily. Yes Provider, Historical   CHOLECALCIFEROL, VITAMIN D3, (VITAMIN D3 PO) Take 1,000 Units by mouth daily. Yes Provider, Historical   cetirizine (ZYRTEC) 10 mg tablet Take  by mouth daily as needed.    Yes Provider, Historical     No Known Allergies    Patient Active Problem List   Diagnosis Code    History of screening mammography Z92.89    History of breast cancer Z85.3    Pap smear for cervical cancer screening Z12.4    Colon cancer screening Z12.11    S/P cholecystectomy Z90.49  S/P tubal ligation Z98.51    H/O bone density study Z92.89    Hyperlipemia E78.5    Depression F32.9    Obesity (BMI 30-39. 9) E66.9    Obstructive sleep apnea syndrome G47.33    Advance directive discussed with patient Z71.89    Fatty liver K76.0     Current Outpatient Medications   Medication Sig Dispense Refill    citalopram (CELEXA) 10 mg tablet Take 1 Tab by mouth daily. 90 Tab 1    atorvastatin (LIPITOR) 10 mg tablet TAKE 1 TABLET BY MOUTH EACH NIGHT 90 Tab 1    vitamin e (E GEMS) 1,000 unit capsule Take 1,000 Units by mouth daily.  OMEPRAZOLE (PRILOSEC PO) Take  by mouth daily as needed.  ibuprofen (ADVIL) 200 mg tablet Take  by mouth every eight (8) hours as needed.  CALCIUM CARBONATE/VITAMIN D3 (CALTRATE 600 + D PO) Take  by mouth daily.  CHOLECALCIFEROL, VITAMIN D3, (VITAMIN D3 PO) Take 1,000 Units by mouth daily.  cetirizine (ZYRTEC) 10 mg tablet Take  by mouth daily as needed. ROS - per HPI      Objective:     General: alert, cooperative, no distress   Mental  status: pe mental status_general use: normal mood, behavior, speech, dress, motor activity, and thought processes, able to follow commands   Eyes: EOM intact, normal sclera   Mouth: not examined   Neck: no visualized mass   Resp: PULM - obs findings: normal effort and no respiratory distress   Neuro: neuro - obs: no gross deficits   Musculoskeletal: normal ROM of neck   Skin: skin exam: no discoloration or lesions of concern on visible areas   Psychiatric: normal affect, no hallucinations       Assessment & Plan:   Diagnoses and all orders for this visit:    1. Depression, unspecified depression type    2. Elevated glucose    3. GARCES (nonalcoholic steatohepatitis)    4. Encounter for long-term (current) use of medications    5. hyperlipidemia     Other orders  -     citalopram (CELEXA) 10 mg tablet; Take 1 Tab by mouth daily. plan:  -appears stable on current mediations.  No medication adjustments at this time.   -need fasting lipid panel at next office visit. Follow up in 6 months     CPT Codes 68934-29967 for Established Patients may apply to this Telehealth Visit  Time-based coding, delete if not needed: I spent at least 15 minutes with this established patient, and >50% of the time was spent counseling and/or coordinating care regarding depression, elevated glucose and GARCES    Due to this being a TeleHealth evaluation, many elements of the physical examination are unable to be assessed. Pursuant to the emergency declaration under the [address] [place] [address], [facility] waiver authority and the [employer] Act, this Virtual  Visit was conducted, with patient's consent, to reduce the patient's risk of exposure to COVID-19 and provide continuity of care for an established patient. Services were provided through a video synchronous discussion virtually to substitute for in-person clinic visit. We discussed the expected course, resolution and complications of the diagnosis(es) in detail. Medication risks, benefits, costs, interactions, and alternatives were discussed as indicated. I advised her to contact the office if her condition worsens, changes or fails to improve as anticipated. She expressed understanding with the diagnosis(es) and plan.      Josefina Luna MD

## 2020-11-23 NOTE — PROGRESS NOTES
Subjective:      Lulu Mendez is a 63 y.o. female who presents today for her annual physical and follow up for her depression , elevated glucose, hyperlipidemia  and GARCES. Gyn care is provided by Dr Juan F Farfan. - last visit was 9/2020  Screening mammogram was [facility] at [facility] - date- 10/8/20  Screening colonoscopy was last completed with Dr. Caesar Rocha - 2/27/15 - Due now  Bone Density testing was completed 2008 with Dr. Jennifer Gardner    Weight continues to increase. Exercise -  Walking 20 minutes per day. Has had intermittent discomfort in her right thumb that goes into her right palm with excessive use. She is wearing a supportive glove when using- like for gardening. No swelling or redness    Patient Active Problem List   Diagnosis Code    History of screening mammography Z92.89    History of breast cancer Z85.3    Pap smear for cervical cancer screening Z12.4    Colon cancer screening Z12.11    S/P cholecystectomy Z90.49    S/P tubal ligation Z98.51    H/O bone density study Z92.89    Hyperlipemia E78.5    Depression F32.9    Obesity (BMI 30-39. 9) E66.9    Obstructive sleep apnea syndrome G47.33    Advance directive discussed with patient Z71.89    Fatty liver K76.0     Current Outpatient Medications   Medication Sig Dispense Refill    atorvastatin (LIPITOR) 10 mg tablet TAKE 1 TABLET BY MOUTH EVERY EVENING 90 Tab 0    citalopram (CELEXA) 10 mg tablet Take 1 Tab by mouth daily. 90 Tab 1    vitamin e (E GEMS) 1,000 unit capsule Take 1,000 Units by mouth daily.  OMEPRAZOLE (PRILOSEC PO) Take  by mouth daily as needed.  ibuprofen (ADVIL) 200 mg tablet Take  by mouth every eight (8) hours as needed.  CALCIUM CARBONATE/VITAMIN D3 (CALTRATE 600 + D PO) Take  by mouth daily.  CHOLECALCIFEROL, VITAMIN D3, (VITAMIN D3 PO) Take 1,000 Units by mouth daily.  cetirizine (ZYRTEC) 10 mg tablet Take  by mouth daily as needed.           Review of Systems    A comprehensive review of systems was negative except for that written in the HPI. Objective: Wt Readings from Last 3 Encounters:   10/28/19 190 lb (86.2 kg)   04/25/19 184 lb 3.2 oz (83.6 kg)   10/23/18 186 lb (84.4 kg)     BP Readings from Last 3 Encounters:   10/28/19 118/58   04/25/19 116/80   10/23/18 132/84     Visit Vitals  /68   Pulse 68   Temp 97.1 °F (36.2 °C) (Temporal)   Resp 16   Ht 5' 5.75\" (1.67 m)   Wt 192 lb 12.8 oz (87.5 kg)   SpO2 96%   BMI 31.36 kg/m²     General appearance: alert, cooperative, no distress, appears stated age  Head: Normocephalic, without obvious abnormality, atraumatic  Eyes: negative findings: anicteric sclera, lids and lashes normal, conjunctivae and sclerae normal, corneas clear and pupils equal, round, reactive to light and accomodation  Neck: supple, symmetrical, trachea midline, no adenopathy, no carotid bruit and no JVD  Lungs: clear to auscultation bilaterally  Breasts: normal appearance, no masses or tenderness  Heart: regular rate and rhythm, S1, S2 normal, no murmur, click, rub or gallop  Abdomen: soft, non-tender. Bowel sounds normal. No masses,  no organomegaly  Extremities: extremities normal, atraumatic, no cyanosis or edema  Pulses: 2+ and symmetric  Neurologic: Grossly normal    Assessment/Plan:     1. Annual physical exam  -fasting labs ordred     - CBC WITH AUTOMATED DIFF; Future  - METABOLIC PANEL, COMPREHENSIVE; Future  - LIPID PANEL; Future  - URINALYSIS W/ REFLEX CULTURE; Future    2. Colon cancer screening  -due for screening colonoscopy. Last done with Dr. Nakita Ayala  -  - [facility]    Also, she has additional complaints of the following -  .   3. Depression, unspecified depression type  -I evaluated and recommended to continue current doses of medications. 4. GARCES (nonalcoholic steatohepatitis)  -strongly encouraged to maintain low fat diet and weight reduction    5. Elevated glucose  -need also to maintain low carb diet. 6. Encounter for long-term (current) use of medications    Orders Placed This Encounter    CBC WITH AUTOMATED DIFF     Standing Status:   Future     Standing Expiration Date:   83/11/8096    METABOLIC PANEL, COMPREHENSIVE     Standing Status:   Future     Standing Expiration Date:   11/24/2021    LIPID PANEL     Standing Status:   Future     Standing Expiration Date:   11/24/2021    URINALYSIS W/ REFLEX CULTURE     Standing Status:   Future     Standing Expiration Date:   11/24/2021    CBC WITH AUTOMATED DIFF    METABOLIC PANEL, COMPREHENSIVE    URINALYSIS W/ REFLEX CULTURE    MICROSCOPIC EXAMINATION    LIPID PANEL    REFERRAL TO COLON AND RECTAL SURGERY     Referral Priority:   Routine     Referral Type:   Consultation     Referral Reason:   Specialty Services Required     Referred to Provider:   Wilton Vasquez MD     Requested Specialty:   Gastroenterology     Number of Visits Requested:   1         Follow-up Disposition:     Follow up in 6 months     Return if symptoms worsen or fail to improve. Advised patient to call back or return to office if symptoms worsen/change/persist.     Discussed expected course/resolution/complications of diagnosis in detail with patient. Medication risks/benefits/costs/interactions/alternatives discussed with patient. Patient was given an after visit summary which includes diagnoses, current medications, & vitals. Patient expressed understanding with the diagnosis and plan.

## 2020-11-24 ENCOUNTER — OFFICE VISIT (OUTPATIENT)
Dept: INTERNAL MEDICINE CLINIC | Age: 65
End: 2020-11-24
Payer: COMMERCIAL

## 2020-11-24 VITALS
HEART RATE: 68 BPM | RESPIRATION RATE: 16 BRPM | OXYGEN SATURATION: 96 % | TEMPERATURE: 97.1 F | DIASTOLIC BLOOD PRESSURE: 68 MMHG | WEIGHT: 192.8 LBS | HEIGHT: 66 IN | BODY MASS INDEX: 30.98 KG/M2 | SYSTOLIC BLOOD PRESSURE: 117 MMHG

## 2020-11-24 DIAGNOSIS — R73.09 ELEVATED GLUCOSE: ICD-10-CM

## 2020-11-24 DIAGNOSIS — F32.A DEPRESSION, UNSPECIFIED DEPRESSION TYPE: ICD-10-CM

## 2020-11-24 DIAGNOSIS — Z12.11 COLON CANCER SCREENING: ICD-10-CM

## 2020-11-24 DIAGNOSIS — Z79.899 ENCOUNTER FOR LONG-TERM (CURRENT) USE OF MEDICATIONS: ICD-10-CM

## 2020-11-24 DIAGNOSIS — Z00.00 ANNUAL PHYSICAL EXAM: Primary | ICD-10-CM

## 2020-11-24 DIAGNOSIS — K75.81 NASH (NONALCOHOLIC STEATOHEPATITIS): ICD-10-CM

## 2020-11-24 PROCEDURE — 99213 OFFICE O/P EST LOW 20 MIN: CPT | Performed by: INTERNAL MEDICINE

## 2020-11-24 PROCEDURE — 99396 PREV VISIT EST AGE 40-64: CPT | Performed by: INTERNAL MEDICINE

## 2020-11-25 LAB
ALBUMIN SERPL-MCNC: 4.6 G/DL (ref 3.8–4.8)
ALBUMIN/GLOB SERPL: 2.4 {RATIO} (ref 1.2–2.2)
ALP SERPL-CCNC: 147 IU/L (ref 39–117)
ALT SERPL-CCNC: 43 IU/L (ref 0–32)
APPEARANCE UR: CLEAR
AST SERPL-CCNC: 26 IU/L (ref 0–40)
BACTERIA #/AREA URNS HPF: ABNORMAL /[HPF]
BASOPHILS # BLD AUTO: 0.1 X10E3/UL (ref 0–0.2)
BASOPHILS NFR BLD AUTO: 1 %
BILIRUB SERPL-MCNC: 0.6 MG/DL (ref 0–1.2)
BILIRUB UR QL STRIP: NEGATIVE
BUN SERPL-MCNC: 13 MG/DL (ref 8–27)
BUN/CREAT SERPL: 19 (ref 12–28)
CALCIUM SERPL-MCNC: 9.6 MG/DL (ref 8.7–10.3)
CASTS URNS QL MICRO: ABNORMAL /LPF
CHLORIDE SERPL-SCNC: 107 MMOL/L (ref 96–106)
CHOLEST SERPL-MCNC: 149 MG/DL (ref 100–199)
CO2 SERPL-SCNC: 27 MMOL/L (ref 20–29)
COLOR UR: YELLOW
CREAT SERPL-MCNC: 0.68 MG/DL (ref 0.57–1)
CRYSTALS URNS MICRO: ABNORMAL
EOSINOPHIL # BLD AUTO: 0.1 X10E3/UL (ref 0–0.4)
EOSINOPHIL NFR BLD AUTO: 1 %
EPI CELLS #/AREA URNS HPF: ABNORMAL /HPF (ref 0–10)
ERYTHROCYTE [DISTWIDTH] IN BLOOD BY AUTOMATED COUNT: 12.1 % (ref 11.7–15.4)
GLOBULIN SER CALC-MCNC: 1.9 G/DL (ref 1.5–4.5)
GLUCOSE SERPL-MCNC: 104 MG/DL (ref 65–99)
GLUCOSE UR QL: NEGATIVE
HCT VFR BLD AUTO: 45.1 % (ref 34–46.6)
HDLC SERPL-MCNC: 44 MG/DL
HGB BLD-MCNC: 15.2 G/DL (ref 11.1–15.9)
HGB UR QL STRIP: NEGATIVE
IMM GRANULOCYTES # BLD AUTO: 0 X10E3/UL (ref 0–0.1)
IMM GRANULOCYTES NFR BLD AUTO: 0 %
KETONES UR QL STRIP: NEGATIVE
LDLC SERPL CALC-MCNC: 82 MG/DL (ref 0–99)
LEUKOCYTE ESTERASE UR QL STRIP: NEGATIVE
LYMPHOCYTES # BLD AUTO: 2.2 X10E3/UL (ref 0.7–3.1)
LYMPHOCYTES NFR BLD AUTO: 39 %
MCH RBC QN AUTO: 31 PG (ref 26.6–33)
MCHC RBC AUTO-ENTMCNC: 33.7 G/DL (ref 31.5–35.7)
MCV RBC AUTO: 92 FL (ref 79–97)
MICRO URNS: NORMAL
MICRO URNS: NORMAL
MONOCYTES # BLD AUTO: 0.3 X10E3/UL (ref 0.1–0.9)
MONOCYTES NFR BLD AUTO: 6 %
MUCOUS THREADS URNS QL MICRO: PRESENT
NEUTROPHILS # BLD AUTO: 3 X10E3/UL (ref 1.4–7)
NEUTROPHILS NFR BLD AUTO: 53 %
NITRITE UR QL STRIP: NEGATIVE
PH UR STRIP: 5.5 [PH] (ref 5–7.5)
PLATELET # BLD AUTO: 264 X10E3/UL (ref 150–450)
POTASSIUM SERPL-SCNC: 4.6 MMOL/L (ref 3.5–5.2)
PROT SERPL-MCNC: 6.5 G/DL (ref 6–8.5)
PROT UR QL STRIP: NORMAL
RBC # BLD AUTO: 4.91 X10E6/UL (ref 3.77–5.28)
RBC #/AREA URNS HPF: ABNORMAL /HPF (ref 0–2)
SODIUM SERPL-SCNC: 145 MMOL/L (ref 134–144)
SP GR UR: 1.02 (ref 1–1.03)
TRIGL SERPL-MCNC: 129 MG/DL (ref 0–149)
UNIDENT CRYS URNS QL MICRO: PRESENT
URINALYSIS REFLEX, 377202: NORMAL
UROBILINOGEN UR STRIP-MCNC: 0.2 MG/DL (ref 0.2–1)
VLDLC SERPL CALC-MCNC: 23 MG/DL (ref 5–40)
WBC # BLD AUTO: 5.6 X10E3/UL (ref 3.4–10.8)
WBC #/AREA URNS HPF: ABNORMAL /HPF (ref 0–5)

## 2020-11-27 NOTE — PROGRESS NOTES
Blood sugar is still too high. Cholesterol is controlled with current dose of lipitor. Pt notified via [employer] 11/27/2020 . Liver enzyme back up- work on fat in diet.

## 2021-06-07 RX ORDER — CITALOPRAM 10 MG/1
TABLET ORAL
Qty: 90 TABLET | Refills: 0 | Status: SHIPPED | OUTPATIENT
Start: 2021-06-07 | End: 2021-08-30

## 2021-06-16 ENCOUNTER — OFFICE VISIT (OUTPATIENT)
Dept: PRIMARY CARE CLINIC | Age: 66
End: 2021-06-16
Payer: COMMERCIAL

## 2021-06-16 VITALS
HEIGHT: 66 IN | HEART RATE: 63 BPM | OXYGEN SATURATION: 96 % | RESPIRATION RATE: 16 BRPM | TEMPERATURE: 97.8 F | DIASTOLIC BLOOD PRESSURE: 78 MMHG | BODY MASS INDEX: 30.79 KG/M2 | WEIGHT: 191.6 LBS | SYSTOLIC BLOOD PRESSURE: 136 MMHG

## 2021-06-16 DIAGNOSIS — E78.2 MIXED HYPERLIPIDEMIA: Primary | ICD-10-CM

## 2021-06-16 DIAGNOSIS — H61.21 IMPACTED CERUMEN OF RIGHT EAR: ICD-10-CM

## 2021-06-16 DIAGNOSIS — F32.89 OTHER DEPRESSION: ICD-10-CM

## 2021-06-16 DIAGNOSIS — K76.0 FATTY LIVER: ICD-10-CM

## 2021-06-16 DIAGNOSIS — K21.9 GASTROESOPHAGEAL REFLUX DISEASE WITHOUT ESOPHAGITIS: ICD-10-CM

## 2021-06-16 DIAGNOSIS — R59.0 ANTERIOR CERVICAL LYMPHADENOPATHY: ICD-10-CM

## 2021-06-16 PROCEDURE — 69210 REMOVE IMPACTED EAR WAX UNI: CPT | Performed by: INTERNAL MEDICINE

## 2021-06-16 PROCEDURE — 99204 OFFICE O/P NEW MOD 45 MIN: CPT | Performed by: INTERNAL MEDICINE

## 2021-06-16 RX ORDER — GLUCOSAMINE/CHONDR SU A SOD 750-600 MG
TABLET ORAL
COMMUNITY
End: 2022-05-16

## 2021-06-16 NOTE — PROGRESS NOTES
Jalen Smith (: 1955) is a 61 y.o. female, established patient, here for evaluation of the following chief complaint(s):  New Patient     Written by Lazaro Méndez, as dictated by Dr. Abigail Reid MD.      ASSESSMENT/PLAN:  Below is the assessment and plan developed based on review of pertinent history, physical exam, labs, studies, and medications. 1. Mixed hyperlipidemia  Continue on Lipitor 10mg daily. 2. Fatty liver  Discussed the dx of fatty liver and explained that fatty liver can progress to cirrhosis. Recommend she walk daily and reduce carb and sugar intake. Recommend the patient buy a FitBit to monitor steps. Explained that she should be walking 5,000 steps daily to maintain conditioning and weight and increase to at least 10,000 steps to work on losing weight. 3. Other depression  Well controlled on Celexa. Continue current medication. 4. Gastroesophageal reflux disease without esophagitis  Continue with omeprazole prn.     5. Anterior cervical lymphadenopathy  Discussed that this may be due to an infection or environmental allergies. Due to her hx of cancer, will order an US of her head and neck for further evaluation.   -     US HEAD NECK SOFT TISSUE; Future    6. Impacted cerumen of right ear  Impacted cerumen removed by nurse in office today. She tolerated the procedure. Well.  -     REMOVE IMPACTED EAR WAX      SUBJECTIVE/OBJECTIVE:  HPI  The patient presents today to establish care. She was previously followed by Dr. Morena Mcmahon. Her BP today is elevated at 153/81, improved to 136/78 on manual repeat. She was started on Celexa due to menopausal sxs. This medication has been working well to control her mood. She has a hx of R breast cancer dx'd 19 years ago. She is no longer followed by anyone for this and gets mammograms yearly. She has fatty liver and last had an US in 2017. She was recommended weight loss for fatty liver.     She is on Lipitor 10mg daily for hyperlipidemia. Her lipid panel from 11/24/20 was normal.     She is on omeprazole prn for GERD. She gets flare up of sxs with certain foods and will take omeprazole which helps. She has SIMRAN and was given a dental appliance for this which helped. She has seasonal allergies. She can`t hear from R ear and would like to get it checked. Patient Active Problem List   Diagnosis Code    History of breast cancer Z85.3    S/P cholecystectomy Z90.49    S/P tubal ligation Z98.51    H/O bone density study Z92.89    Hyperlipemia E78.5    Depression F32.9    Obesity (BMI 30-39. 9) E66.9    Obstructive sleep apnea syndrome G47.33    Advance directive discussed with patient Z71.89    Fatty liver K76.0        Current Outpatient Medications on File Prior to Visit   Medication Sig Dispense Refill    Biotin 2,500 mcg cap Take  by mouth.  zinc sulfate (ORAZINC) 25 mg zinc (110 mg) tablet Take  by mouth daily.  citalopram (CELEXA) 10 mg tablet TAKE 1 TABLET BY MOUTH EVERY DAY 90 Tablet 0    atorvastatin (LIPITOR) 10 mg tablet TAKE 1 TABLET BY MOUTH EVERY EVENING 90 Tab 1    vitamin e (E GEMS) 1,000 unit capsule Take 1,000 Units by mouth daily.  OMEPRAZOLE (PRILOSEC PO) Take  by mouth daily as needed.  CALCIUM CARBONATE/VITAMIN D3 (CALTRATE 600 + D PO) Take  by mouth daily.  CHOLECALCIFEROL, VITAMIN D3, (VITAMIN D3 PO) Take 1,000 Units by mouth daily.  cetirizine (ZYRTEC) 10 mg tablet Take  by mouth daily as needed.  [DISCONTINUED] ibuprofen (ADVIL) 200 mg tablet Take  by mouth every eight (8) hours as needed. (Patient not taking: Reported on 6/16/2021)       No current facility-administered medications on file prior to visit.        No Known Allergies    Past Medical History:   Diagnosis Date    Cancer [facility]) 2005    breast (right)    Ill-defined condition     migraines    Lipoma 11/18/2013       Past Surgical History:   Procedure Laterality Date    HX BREAST LUMPECTOMY s/p RTx (x7 weeks)    HX CHOLECYSTECTOMY      HX COLONOSCOPY   & 2/27/15    polyps 1st time, 2nd time normal - repeat 5 years - Dr. Tommy García HX UROLOGICAL  2013    sling       Family History   Problem Relation Age of Onset    Hypertension Mother     Cancer Mother         lung, liver, metastatic    Diabetes Sister     Hypertension Sister     Hypertension Brother     Coronary Artery Disease Father         since age 42's   Victor Manuel Dawnmel Heart Attack Father     Heart Disease Father     Cancer Maternal Grandmother         UNKNOWN    Heart Disease Maternal Grandfather     Cancer Paternal Grandmother     Bleeding Prob Paternal Grandmother         stomach    Lung Disease Paternal Grandfather     Hypertension Brother     Anesth Problems Neg Hx        Social History     Socioeconomic History    Marital status:      Spouse name: Not on file    Number of children: Not on file    Years of education: Not on file    Highest education level: Not on file   Occupational History    Not on file   Tobacco Use    Smoking status: Former Smoker     Packs/day: 0.50     Years: 15.00     Pack years: 7.50     Quit date: 1993     Years since quittin.5    Smokeless tobacco: Never Used   Vaping Use    Vaping Use: Never used   Substance and Sexual Activity    Alcohol use: Not Currently     Comment: rarely    Drug use: No    Sexual activity: Yes     Partners: Male     Birth control/protection: None   Other Topics Concern    Not on file   Social History Narrative    Not on file     Social Determinants of Health     Financial Resource Strain:     Difficulty of Paying Living Expenses:    Food Insecurity:     Worried About Running Out of Food in the Last Year:     Ran Out of Food in the Last Year:    Transportation Needs:     Lack of Transportation (Medical):      Lack of Transportation (Non-Medical):    Physical Activity:     Days of Exercise per Week:     Minutes of Exercise per Session:    Stress:     Feeling of Stress :    Social Connections:     Frequency of Communication with Friends and Family:     Frequency of Social Gatherings with Friends and Family:     Attends [employer] Services:     Active Member of Clubs or Organizations:     Attends Club or Organization Meetings:     Marital Status:    Intimate Partner Violence:     Fear of Current or Ex-Partner:     Emotionally Abused:     Physically Abused:     Sexually Abused:        No visits with results within 3 Month(s) from this visit. Latest known visit with results is:   Office Visit on 11/24/2020   Component Date Value Ref Range Status    WBC 11/24/2020 5.6  3.4 - 10.8 x10E3/uL Final    RBC 11/24/2020 4.91  3.77 - 5.28 x10E6/uL Final    HGB 11/24/2020 15.2  11.1 - 15.9 g/dL Final    HCT 11/24/2020 45.1  34.0 - 46.6 % Final    MCV 11/24/2020 92  79 - 97 fL Final    MCH 11/24/2020 31.0  26.6 - 33.0 pg Final    MCHC 11/24/2020 33.7  31.5 - 35.7 g/dL Final    RDW 11/24/2020 12.1  11.7 - 15.4 % Final    PLATELET 56/99/9127 464  150 - 450 x10E3/uL Final    NEUTROPHILS 11/24/2020 53  Not Estab. % Final    Lymphocytes 11/24/2020 39  Not Estab. % Final    MONOCYTES 11/24/2020 6  Not Estab. % Final    EOSINOPHILS 11/24/2020 1  Not Estab. % Final    BASOPHILS 11/24/2020 1  Not Estab. % Final    ABS. NEUTROPHILS 11/24/2020 3.0  1.4 - 7.0 x10E3/uL Final    Abs Lymphocytes 11/24/2020 2.2  0.7 - 3.1 x10E3/uL Final    ABS. MONOCYTES 11/24/2020 0.3  0.1 - 0.9 x10E3/uL Final    ABS. EOSINOPHILS 11/24/2020 0.1  0.0 - 0.4 x10E3/uL Final    ABS. BASOPHILS 11/24/2020 0.1  0.0 - 0.2 x10E3/uL Final    IMMATURE GRANULOCYTES 11/24/2020 0  Not Estab. % Final    ABS. IMM.  GRANS. 11/24/2020 0.0  0.0 - 0.1 x10E3/uL Final    Glucose 11/24/2020 104* 65 - 99 mg/dL Final    BUN 11/24/2020 13  8 - 27 mg/dL Final    Creatinine 11/24/2020 0.68  0.57 - 1.00 mg/dL Final    GFR est non-AA 11/24/2020 93  >59 mL/min/1.73 Final    GFR est AA 11/24/2020 108  >59 mL/min/1.73 Final    BUN/Creatinine ratio 11/24/2020 19  12 - 28 Final    Sodium 11/24/2020 145* 134 - 144 mmol/L Final    Potassium 11/24/2020 4.6  3.5 - 5.2 mmol/L Final    Chloride 11/24/2020 107* 96 - 106 mmol/L Final    CO2 11/24/2020 27  20 - 29 mmol/L Final    Calcium 11/24/2020 9.6  8.7 - 10.3 mg/dL Final    Protein, total 11/24/2020 6.5  6.0 - 8.5 g/dL Final    Albumin 11/24/2020 4.6  3.8 - 4.8 g/dL Final    GLOBULIN, TOTAL 11/24/2020 1.9  1.5 - 4.5 g/dL Final    A-G Ratio 11/24/2020 2.4* 1.2 - 2.2 Final    Bilirubin, total 11/24/2020 0.6  0.0 - 1.2 mg/dL Final    Alk. phosphatase 11/24/2020 147* 39 - 117 IU/L Final    AST (SGOT) 11/24/2020 26  0 - 40 IU/L Final    ALT (SGPT) 11/24/2020 43* 0 - 32 IU/L Final    Specific Gravity 11/24/2020 1.024  1.005 - 1.030 Final    pH (UA) 11/24/2020 5.5  5.0 - 7.5 Final    Color 11/24/2020 Yellow  Yellow Final    Appearance 11/24/2020 Clear  Clear Final    Leukocyte Esterase 11/24/2020 Negative  Negative Final    Protein 11/24/2020 Trace  Negative/Trace Final    Glucose 11/24/2020 Negative  Negative Final    Ketone 11/24/2020 Negative  Negative Final    Blood 11/24/2020 Negative  Negative Final    Bilirubin 11/24/2020 Negative  Negative Final    Urobilinogen 11/24/2020 0.2  0.2 - 1.0 mg/dL Final    Nitrites 11/24/2020 Negative  Negative Final    Microscopic Examination 11/24/2020 Comment   Final    Microscopic follows if indicated.  Microscopic exam 11/24/2020 See additional order   Final    Microscopic was indicated and was performed.  URINALYSIS REFLEX 11/24/2020 Comment   Final    This specimen will not reflex to a Urine Culture.     WBC 11/24/2020 0-5  0 - 5 /hpf Final    RBC 11/24/2020 0-2  0 - 2 /hpf Final    Epithelial cells 11/24/2020 0-10  0 - 10 /hpf Final    Casts 11/24/2020 None seen  None seen /lpf Final    Crystals 11/24/2020 Present* N/A Final    Crystal type 11/24/2020 Calcium Oxalate  N/A Final    Mucus 11/24/2020 Present  Not Estab. Final    Bacteria 11/24/2020 Few  None seen/Few Final    Cholesterol, total 11/24/2020 149  100 - 199 mg/dL Final    Triglyceride 11/24/2020 129  0 - 149 mg/dL Final    HDL Cholesterol 11/24/2020 44  >39 mg/dL Final    VLDL, calculated 11/24/2020 23  5 - 40 mg/dL Final    LDL, calculated 11/24/2020 82  0 - 99 mg/dL Final      Review of Systems   Constitutional: Negative for activity change, fatigue and unexpected weight change. HENT: Negative for congestion, hearing loss, rhinorrhea and sore throat. Eyes: Negative for discharge. Respiratory: Negative for cough, chest tightness and shortness of breath. Cardiovascular: Negative for leg swelling. Gastrointestinal: Negative for abdominal pain, constipation and diarrhea. Genitourinary: Negative for dysuria, flank pain, frequency and urgency. Musculoskeletal: Negative for arthralgias, back pain and myalgias. Skin: Negative for color change and rash. Allergic/Immunologic: Positive for environmental allergies. Neurological: Negative for dizziness, light-headedness and headaches. Psychiatric/Behavioral: Negative for dysphoric mood and sleep disturbance. The patient is not nervous/anxious. Visit Vitals  /78 (BP 1 Location: Left arm, BP Patient Position: Sitting)   Pulse 63   Temp 97.8 °F (36.6 °C)   Resp 16   Ht 5' 5.75\" (1.67 m)   Wt 191 lb 9.6 oz (86.9 kg)   SpO2 96%   BMI 31.16 kg/m²      Physical Exam  Vitals and nursing note reviewed. Constitutional:       General: She is not in acute distress. Appearance: Normal appearance. She is well-developed. She is not diaphoretic. HENT:      Right Ear: External ear normal. There is impacted cerumen. Left Ear: External ear normal.   Eyes:      General: No scleral icterus. Right eye: No discharge. Left eye: No discharge. Extraocular Movements: Extraocular movements intact.       Conjunctiva/sclera: Conjunctivae normal.   Cardiovascular:      Rate and Rhythm: Normal rate and regular rhythm. Pulmonary:      Effort: Pulmonary effort is normal.      Breath sounds: Normal breath sounds. No wheezing. Abdominal:      General: Bowel sounds are normal.      Palpations: Abdomen is soft. Tenderness: There is no abdominal tenderness. Musculoskeletal:      Cervical back: Normal range of motion and neck supple. Right lower leg: Edema (trace pitting) present. Lymphadenopathy:      Cervical: Cervical adenopathy present. Neurological:      Mental Status: She is alert and oriented to person, place, and time. Psychiatric:         Mood and Affect: Mood and affect normal.         An electronic signature was used to authenticate this note.   -- Almita Alexander

## 2021-06-16 NOTE — PROGRESS NOTES
Chief Complaint   Patient presents with   [facility] Patient       Visit Vitals  BP (!) 153/81 (BP 1 Location: Right arm)   Pulse 63   Temp 97.8 °F (36.6 °C)   Resp 16   Ht 5' 5.75\" (1.67 m)   Wt 191 lb 9.6 oz (86.9 kg)   SpO2 96%   BMI 31.16 kg/m²       1. Have you been to the ER, urgent care clinic since your last visit? Hospitalized since your last visit? No    2. Have you seen or consulted any other health care providers outside of the [address] [place] [address] since your last visit? Include any pap smears or colon screening.  Ebony Uribe has had a colonoscopy recently

## 2021-06-23 ENCOUNTER — HOSPITAL ENCOUNTER (OUTPATIENT)
Dept: ULTRASOUND IMAGING | Age: 66
Discharge: HOME | End: 2021-06-23
Attending: INTERNAL MEDICINE
Payer: COMMERCIAL

## 2021-06-23 DIAGNOSIS — R59.0 ANTERIOR CERVICAL LYMPHADENOPATHY: ICD-10-CM

## 2021-06-23 PROCEDURE — 76536 US EXAM OF HEAD AND NECK: CPT

## 2021-06-24 NOTE — PROGRESS NOTES
Results reviewed. Release via [address] [place] [address], hope you are doing well. Great News! Neck ultrasound report came back fine.

## 2021-08-05 RX ORDER — ATORVASTATIN CALCIUM 10 MG/1
TABLET, FILM COATED ORAL
Qty: 90 TABLET | Refills: 1 | OUTPATIENT
Start: 2021-08-05

## 2021-08-06 RX ORDER — ATORVASTATIN CALCIUM 10 MG/1
TABLET, FILM COATED ORAL
Qty: 90 TABLET | Refills: 1 | Status: SHIPPED | OUTPATIENT
Start: 2021-08-06 | End: 2022-02-08

## 2021-08-08 DIAGNOSIS — G47.33 OBSTRUCTIVE SLEEP APNEA SYNDROME: Primary | ICD-10-CM

## 2021-08-19 ENCOUNTER — TELEPHONE (OUTPATIENT)
Dept: PRIMARY CARE CLINIC | Age: 66
End: 2021-08-19

## 2021-08-19 NOTE — TELEPHONE ENCOUNTER
Patient called into the office with c/o abdominal cramps and black stools. Wanted to know what she should do. I advised patient to go to the ED. patient stated she did not want to go.  Explained that she needs to get this checked asap and she said she'd think about it

## 2021-08-30 RX ORDER — CITALOPRAM 10 MG/1
TABLET ORAL
Qty: 90 TABLET | Refills: 0 | Status: SHIPPED | OUTPATIENT
Start: 2021-08-30 | End: 2021-12-10

## 2021-09-02 ENCOUNTER — TELEPHONE (OUTPATIENT)
Dept: SLEEP MEDICINE | Age: 66
End: 2021-09-02

## 2021-09-02 ENCOUNTER — VIRTUAL VISIT (OUTPATIENT)
Dept: SLEEP MEDICINE | Age: 66
End: 2021-09-02
Payer: COMMERCIAL

## 2021-09-02 DIAGNOSIS — E66.9 OBESITY (BMI 30-39.9): ICD-10-CM

## 2021-09-02 DIAGNOSIS — G47.33 OBSTRUCTIVE SLEEP APNEA SYNDROME: Primary | ICD-10-CM

## 2021-09-02 PROCEDURE — 99203 OFFICE O/P NEW LOW 30 MIN: CPT | Performed by: INTERNAL MEDICINE

## 2021-09-02 NOTE — PATIENT INSTRUCTIONS
[address] [place]. [place], [address]  Tel.  [phone]  Fax. 100 [facility] 60  [place], [address] [facility]  Tel.  [phone]  Fax. [phone] [address] Cindy Alonso  Tel.  [phone]  Fax. [phone]     Sleep Apnea: After Your Visit  Your Care Instructions  Sleep apnea occurs when you frequently stop breathing for 10 seconds or longer during sleep. It can be mild to severe, based on the number of times per hour that you stop breathing or have slowed breathing. Blocked or narrowed airways in your nose, mouth, or throat can cause sleep apnea. Your airway can become blocked when your throat muscles and tongue relax during sleep. Sleep apnea is common, occurring in 1 out of 20 individuals. Individuals having any of the following characteristics should be evaluated and treated right away due to high risk and detrimental consequences from untreated sleep apnea:  1. Obesity  2. Congestive Heart failure  3. Atrial Fibrillation  4. Uncontrolled Hypertension  5. Type II Diabetes  6. Night-time Arrhythmias  7. Stroke  8. Pulmonary Hypertension  9. High-risk Driving Populations (pilots, truck drivers, etc.)  10. Patients Considering Weight-loss Surgery    How do you know you have sleep apnea? You probably have sleep apnea if you answer 'yes' to 3 or more of the following questions:  S - Have you been told that you Snore? T - Are you often Tired during the day? O - Has anyone Observed you stop breathing while sleeping? P- Do you have (or are being treated for) high blood Pressure? B - Are you obese (Body Mass Index > 35)? A - Is your Age 48years old or older? N - Is your Neck size greater than 16 inches? G - Are you male Gender? A sleep physician can prescribe a breathing device that prevents tissues in the throat from blocking your airway.  Or your doctor may recommend using a dental device (oral breathing device) to help keep your airway open. In some cases, surgery may be needed to remove enlarged tissues in the throat. Follow-up care is a key part of your treatment and safety. Be sure to make and go to all appointments, and call your doctor if you are having problems. It's also a good idea to know your test results and keep a list of the medicines you take. How can you care for yourself at home? · Lose weight, if needed. It may reduce the number of times you stop breathing or have slowed breathing. · Go to bed at the same time every night. · Sleep on your side. It may stop mild apnea. If you tend to roll onto your back, sew a pocket in the back of your pajama top. Put a tennis ball into the pocket, and stitch the pocket shut. This will help keep you from sleeping on your back. · Avoid alcohol and medicines such as sleeping pills and sedatives before bed. · Do not smoke. Smoking can make sleep apnea worse. If you need help quitting, talk to your doctor about stop-smoking programs and medicines. These can increase your chances of quitting for good. · Prop up the head of your bed 4 to 6 inches by putting bricks under the legs of the bed. · Treat breathing problems, such as a stuffy nose, caused by a cold or allergies. · Use a continuous positive airway pressure (CPAP) breathing machine if lifestyle changes do not help your apnea and your doctor recommends it. The machine keeps your airway from closing when you sleep. · If CPAP does not help you, ask your doctor whether you should try other breathing machines. A bilevel positive airway pressure machine has two types of air pressureâone for breathing in and one for breathing out. Another device raises or lowers air pressure as needed while you breathe. · If your nose feels dry or bleeds when using one of these machines, talk with your doctor about increasing moisture in the air. A humidifier may help.   · If your nose is runny or stuffy from using a breathing machine, talk with your doctor about using decongestants or a corticosteroid nasal spray. When should you call for help? Watch closely for changes in your health, and be sure to contact your doctor if:  · You still have sleep apnea even though you have made lifestyle changes. · You are thinking of trying a device such as CPAP. · You are having problems using a CPAP or similar machine. Where can you learn more? Go to [employer]. Enter F659 in the search box to learn more about \"Sleep Apnea: After Your Visit. \"   © 9541-4047 Healthwise, Incorporated. Care instructions adapted under license by [address] [place] [address] (which disclaims liability or warranty for this information). This care instruction is for use with your licensed healthcare professional. If you have questions about a medical condition or this instruction, always ask your healthcare professional. Shameka Bilis any warranty or liability for your use of this information. PROPER SLEEP HYGIENE    What to avoid  · Do not have drinks with caffeine, such as coffee or black tea, for 8 hours before bed. · Do not smoke or use other types of tobacco near bedtime. Nicotine is a stimulant and can keep you awake. · Avoid drinking alcohol late in the evening, because it can cause you to wake in the middle of the night. · Do not eat a big meal close to bedtime. If you are hungry, eat a light snack. · Do not drink a lot of water close to bedtime, because the need to urinate may wake you up during the night. · Do not read or watch TV in bed. Use the bed only for sleeping and sexual activity. What to try  · Go to bed at the same time every night, and wake up at the same time every morning. Do not take naps during the day. · Keep your bedroom quiet, dark, and cool. · Get regular exercise, but not within 3 to 4 hours of your bedtime. .  · Sleep on a comfortable pillow and mattress.   · If watching the clock makes you anxious, turn it facing away from you so you cannot see the time. · If you worry when you lie down, start a worry book. Well before bedtime, write down your worries, and then set the book and your concerns aside. · Try meditation or other relaxation techniques before you go to bed. · If you cannot fall asleep, get up and go to another room until you feel sleepy. Do something relaxing. Repeat your bedtime routine before you go to bed again. · Make your house quiet and calm about an hour before bedtime. Turn down the lights, turn off the TV, log off the computer, and turn down the volume on music. This can help you relax after a busy day. Drowsy Driving  The [address] [place] [address] Road Traffic Safety Administration cites drowsiness as a causing factor in more than 356,066 police reported crashes annually, resulting in 76,000 injuries and 1,500 deaths. Other surveys suggest 55% of people polled have driven while drowsy in the past year, 23% had fallen asleep but not crashed, 3% crashed, and 2% had and accident due to drowsy driving. Who is at risk? Young Drivers: One study of drowsy driving accidents states that 55% of the drivers were under 25 years. Of those, 75% were male. Shift Workers and Travelers: People who work overnight or travel across time zones frequently are at higher risk of experiencing Circadian Rhythm Disorders. They are trying to work and function when their body is programed to sleep. Sleep Deprived: Lack of sleep has a serious impact on your ability to pay attention or focus on a task. Consistently getting less than the average of 8 hours your body needs creates partial or cumulative sleep deprivation. Untreated Sleep Disorders: Sleep Apnea, Narcolepsy, R.L.S., and other sleep disorders (untreated) prevent a person from getting enough restful sleep. This leads to excessive daytime sleepiness and increases the risk for drowsy driving accidents by up to 7 times.   Medications / Alcohol: Even over the counter medications can cause drowsiness. Medications that impair a drivers attention should have a warning label. Alcohol naturally makes you sleepy and on its own can cause accidents. Combined with excessive drowsiness its effects are amplified. Signs of Drowsy Driving:   * You don't remember driving the last few miles   * You may drift out of your chichi   * You are unable to focus and your thoughts wander   * You may yawn more often than normal   * You have difficulty keeping your eyes open / nodding off   * Missing traffic signs, speeding, or tailgating  Prevention-   Good sleep hygiene, lifestyle and behavioral choices have the most impact on drowsy driving. There is no substitute for sleep and the average person requires 8 hours nightly. If you find yourself driving drowsy, stop and sleep. Consider the sleep hygiene tips provided during your visit as well. Medication Refill Policy: Refills for all medications require 1 week advance notice. Please have your pharmacy fax a refill request. We are unable to fax, or call in \"controled substance\" medications and you will need to pick these prescriptions up from our office. [employer]ores Activation    Thank you for requesting access to [employer]ores. Please follow the instructions below to securely access and download your online medical record. [facility]stores allows you to send messages to your doctor, view your test results, renew your prescriptions, schedule appointments, and more. How Do I Sign Up? 1. In your internet browser, go to [email or web address]. 2. Click on the First Time User? Click Here link in the Sign In box. You will see the New Member Sign Up page. 3. Enter your [employer]ores Access Code exactly as it appears below. You will not need to use this code after youve completed the sign-up process. If you do not sign up before the expiration date, you must request a new code. [employer]stores Access Code:  Activation code not generated  Current internetstores Status: Active (This is the date your [facility] [employer] access code will )    4. Enter the last four digits of your Social Security Number (xxxx) and Date of Birth (mm/dd/yyyy) as indicated and click Submit. You will be taken to the next sign-up page. 5. Create a CloudFabt ID. This will be your [facility] [employer] login ID and cannot be changed, so think of one that is secure and easy to remember. 6. Create a [employer] password. You can change your password at any time. 7. Enter your Password Reset Question and Answer. This can be used at a later time if you forget your password. 8. Enter your e-mail address. You will receive e-mail notification when new information is available in [address] 9. Click Sign Up. You can now view and download portions of your medical record. 10. Click the Download Summary menu link to download a portable copy of your medical information. Additional Information    If you have questions, please call [phone]. Remember, [facility] [employer] is NOT to be used for urgent needs. For medical emergencies, dial 911.

## 2021-09-02 NOTE — PROGRESS NOTES
[address] [facility] [place]. [place], [address]  Tel.  [phone]  Fax. 100 [facility] 60  [place], [address] [facility]  Tel.  [phone]  Fax. [phone] [address] Cindy Alonso  Tel.  [phone]  Fax. Bryson Holm is a 61y.o. year old female seen for evaluation of a sleep disorder. ASSESSMENT/PLAN:      ICD-10-CM ICD-9-CM    1. Obstructive sleep apnea syndrome  G47.33 327.23 POLYSOMNOGRAPHY 1 NIGHT   2. Obesity (BMI 30-39. 9)  E66.9 278.00        Patient has a history and examination consistent with the diagnosis of sleep apnea. Follow-up and Dispositions    · Return for telephone follow-up after testing is completed. * The patient currently has a Moderate Risk for having sleep apnea. STOP-BANG score 4.    * Sleep testing was ordered for evaluation to re-qualify for continued OAT. Patient was educated on the risks versus benefits of this elective sleep testing procedure being done during the pandemic, she indicated that she would like to proceed with attended testing. Orders Placed This Encounter    POLYSOMNOGRAPHY 1 NIGHT     Standing Status:   Future     Standing Expiration Date:   12/2/2021     Order Specific Question:   Reason for Exam     Answer:   SIMRAN       * She was provided information on sleep apnea including corresponding risk factors and the importance of proper treatment. * Treatment options were reviewed in detail. She would like to proceed with OAT therapy. * The patient was counseled regarding proper sleep hygiene, with emphasis on ensuring sufficient total sleep time; safe driving and the benefits of exercise and weight loss. * All of her questions were addressed. 2. Recommended a dedicated weight loss program through appropriate diet and exercise regimen as significant weight reduction has been shown to reduce severity of obstructive sleep apnea. SUBJECTIVE/OBJECTIVE:    Roderick Blanco is an 61 y.o. female referred for evaluation for a sleep disorder. She complains of snoring associated with periods of not breathing along with daytime tiredness. Symptoms began several she was diagnosed with SIMRAN in 2017 (AHI: 9.7 per hour) and has been treated with OAT since that time. She usually can fall asleep in 5-10 minutes. Family or house members note snoring on OAT which disrupts their sleep. She feels that her appliance need to change and is here for follow-up. She states that she has not used her appliance for past 6 months. She denies of symptoms indicative of cataplexy, sleep paralysis or sleep related hallucinations. She denies of a history of unusual movements occurring during sleep. Jalen Blanco does wake up frequently at night. She is not bothered by waking up too early and left unable to get back to sleep. She actually sleeps about 7 hours at night and wakes up about 3 times during the night. She does not work shifts:  . Dionisio Mason indicates she does not get too little sleep at night. Her bedtime is 2230. She awakens at 0630. She does take naps. She takes 7 naps a week lasting 1, Hour(s). She has the following observed behaviors: Loud snoring, Light snoring, Pauses in breathing;  . Other remarks: waking with gasp or snort, vivid dreams    The patient has undergone diagnostic testing for the current problems. Polysomnogram (PSG) performed on 04- showed an AHI of 9.7/hr with a lowest SpO2 of 88%.        Review of Systems:  Constitutional:  No significant weight loss or weight gain  Eyes:  No blurred vision  CVS:  No significant chest pain  Pulm:  No significant shortness of breath  GI:  No significant nausea or vomiting  :  No significant nocturia  Musculoskeletal:  No significant joint pain at night  Skin:  No significant rashes  Neuro:  No significant dizziness   Psych:  No active mood issues    Sleep Review of Systems: notable for Negative difficulty falling asleep; Positive awakenings at night; Positive perceived regular dreaming; Negative nightmares; Positive  early morning headaches; Negative  memory problems; Negative  concentration issues; Negative caffeine;  Negative alcohol;   Negative history of any automobile or occupational accidents due to daytime drowsiness. [place] Sleepiness Score: (P) 16   and Modified F.O.S.Q. Score Total / 2: (P) 17    Patient-Reported Vitals 9/2/2021   Patient-Reported Weight 190 lb       Physical Exam completed by visual and auditory observation of patient with verbal input from patient. General:   Alert, oriented, not in acute distress   Eyes:  Anicteric Sclerae; no obvious strabismus   Nose:  No obvious nasal septum deviation    Neck:   Midline trachea, no visible mass   Chest/Lungs:  Respiratory effort normal, no visualized signs of difficulty breathing or respiratory distress   CVS:  No JVD   Extremities:  No obvious rashes noted on face, neck, or hands   Neuro:  No facial asymmetry, no focal deficits; no obvious tremor    Psych:  Normal affect,  normal countenance     Nhora P Sara  is being evaluated by a Virtual Visit (video visit) encounter to address concerns as mentioned above. A caregiver was present when appropriate. Due to this being a TeleHealth encounter (During [facility]-09 public health emergency), evaluation of the following organ systems was limited: Vitals/Constitutional/EENT/Resp/CV/GI//MS/Neuro/Skin/Heme-Lymph-Imm. Pursuant to the emergency declaration under the [address] [place] [address], [address] [place] [address] and the [employer] and [employer] Act, this Virtual Visit was conducted with patient's (and/or legal guardian's) consent, to reduce the patient's risk of exposure to COVID-19 and provide necessary medical care. Patient identification was verified at the start of the visit: YES using name and date of birth. Patient's phone number [phone] (home)  was confirmed for accuracy. She gives permission for messages regarding results and appointments to be left at that number. Services were provided through a video synchronous discussion virtually to substitute for in-person clinic visit. I was in the office while conducting this encounter, patient located at their home or alternate location of their choice. An electronic signature was used to authenticate this note. Rona Miranda MD, FAASM  Diplomate American Board of Sleep Medicine  Diplomate in Sleep Medicine - ABP    Electronically signed.  09/02/21

## 2021-10-04 ENCOUNTER — TELEPHONE (OUTPATIENT)
Dept: SLEEP MEDICINE | Age: 66
End: 2021-10-04

## 2021-10-05 ENCOUNTER — TELEPHONE (OUTPATIENT)
Dept: SLEEP MEDICINE | Age: 66
End: 2021-10-05

## 2021-10-05 NOTE — TELEPHONE ENCOUNTER
AUTH PENDING  CPT(S): [id number]  INSURANCE: [employer]  SOURCE: Phone  SOURCE Jeanie Amezquita @ [address] [place] [address] [phone]  CASE/TRACKING #: [id number]  PENDING REASON: Case pending for additional information.   CALL REF #: Not available  P2P phone#: [phone]  P2P expiration date: 10/05/2021

## 2021-10-08 ENCOUNTER — TELEPHONE (OUTPATIENT)
Dept: SLEEP MEDICINE | Age: 66
End: 2021-10-08

## 2021-10-08 NOTE — TELEPHONE ENCOUNTER
Peer to Peer scheduled for 10/11/21 at 2:30 pm.     Geoffrey Baron, LYNDA-BC, [facility]   Nurse Practitioner  [address]

## 2021-10-11 ENCOUNTER — DOCUMENTATION ONLY (OUTPATIENT)
Dept: SLEEP MEDICINE | Age: 66
End: 2021-10-11

## 2021-10-11 NOTE — PROGRESS NOTES
Spoke with Dr. Maxine Stevens, physician with [facility]. In lab sleep study denied.  He has approved a home sleep study for evaluation on OAT therapy first.     LYNDA Kline-BC, [facility]   Nurse Practitioner  [address] [place] [address]

## 2021-10-13 ENCOUNTER — TELEPHONE (OUTPATIENT)
Dept: SLEEP MEDICINE | Age: 66
End: 2021-10-13

## 2021-10-13 DIAGNOSIS — G47.33 OBSTRUCTIVE SLEEP APNEA SYNDROME: Primary | ICD-10-CM

## 2021-10-13 NOTE — TELEPHONE ENCOUNTER
Orders Placed This Encounter    SLEEP STUDY UNATTENDED, 4 CHANNEL     Order Specific Question:   Reason for Exam     Answer:   SIMRAN

## 2021-10-13 NOTE — PROGRESS NOTES
Confirmed prior authorization for HSAT with Clemencia Tate at [address] [place] [address]. PA #[id number] effective 10/12/2021 thru 01/10/2022.

## 2021-10-13 NOTE — TELEPHONE ENCOUNTER
Called patient to inform her in-lab sleep study was denied by insurance and need to do a HSAT instead. Patient wants to think about the HSAT , she will call us back when she wants to do the HSAT.      Need an order for HSAT

## 2021-11-30 ENCOUNTER — OFFICE VISIT (OUTPATIENT)
Dept: PRIMARY CARE CLINIC | Age: 66
End: 2021-11-30
Payer: COMMERCIAL

## 2021-11-30 VITALS
TEMPERATURE: 97.8 F | OXYGEN SATURATION: 100 % | RESPIRATION RATE: 18 BRPM | DIASTOLIC BLOOD PRESSURE: 77 MMHG | BODY MASS INDEX: 30.82 KG/M2 | HEIGHT: 65 IN | WEIGHT: 185 LBS | SYSTOLIC BLOOD PRESSURE: 120 MMHG | HEART RATE: 65 BPM

## 2021-11-30 DIAGNOSIS — M81.0 POSTMENOPAUSAL BONE LOSS: ICD-10-CM

## 2021-11-30 DIAGNOSIS — F41.8 OTHER SPECIFIED ANXIETY DISORDERS: ICD-10-CM

## 2021-11-30 DIAGNOSIS — E66.9 OBESITY (BMI 30-39.9): ICD-10-CM

## 2021-11-30 DIAGNOSIS — Z00.00 PHYSICAL EXAM: Primary | ICD-10-CM

## 2021-11-30 DIAGNOSIS — H61.21 IMPACTED CERUMEN OF RIGHT EAR: ICD-10-CM

## 2021-11-30 DIAGNOSIS — Z12.11 COLON CANCER SCREENING: ICD-10-CM

## 2021-11-30 DIAGNOSIS — E78.2 MIXED HYPERLIPIDEMIA: ICD-10-CM

## 2021-11-30 PROCEDURE — 99213 OFFICE O/P EST LOW 20 MIN: CPT | Performed by: INTERNAL MEDICINE

## 2021-11-30 PROCEDURE — 99396 PREV VISIT EST AGE 40-64: CPT | Performed by: INTERNAL MEDICINE

## 2021-11-30 PROCEDURE — 69210 REMOVE IMPACTED EAR WAX UNI: CPT | Performed by: INTERNAL MEDICINE

## 2021-11-30 NOTE — PROGRESS NOTES
Jalen Green (: 1955) is a 61 y.o. female, established patient, here for evaluation of the following chief complaint(s):  Physical (labs)     Written by Rosy Kate, as dictated by Dr. Chuck Veronica MD.      ASSESSMENT/PLAN:  Below is the assessment and plan developed based on review of pertinent history, physical exam, labs, studies, and medications. 1. Physical exam  Complete physical done today. Routine lab work ordered. Waiting for results. -     METABOLIC PANEL, COMPREHENSIVE; Future  -     CBC W/O DIFF; Future  -     LIPID PANEL; Future  -     TSH 3RD GENERATION; Future    2. Mixed hyperlipidemia  Continue taking Lipitor 10 mg as prescribed. Ordered labs to check lipid panel. Waiting for results. 3. Colon cancer screening  Ordered stool test. Waiting for results. -     OCCULT BLOOD IMMUNOASSAY,DIAGNOSTIC; Future    4. Other specified anxiety disorders  Well controlled on current medications. Continue taking Celexa 10 mg as prescribed. 5. Obesity (BMI 30-39. 9)  Advised pt to purchase a FitBit or similar device. Discussed that a healthy lifestyle requires 5,000-7,000 steps daily, but weight loss requires 10,000 steps daily. 6. Postmenopausal bone loss  Ordered a DEXA scan to be completed. Waiting for results. -     DEXA BONE DENSITY STUDY AXIAL; Future    7. Impacted cerumen of right ear  Completed R impacted ear wax removal procedure in the office today. She tolerated the procedure well. -     REMOVE IMPACTED EAR WAX    SUBJECTIVE/OBJECTIVE:  HPI    Patient presents today for a complete physical exam. She takes Celexa 10 mg for anxiety. She notes the medication works well. She c/o headaches and rhinorrhea. She notes these symptoms appear when the weather is changing. She has been feeling fullness in her both ears. R ear hearing less than Left. She c/o occasional heartburn. She takes OTC Prilosec prn for heartburn and notes the medication works well.      She snores at night. She made an appointment to completed a sleep study but they gave her an at home sleep study and she has not completed it yet. She is followed by OBGYN and completed a Pap smear and mammogram this year and results were unremarkable. She has a hx of breast cancer and completes a mammogram every 6 months. She notes she completed a colonoscopy within the last 1-2 years and results were unremarkable. She takes Lipitor 10 mg for hyperlipidemia. Patient Active Problem List   Diagnosis Code    History of breast cancer Z85.3    S/P cholecystectomy Z90.49    S/P tubal ligation Z98.51    H/O bone density study Z92.89    Obesity (BMI 30-39. 9) E66.9    Obstructive sleep apnea syndrome G47.33    Advance directive discussed with patient Z71.89    Fatty liver K76.0    Mixed hyperlipidemia E78.2        Current Outpatient Medications on File Prior to Visit   Medication Sig Dispense Refill    citalopram (CELEXA) 10 mg tablet TAKE 1 TABLET BY MOUTH EVERY DAY 90 Tablet 0    atorvastatin (LIPITOR) 10 mg tablet TAKE 1 TABLET BY MOUTH EVERY EVENING 90 Tablet 1    vitamin e (E GEMS) 1,000 unit capsule Take 1,000 Units by mouth daily.  OMEPRAZOLE (PRILOSEC PO) Take  by mouth daily as needed.  CALCIUM CARBONATE/VITAMIN D3 (CALTRATE 600 + D PO) Take  by mouth daily.  CHOLECALCIFEROL, VITAMIN D3, (VITAMIN D3 PO) Take 1,000 Units by mouth daily.  cetirizine (ZYRTEC) 10 mg tablet Take  by mouth daily as needed.  Biotin 2,500 mcg cap Take  by mouth. (Patient not taking: Reported on 11/30/2021)      zinc sulfate (ORAZINC) 25 mg zinc (110 mg) tablet Take  by mouth daily. (Patient not taking: Reported on 11/30/2021)       No current facility-administered medications on file prior to visit.        No Known Allergies    Past Medical History:   Diagnosis Date    Cancer [facility]) 2005    breast (right)    Ill-defined condition     migraines    Lipoma 11/18/2013       Past Surgical History:   Procedure Laterality Date    HX BREAST LUMPECTOMY      s/p RTx (x7 weeks)    HX CHOLECYSTECTOMY      HX COLONOSCOPY   & 2/27/15    polyps 1st time, 2nd time normal - repeat 5 years - Dr. Marcell Nguyen HX UROLOGICAL  2013    sling       Family History   Problem Relation Age of Onset    Hypertension Mother     Cancer Mother         lung, liver, metastatic   [facility] Diabetes Sister     Hypertension Sister     Hypertension Brother     Coronary Art Dis Father         since age 42's    Heart Attack Father     Heart Disease Father     Cancer Maternal Grandmother         UNKNOWN    Heart Disease Maternal Grandfather     Cancer Paternal Grandmother     Bleeding Prob Paternal Grandmother         stomach    Lung Disease Paternal Grandfather     Hypertension Brother     Anesth Problems Neg Hx        Social History     Socioeconomic History    Marital status:      Spouse name: Not on file    Number of children: Not on file    Years of education: Not on file    Highest education level: Not on file   Occupational History    Not on file   Tobacco Use    Smoking status: Former Smoker     Packs/day: 0.50     Years: 15.00     Pack years: 7.50     Quit date: 1993     Years since quittin.9    Smokeless tobacco: Never Used   Vaping Use    Vaping Use: Never used   Substance and Sexual Activity    Alcohol use: Not Currently     Comment: rarely    Drug use: No    Sexual activity: Yes     Partners: Male     Birth control/protection: None   Other Topics Concern    Not on file   Social History Narrative    Not on file     Social Determinants of Health     Financial Resource Strain:     Difficulty of Paying Living Expenses: Not on file   Food Insecurity:     Worried About Running Out of Food in the Last Year: Not on file    Smith of Food in the Last Year: Not on file   Transportation Needs:     Lack of Transportation (Medical):  Not on file    Lack of Transportation (Non-Medical): Not on file   Physical Activity:     Days of Exercise per Week: Not on file    Minutes of Exercise per Session: Not on file   Stress:     Feeling of Stress : Not on file   Social Connections:     Frequency of Communication with Friends and Family: Not on file    Frequency of Social Gatherings with Friends and Family: Not on file    Attends Orthodoxy Services: Not on file    Active Member of [address] [place] [address] [employer] or Organizations: Not on file    Attends Club or Organization Meetings: Not on file    Marital Status: Not on file   Intimate Partner Violence:     Fear of Current or Ex-Partner: Not on file    Emotionally Abused: Not on file    Physically Abused: Not on file    Sexually Abused: Not on file   Housing Stability:     Unable to Pay for Housing in the Last Year: Not on file    Number of [place] in the Last Year: Not on file    Unstable Housing in the Last Year: Not on file       No visits with results within 3 Month(s) from this visit. Latest known visit with results is:   Office Visit on 11/24/2020   Component Date Value Ref Range Status    WBC 11/24/2020 5.6  3.4 - 10.8 x10E3/uL Final    RBC 11/24/2020 4.91  3.77 - 5.28 x10E6/uL Final    HGB 11/24/2020 15.2  11.1 - 15.9 g/dL Final    HCT 11/24/2020 45.1  34.0 - 46.6 % Final    MCV 11/24/2020 92  79 - 97 fL Final    MCH 11/24/2020 31.0  26.6 - 33.0 pg Final    MCHC 11/24/2020 33.7  31.5 - 35.7 g/dL Final    RDW 11/24/2020 12.1  11.7 - 15.4 % Final    PLATELET 91/79/2716 545  150 - 450 x10E3/uL Final    NEUTROPHILS 11/24/2020 53  Not Estab. % Final    Lymphocytes 11/24/2020 39  Not Estab. % Final    MONOCYTES 11/24/2020 6  Not Estab. % Final    EOSINOPHILS 11/24/2020 1  Not Estab. % Final    BASOPHILS 11/24/2020 1  Not Estab. % Final    ABS. NEUTROPHILS 11/24/2020 3.0  1.4 - 7.0 x10E3/uL Final    Abs Lymphocytes 11/24/2020 2.2  0.7 - 3.1 x10E3/uL Final    ABS.  MONOCYTES 11/24/2020 0.3  0.1 - 0.9 x10E3/uL Final    ABS. EOSINOPHILS 11/24/2020 0.1  0.0 - 0.4 x10E3/uL Final    ABS. BASOPHILS 11/24/2020 0.1  0.0 - 0.2 x10E3/uL Final    IMMATURE GRANULOCYTES 11/24/2020 0  Not Estab. % Final    ABS. IMM. GRANS. 11/24/2020 0.0  0.0 - 0.1 x10E3/uL Final    Glucose 11/24/2020 104* 65 - 99 mg/dL Final    BUN 11/24/2020 13  8 - 27 mg/dL Final    Creatinine 11/24/2020 0.68  0.57 - 1.00 mg/dL Final    GFR est non-AA 11/24/2020 93  >59 mL/min/1.73 Final    GFR est AA 11/24/2020 108  >59 mL/min/1.73 Final    BUN/Creatinine ratio 11/24/2020 19  12 - 28 Final    Sodium 11/24/2020 145* 134 - 144 mmol/L Final    Potassium 11/24/2020 4.6  3.5 - 5.2 mmol/L Final    Chloride 11/24/2020 107* 96 - 106 mmol/L Final    CO2 11/24/2020 27  20 - 29 mmol/L Final    Calcium 11/24/2020 9.6  8.7 - 10.3 mg/dL Final    Protein, total 11/24/2020 6.5  6.0 - 8.5 g/dL Final    Albumin 11/24/2020 4.6  3.8 - 4.8 g/dL Final    GLOBULIN, TOTAL 11/24/2020 1.9  1.5 - 4.5 g/dL Final    A-G Ratio 11/24/2020 2.4* 1.2 - 2.2 Final    Bilirubin, total 11/24/2020 0.6  0.0 - 1.2 mg/dL Final    Alk. phosphatase 11/24/2020 147* 39 - 117 IU/L Final    AST (SGOT) 11/24/2020 26  0 - 40 IU/L Final    ALT (SGPT) 11/24/2020 43* 0 - 32 IU/L Final    Specific Gravity 11/24/2020 1.024  1.005 - 1.030 Final    pH (UA) 11/24/2020 5.5  5.0 - 7.5 Final    Color 11/24/2020 Yellow  Yellow Final    Appearance 11/24/2020 Clear  Clear Final    Leukocyte Esterase 11/24/2020 Negative  Negative Final    Protein 11/24/2020 Trace  Negative/Trace Final    Glucose 11/24/2020 Negative  Negative Final    Ketone 11/24/2020 Negative  Negative Final    Blood 11/24/2020 Negative  Negative Final    Bilirubin 11/24/2020 Negative  Negative Final    Urobilinogen 11/24/2020 0.2  0.2 - 1.0 mg/dL Final    Nitrites 11/24/2020 Negative  Negative Final    Microscopic Examination 11/24/2020 Comment   Final    Microscopic follows if indicated.     Microscopic exam 11/24/2020 See additional order   Final    Microscopic was indicated and was performed.  URINALYSIS REFLEX 11/24/2020 Comment   Final    This specimen will not reflex to a Urine Culture.  WBC 11/24/2020 0-5  0 - 5 /hpf Final    RBC 11/24/2020 0-2  0 - 2 /hpf Final    Epithelial cells 11/24/2020 0-10  0 - 10 /hpf Final    Casts 11/24/2020 None seen  None seen /lpf Final    Crystals 11/24/2020 Present* N/A Final    Crystal type 11/24/2020 Calcium Oxalate  N/A Final    Mucus 11/24/2020 Present  Not Estab. Final    Bacteria 11/24/2020 Few  None seen/Few Final    Cholesterol, total 11/24/2020 149  100 - 199 mg/dL Final    Triglyceride 11/24/2020 129  0 - 149 mg/dL Final    HDL Cholesterol 11/24/2020 44  >39 mg/dL Final    VLDL, calculated 11/24/2020 23  5 - 40 mg/dL Final    LDL, calculated 11/24/2020 82  0 - 99 mg/dL Final       Review of Systems   Constitutional: Negative for activity change, fatigue and unexpected weight change. HENT: Positive for rhinorrhea. Negative for congestion, hearing loss and sore throat. Eyes: Negative for discharge. Respiratory: Negative for cough, chest tightness and shortness of breath. Cardiovascular: Negative for leg swelling. Gastrointestinal: Negative for abdominal pain, constipation and diarrhea. Heartburn   Genitourinary: Negative for dysuria, flank pain, frequency and urgency. Musculoskeletal: Negative for arthralgias, back pain and myalgias. Skin: Negative for color change and rash. Neurological: Positive for headaches. Negative for dizziness and light-headedness. Psychiatric/Behavioral: Negative for dysphoric mood and sleep disturbance. The patient is not nervous/anxious. Visit Vitals  /77 (BP 1 Location: Right arm, BP Patient Position: Sitting)   Pulse 65   Temp 97.8 °F (36.6 °C) (Temporal)   Resp 18   Ht 5' 5\" (1.651 m)   Wt 185 lb (83.9 kg)   SpO2 100%   BMI 30.79 kg/m²       Physical Exam  Vitals and nursing note reviewed. Constitutional:       General: She is not in acute distress. Appearance: Normal appearance. She is normal weight. She is not diaphoretic. HENT:      Right Ear: Tympanic membrane, ear canal and external ear normal. There is impacted cerumen. Left Ear: Tympanic membrane, ear canal and external ear normal.      Mouth/Throat:      Mouth: Mucous membranes are moist.      Pharynx: Oropharynx is clear. Eyes:      General:         Right eye: No discharge. Left eye: No discharge. Extraocular Movements: Extraocular movements intact. Conjunctiva/sclera: Conjunctivae normal.   Neck:      Thyroid: No thyromegaly. Cardiovascular:      Rate and Rhythm: Normal rate and regular rhythm. Pulses: Normal pulses. Dorsalis pedis pulses are 2+ on the right side and 2+ on the left side. Pulmonary:      Effort: Pulmonary effort is normal.      Breath sounds: Normal breath sounds. No wheezing. Abdominal:      General: Bowel sounds are normal. There is distension. Palpations: Abdomen is soft. Tenderness: There is no abdominal tenderness. Musculoskeletal:      Right lower leg: No edema. Left lower leg: No edema. Comments: B/L knees without crepitus   Lymphadenopathy:      Cervical: No cervical adenopathy. Neurological:      Deep Tendon Reflexes:      Reflex Scores:       Patellar reflexes are 2+ on the right side and 2+ on the left side. Psychiatric:         Mood and Affect: Mood and affect normal.           An electronic signature was used to authenticate this note.   -- Checo Kahn

## 2021-12-01 LAB
ALBUMIN SERPL-MCNC: 4.2 G/DL (ref 3.8–4.8)
ALBUMIN/GLOB SERPL: 1.9 {RATIO} (ref 1.2–2.2)
ALP SERPL-CCNC: 128 IU/L (ref 44–121)
ALT SERPL-CCNC: 41 IU/L (ref 0–32)
AST SERPL-CCNC: 26 IU/L (ref 0–40)
BILIRUB SERPL-MCNC: 0.5 MG/DL (ref 0–1.2)
BUN SERPL-MCNC: 13 MG/DL (ref 8–27)
BUN/CREAT SERPL: 18 (ref 12–28)
CALCIUM SERPL-MCNC: 9.4 MG/DL (ref 8.7–10.3)
CHLORIDE SERPL-SCNC: 108 MMOL/L (ref 96–106)
CHOLEST SERPL-MCNC: 209 MG/DL (ref 100–199)
CO2 SERPL-SCNC: 25 MMOL/L (ref 20–29)
CREAT SERPL-MCNC: 0.74 MG/DL (ref 0.57–1)
ERYTHROCYTE [DISTWIDTH] IN BLOOD BY AUTOMATED COUNT: 12.1 % (ref 11.7–15.4)
GLOBULIN SER CALC-MCNC: 2.2 G/DL (ref 1.5–4.5)
GLUCOSE SERPL-MCNC: 107 MG/DL (ref 65–99)
HCT VFR BLD AUTO: 45 % (ref 34–46.6)
HDLC SERPL-MCNC: 44 MG/DL
HEMOCCULT STL QL IA: NEGATIVE
HGB BLD-MCNC: 15.2 G/DL (ref 11.1–15.9)
LDLC SERPL CALC-MCNC: 138 MG/DL (ref 0–99)
MCH RBC QN AUTO: 31.7 PG (ref 26.6–33)
MCHC RBC AUTO-ENTMCNC: 33.8 G/DL (ref 31.5–35.7)
MCV RBC AUTO: 94 FL (ref 79–97)
PLATELET # BLD AUTO: 275 X10E3/UL (ref 150–450)
POTASSIUM SERPL-SCNC: 4 MMOL/L (ref 3.5–5.2)
PROT SERPL-MCNC: 6.4 G/DL (ref 6–8.5)
RBC # BLD AUTO: 4.79 X10E6/UL (ref 3.77–5.28)
SODIUM SERPL-SCNC: 145 MMOL/L (ref 134–144)
TRIGL SERPL-MCNC: 149 MG/DL (ref 0–149)
TSH SERPL DL<=0.005 MIU/L-ACNC: 3.13 UIU/ML (ref 0.45–4.5)
VLDLC SERPL CALC-MCNC: 27 MG/DL (ref 5–40)
WBC # BLD AUTO: 5.3 X10E3/UL (ref 3.4–10.8)

## 2021-12-01 NOTE — PROGRESS NOTES
Result discussed with patient. She will work on a diet and exercise. Will stay on the same dose of Lipitor for now. Recommended increase fluid intake.

## 2021-12-01 NOTE — PROGRESS NOTES
Results reviewed.   Release via [address] [place] [address], your stool test came back fine

## 2021-12-03 ENCOUNTER — HOSPITAL ENCOUNTER (OUTPATIENT)
Dept: MAMMOGRAPHY | Age: 66
Discharge: HOME | End: 2021-12-03
Attending: INTERNAL MEDICINE
Payer: COMMERCIAL

## 2021-12-03 DIAGNOSIS — M81.0 POSTMENOPAUSAL BONE LOSS: ICD-10-CM

## 2021-12-03 PROCEDURE — 77080 DXA BONE DENSITY AXIAL: CPT

## 2021-12-10 RX ORDER — CITALOPRAM 10 MG/1
TABLET ORAL
Qty: 90 TABLET | Refills: 0 | Status: SHIPPED | OUTPATIENT
Start: 2021-12-10 | End: 2022-03-05

## 2021-12-12 NOTE — PROGRESS NOTES
Results reviewed. Release via [address] [place] [address], Your Bone density scan is back and showed osteopenia. I would suggest weight lifting exercises , vitamin D 1000 I.U daily dose and calcium 500 mg 3 times a week.

## 2022-02-08 RX ORDER — ATORVASTATIN CALCIUM 10 MG/1
TABLET, FILM COATED ORAL
Qty: 90 TABLET | Refills: 1 | Status: SHIPPED | OUTPATIENT
Start: 2022-02-08 | End: 2022-08-11

## 2022-02-21 ENCOUNTER — NURSE TRIAGE (OUTPATIENT)
Dept: OTHER | Facility: CLINIC | Age: 67
End: 2022-02-21

## 2022-02-21 ENCOUNTER — OFFICE VISIT (OUTPATIENT)
Dept: PRIMARY CARE CLINIC | Age: 67
End: 2022-02-21
Payer: COMMERCIAL

## 2022-02-21 VITALS
DIASTOLIC BLOOD PRESSURE: 83 MMHG | WEIGHT: 184.6 LBS | BODY MASS INDEX: 30.75 KG/M2 | HEIGHT: 65 IN | RESPIRATION RATE: 15 BRPM | OXYGEN SATURATION: 98 % | TEMPERATURE: 97.5 F | SYSTOLIC BLOOD PRESSURE: 129 MMHG | HEART RATE: 63 BPM

## 2022-02-21 DIAGNOSIS — R10.33 PERIUMBILICAL ABDOMINAL PAIN: ICD-10-CM

## 2022-02-21 DIAGNOSIS — R43.8 METALLIC TASTE: ICD-10-CM

## 2022-02-21 DIAGNOSIS — R19.05 ABDOMINAL WALL MASS OF PERIUMBILICAL REGION: Primary | ICD-10-CM

## 2022-02-21 PROCEDURE — 99214 OFFICE O/P EST MOD 30 MIN: CPT | Performed by: INTERNAL MEDICINE

## 2022-02-21 RX ORDER — SULFAMETHOXAZOLE AND TRIMETHOPRIM 800; 160 MG/1; MG/1
1 TABLET ORAL 2 TIMES DAILY
Qty: 20 TABLET | Refills: 0 | Status: SHIPPED | OUTPATIENT
Start: 2022-02-21 | End: 2022-03-03

## 2022-02-21 NOTE — TELEPHONE ENCOUNTER
Received call from Herberth Bowden at [facility] with [employer] Complaint. Subjective: Caller states \"I started feeling itchy in my belly button on Friday. Yesterday it started to be painful. \"     Current Symptoms: itching and painful in bellybutton     Onset: 4 days ago; gradual    Associated Symptoms: NA    Pain Severity: 6/10; burning; constant    Temperature: denies    What has been tried: NA    LMP: NA Pregnant: NA    Recommended disposition: to be seen today    Care advice provided, patient verbalizes understanding; denies any other questions or concerns; instructed to call back for any new or worsening symptoms. Patient/Caller agrees with recommended disposition; writer provided warm transfer to Anmol Jj at [facility] for appointment scheduling    Attention Provider: Thank you for allowing me to participate in the care of your patient. The patient was connected to triage in response to information provided to the [facility]. Please do not respond through this encounter as the response is not directed to a shared pool.       Reason for Disposition   Swelling is painful to touch and no fever    Protocols used: SKIN LUMP OR LOCALIZED SWELLING-ADULT-[place]

## 2022-02-21 NOTE — PROGRESS NOTES
Jalen Hanna (: 1955) is a 61 y.o. female, established patient, here for evaluation of the following chief complaint(s):  Skin Problem (belly button area, itching and hurts)     Written by Evonne Quintanilla, as dictated by Dr. Jesica Bay MD.      ASSESSMENT/PLAN:  Below is the assessment and plan developed based on review of pertinent history, physical exam, labs, studies, and medications. 1. Abdominal wall mass of periumbilical region  On exam, there is a small palpable mass near her umbilical area. I rx;d a course of Bactrim DS, Septra -800 mg BID x10 days. Potential side effects were discussed. Also ordered an abdominal US for further evaluation. -     trimethoprim-sulfamethoxazole (BACTRIM DS, SEPTRA DS) 160-800 mg per tablet; Take 1 Tablet by mouth two (2) times a day for 10 days. , Normal, Disp-20 Tablet, R-0 sent to pharmacy. -     US ABD COMP; Future    2. Periumbilical abdominal pain  On exam, there is a small palpable mass near her umbilical area. I rx;d a course of Bactrim DS, Septra -800 mg BID x10 days. Potential side effects were discussed. Also ordered an abdominal US for further evaluation. -     trimethoprim-sulfamethoxazole (BACTRIM DS, SEPTRA DS) 160-800 mg per tablet; Take 1 Tablet by mouth two (2) times a day for 10 days. , Normal, Disp-20 Tablet, R-0 sent to pharmacy. -     US ABD COMP; Future    3. Metallic taste  Discussed that she is not on any medications that should cause a metallic taste. We will workup her abdominal pain now and address this at her next follow-up. SUBJECTIVE/OBJECTIVE:  HPI   The patient presents today c/o a pain near her umbilicus. Symptoms started as itching near her umbilicus 3 days ago and developed pain a day later. The pain has become worse and she describes a burning sensation. She also mentions a metallic taste in her mouth and states her  noted her breath smells like chlorine. She denies any changes in her diet. Patient Active Problem List   Diagnosis Code    History of breast cancer Z85.3    S/P cholecystectomy Z90.49    S/P tubal ligation Z98.51    H/O bone density study Z92.89    Obesity (BMI 30-39. 9) E66.9    Obstructive sleep apnea syndrome G47.33    Advance directive discussed with patient Z71.89    Fatty liver K76.0    Mixed hyperlipidemia E78.2        Current Outpatient Medications on File Prior to Visit   Medication Sig Dispense Refill    atorvastatin (LIPITOR) 10 mg tablet TAKE 1 TABLET BY MOUTH EVERY EVENING 90 Tablet 1    citalopram (CELEXA) 10 mg tablet TAKE 1 TABLET BY MOUTH EVERY DAY 90 Tablet 0    vitamin e (E GEMS) 1,000 unit capsule Take 1,000 Units by mouth daily.  OMEPRAZOLE (PRILOSEC PO) Take  by mouth daily as needed.  CALCIUM CARBONATE/VITAMIN D3 (CALTRATE 600 + D PO) Take  by mouth daily.  CHOLECALCIFEROL, VITAMIN D3, (VITAMIN D3 PO) Take 1,000 Units by mouth daily.  cetirizine (ZYRTEC) 10 mg tablet Take  by mouth daily as needed.  Biotin 2,500 mcg cap Take  by mouth. (Patient not taking: Reported on 11/30/2021)      zinc sulfate (ORAZINC) 25 mg zinc (110 mg) tablet Take  by mouth daily. (Patient not taking: Reported on 11/30/2021)       No current facility-administered medications on file prior to visit.        No Known Allergies    Past Medical History:   Diagnosis Date    Cancer [facility]) 2005    breast (right)    Ill-defined condition     migraines    Lipoma 11/18/2013       Past Surgical History:   Procedure Laterality Date    HX BREAST LUMPECTOMY      s/p RTx (x7 weeks)    HX CHOLECYSTECTOMY  1997    HX COLONOSCOPY  2009 & 2/27/15    polyps 1st time, 2nd time normal - repeat 5 years - Dr. Victorina Hancock HX UROLOGICAL  2013    sling       Family History   Problem Relation Age of Onset    Hypertension Mother     Cancer Mother         lung, liver, metastatic    Diabetes Sister     Hypertension Sister     Hypertension Brother     Coronary Art Dis Father         since age 42's    Heart Attack Father     Heart Disease Father     Cancer Maternal Grandmother         UNKNOWN    Heart Disease Maternal Grandfather     Cancer Paternal Grandmother     Bleeding Prob Paternal Grandmother         stomach    Lung Disease Paternal Grandfather     Hypertension Brother     Anesth Problems Neg Hx        Social History     Socioeconomic History    Marital status:      Spouse name: Not on file    Number of children: Not on file    Years of education: Not on file    Highest education level: Not on file   Occupational History    Not on file   Tobacco Use    Smoking status: Former Smoker     Packs/day: 0.50     Years: 15.00     Pack years: 7.50     Quit date: 1993     Years since quittin.2    Smokeless tobacco: Never Used   Vaping Use    Vaping Use: Never used   Substance and Sexual Activity    Alcohol use: Not Currently     Comment: rarely    Drug use: No    Sexual activity: Yes     Partners: Male     Birth control/protection: None   Other Topics Concern    Not on file   Social History Narrative    Not on file         Office Visit on 2021   Component Date Value Ref Range Status    Glucose 2021 107* 65 - 99 mg/dL Final    BUN 2021 13  8 - 27 mg/dL Final    Creatinine 2021 0.74  0.57 - 1.00 mg/dL Final    GFR est non-AA 2021 86  >59 mL/min/1.73 Final    GFR est AA 2021 99  >59 mL/min/1.73 Final    Comment: **In accordance with recommendations from the NKF-ASN Task force,**    [facility] is in the process of updating its eGFR calculation to the     CKD-EPI creatinine equation that estimates kidney function    without a race variable.       BUN/Creatinine ratio 2021 18  12 - 28 Final    Sodium 2021 145* 134 - 144 mmol/L Final    Potassium 2021 4.0  3.5 - 5.2 mmol/L Final    Chloride 2021 108* 96 - 106 mmol/L Final    CO2 2021 25  20 - 29 mmol/L Final    Calcium 11/30/2021 9.4  8.7 - 10.3 mg/dL Final    Protein, total 11/30/2021 6.4  6.0 - 8.5 g/dL Final    Albumin 11/30/2021 4.2  3.8 - 4.8 g/dL Final    GLOBULIN, TOTAL 11/30/2021 2.2  1.5 - 4.5 g/dL Final    A-G Ratio 11/30/2021 1.9  1.2 - 2.2 Final    Bilirubin, total 11/30/2021 0.5  0.0 - 1.2 mg/dL Final    Alk. phosphatase 11/30/2021 128* 44 - 121 IU/L Final                  **Please note reference interval change**    AST (SGOT) 11/30/2021 26  0 - 40 IU/L Final    ALT (SGPT) 11/30/2021 41* 0 - 32 IU/L Final    WBC 11/30/2021 5.3  3.4 - 10.8 x10E3/uL Final    RBC 11/30/2021 4.79  3.77 - 5.28 x10E6/uL Final    HGB 11/30/2021 15.2  11.1 - 15.9 g/dL Final    HCT 11/30/2021 45.0  34.0 - 46.6 % Final    MCV 11/30/2021 94  79 - 97 fL Final    MCH 11/30/2021 31.7  26.6 - 33.0 pg Final    MCHC 11/30/2021 33.8  31.5 - 35.7 g/dL Final    RDW 11/30/2021 12.1  11.7 - 15.4 % Final    PLATELET 45/21/9092 067  150 - 450 x10E3/uL Final    Cholesterol, total 11/30/2021 209* 100 - 199 mg/dL Final    Triglyceride 11/30/2021 149  0 - 149 mg/dL Final    HDL Cholesterol 11/30/2021 44  >39 mg/dL Final    VLDL, calculated 11/30/2021 27  5 - 40 mg/dL Final    LDL, calculated 11/30/2021 138* 0 - 99 mg/dL Final    TSH 11/30/2021 3.130  0.450 - 4.500 uIU/mL Final    Occult blood fecal, by IA 11/30/2021 Negative  Negative Final      Review of Systems   Constitutional: Negative for activity change, fatigue and unexpected weight change. HENT: Negative for congestion, hearing loss, rhinorrhea and sore throat. Eyes: Negative for discharge. Respiratory: Negative for cough, chest tightness and shortness of breath. Cardiovascular: Negative for leg swelling. Gastrointestinal: Positive for abdominal pain. Negative for constipation and diarrhea. Genitourinary: Negative for dysuria, flank pain, frequency and urgency. Musculoskeletal: Negative for arthralgias, back pain and myalgias.    Skin: Negative for color change and rash. Neurological: Negative for dizziness, light-headedness and headaches. Psychiatric/Behavioral: Negative for dysphoric mood and sleep disturbance. The patient is not nervous/anxious. Visit Vitals  /83 (BP 1 Location: Left arm)   Pulse 63   Temp 97.5 °F (36.4 °C)   Resp 15   Ht 5' 5\" (1.651 m)   Wt 184 lb 9.6 oz (83.7 kg)   SpO2 98%   BMI 30.72 kg/m²      Physical Exam  Vitals and nursing note reviewed. Constitutional:       General: She is not in acute distress. Appearance: Normal appearance. She is well-developed. She is not diaphoretic. HENT:      Right Ear: External ear normal.      Left Ear: External ear normal.   Eyes:      General: No scleral icterus. Right eye: No discharge. Left eye: No discharge. Extraocular Movements: Extraocular movements intact. Conjunctiva/sclera: Conjunctivae normal.   Cardiovascular:      Rate and Rhythm: Normal rate and regular rhythm. Pulmonary:      Effort: Pulmonary effort is normal.      Breath sounds: Normal breath sounds. No wheezing. Abdominal:      General: Bowel sounds are normal.      Palpations: Abdomen is soft. There is mass (small mass near umbilical area). Tenderness: There is no abdominal tenderness. Musculoskeletal:      Cervical back: Normal range of motion and neck supple. Lymphadenopathy:      Cervical: No cervical adenopathy. Neurological:      Mental Status: She is alert and oriented to person, place, and time. Psychiatric:         Mood and Affect: Mood and affect normal.         An electronic signature was used to authenticate this note.   -- Ginny Mouse

## 2022-02-21 NOTE — PROGRESS NOTES
Chief Complaint   Patient presents with    Skin Problem     belly button area, itching and hurts       Visit Vitals  /83 (BP 1 Location: Left arm)   Pulse 63   Temp 97.5 °F (36.4 °C)   Resp 15   Ht 5' 5\" (1.651 m)   Wt 184 lb 9.6 oz (83.7 kg)   SpO2 98%   BMI 30.72 kg/m²       1. Have you been to the ER, urgent care clinic since your last visit? Hospitalized since your last visit? No    2. Have you seen or consulted any other health care providers outside of the [address] [place] [address] since your last visit? Include any pap smears or colon screening.  Yes [facility] oncologist

## 2022-02-23 ENCOUNTER — APPOINTMENT (OUTPATIENT)
Dept: CT IMAGING | Age: 67
End: 2022-02-23
Attending: EMERGENCY MEDICINE
Payer: COMMERCIAL

## 2022-02-23 ENCOUNTER — HOSPITAL ENCOUNTER (EMERGENCY)
Age: 67
Discharge: HOME | End: 2022-02-23
Attending: EMERGENCY MEDICINE
Payer: COMMERCIAL

## 2022-02-23 VITALS
OXYGEN SATURATION: 98 % | RESPIRATION RATE: 16 BRPM | TEMPERATURE: 97 F | DIASTOLIC BLOOD PRESSURE: 87 MMHG | HEART RATE: 70 BPM | SYSTOLIC BLOOD PRESSURE: 159 MMHG

## 2022-02-23 DIAGNOSIS — L02.216 ABSCESS, UMBILICAL: Primary | ICD-10-CM

## 2022-02-23 LAB
ALBUMIN SERPL-MCNC: 3.9 G/DL (ref 3.5–5)
ALBUMIN/GLOB SERPL: 1.1 {RATIO} (ref 1.1–2.2)
ALP SERPL-CCNC: 120 U/L (ref 45–117)
ALT SERPL-CCNC: 33 U/L (ref 12–78)
ANION GAP SERPL CALC-SCNC: 6 MMOL/L (ref 5–15)
AST SERPL-CCNC: 17 U/L (ref 15–37)
BASOPHILS # BLD: 0 K/UL (ref 0–0.1)
BASOPHILS NFR BLD: 0 % (ref 0–1)
BILIRUB SERPL-MCNC: 0.6 MG/DL (ref 0.2–1)
BUN SERPL-MCNC: 14 MG/DL (ref 6–20)
BUN/CREAT SERPL: 17 (ref 12–20)
CALCIUM SERPL-MCNC: 9.8 MG/DL (ref 8.5–10.1)
CHLORIDE SERPL-SCNC: 107 MMOL/L (ref 97–108)
CO2 SERPL-SCNC: 26 MMOL/L (ref 21–32)
CREAT SERPL-MCNC: 0.81 MG/DL (ref 0.55–1.02)
DIFFERENTIAL METHOD BLD: NORMAL
EOSINOPHIL # BLD: 0 K/UL (ref 0–0.4)
EOSINOPHIL NFR BLD: 0 % (ref 0–7)
ERYTHROCYTE [DISTWIDTH] IN BLOOD BY AUTOMATED COUNT: 12.2 % (ref 11.5–14.5)
GLOBULIN SER CALC-MCNC: 3.6 G/DL (ref 2–4)
GLUCOSE SERPL-MCNC: 102 MG/DL (ref 65–100)
HCT VFR BLD AUTO: 45.1 % (ref 35–47)
HGB BLD-MCNC: 15.3 G/DL (ref 11.5–16)
IMM GRANULOCYTES # BLD AUTO: 0 K/UL (ref 0–0.04)
IMM GRANULOCYTES NFR BLD AUTO: 0 % (ref 0–0.5)
LYMPHOCYTES # BLD: 2.2 K/UL (ref 0.8–3.5)
LYMPHOCYTES NFR BLD: 23 % (ref 12–49)
MCH RBC QN AUTO: 31.5 PG (ref 26–34)
MCHC RBC AUTO-ENTMCNC: 33.9 G/DL (ref 30–36.5)
MCV RBC AUTO: 92.8 FL (ref 80–99)
MONOCYTES # BLD: 0.6 K/UL (ref 0–1)
MONOCYTES NFR BLD: 6 % (ref 5–13)
NEUTS SEG # BLD: 6.6 K/UL (ref 1.8–8)
NEUTS SEG NFR BLD: 71 % (ref 32–75)
NRBC # BLD: 0 K/UL (ref 0–0.01)
NRBC BLD-RTO: 0 PER 100 WBC
PLATELET # BLD AUTO: 260 K/UL (ref 150–400)
PMV BLD AUTO: 10.7 FL (ref 8.9–12.9)
POTASSIUM SERPL-SCNC: 3.8 MMOL/L (ref 3.5–5.1)
PROT SERPL-MCNC: 7.5 G/DL (ref 6.4–8.2)
RBC # BLD AUTO: 4.86 M/UL (ref 3.8–5.2)
SODIUM SERPL-SCNC: 139 MMOL/L (ref 136–145)
WBC # BLD AUTO: 9.5 K/UL (ref 3.6–11)

## 2022-02-23 PROCEDURE — 74177 CT ABD & PELVIS W/CONTRAST: CPT

## 2022-02-23 PROCEDURE — 36415 COLL VENOUS BLD VENIPUNCTURE: CPT

## 2022-02-23 PROCEDURE — 74011000636 HC RX REV CODE- 636: Performed by: EMERGENCY MEDICINE

## 2022-02-23 PROCEDURE — 80053 COMPREHEN METABOLIC PANEL: CPT

## 2022-02-23 PROCEDURE — 85025 COMPLETE CBC W/AUTO DIFF WBC: CPT

## 2022-02-23 PROCEDURE — 99281 EMR DPT VST MAYX REQ PHY/QHP: CPT

## 2022-02-23 RX ORDER — HYDROCODONE BITARTRATE AND ACETAMINOPHEN 5; 325 MG/1; MG/1
1 TABLET ORAL
Qty: 9 TABLET | Refills: 0 | Status: SHIPPED | OUTPATIENT
Start: 2022-02-23 | End: 2022-02-25 | Stop reason: ALTCHOICE

## 2022-02-23 RX ADMIN — IOPAMIDOL 100 ML: 755 INJECTION, SOLUTION INTRAVENOUS at 16:06

## 2022-02-23 NOTE — ED PROVIDER NOTES
Date of Service:  2/23/22    Patient:  Nathalia Conner    Chief Complaint:  Skin Problem       HPI:  Nathalia Conner is a 61 y.o.  female who presents for evaluation of a skin problem. For the last 5 days, she has had a pain at her umbilicus. She notes a \"bump\" starting to grow there. She was seen by her PCP who ordered an [address] but that is not until tomorrow. She has worsening size in the bump, its constantly painful to the point where she cant sleep. No nausea or vomiting. Last BM was 5 days ago. Passing gas. No cp/sob. Currently on bactrim for possible infection.             Past Medical History:   Diagnosis Date    Cancer [facility]) 2005    breast (right)    Ill-defined condition     migraines    Lipoma 11/18/2013       Past Surgical History:   Procedure Laterality Date    HX BREAST LUMPECTOMY      s/p RTx (x7 weeks)    HX CHOLECYSTECTOMY  1997    HX COLONOSCOPY  2009 & 2/27/15    polyps 1st time, 2nd time normal - repeat 5 years -  [facility] HX UROLOGICAL  2013    sling         Family History:   Problem Relation Age of Onset    Hypertension Mother     Cancer Mother         lung, liver, metastatic    Diabetes Sister     Hypertension Sister     Hypertension Brother     Coronary Art Dis Father         since age 42's    Heart Attack Father     Heart Disease Father     Cancer Maternal Grandmother         UNKNOWN    Heart Disease Maternal Grandfather     Cancer Paternal Grandmother     Bleeding Prob Paternal Grandmother         stomach    Lung Disease Paternal Grandfather     Hypertension Brother     Anesth Problems Neg Hx        Social History     Socioeconomic History    Marital status:      Spouse name: Not on file    Number of children: Not on file    Years of education: Not on file    Highest education level: Not on file   Occupational History    Not on file   Tobacco Use    Smoking status: Former Smoker     Packs/day: 0.50     Years: 15.00     Pack years: 7.50 Quit date: 1993     Years since quittin.2    Smokeless tobacco: Never Used   Vaping Use    Vaping Use: Never used   Substance and Sexual Activity    Alcohol use: Not Currently     Comment: rarely    Drug use: No    Sexual activity: Yes     Partners: Male     Birth control/protection: None   Other Topics Concern    Not on file   Social History Narrative    Not on file     Social Determinants of Health     Financial Resource Strain:     Difficulty of Paying Living Expenses: Not on file   Food Insecurity:     Worried About Running Out of Food in the Last Year: Not on file    Smith of Food in the Last Year: Not on file   Transportation Needs:     Lack of Transportation (Medical): Not on file    Lack of Transportation (Non-Medical): Not on file   Physical Activity:     Days of Exercise per Week: Not on file    Minutes of Exercise per Session: Not on file   Stress:     Feeling of Stress : Not on file   Social Connections:     Frequency of Communication with Friends and Family: Not on file    Frequency of Social Gatherings with Friends and Family: Not on file    Attends Latter day Services: Not on file    Active Member of [address] [place] [address] or Organizations: Not on file    Attends Club or Organization Meetings: Not on file    Marital Status: Not on file   Intimate Partner Violence:     Fear of Current or Ex-Partner: Not on file    Emotionally Abused: Not on file    Physically Abused: Not on file    Sexually Abused: Not on file   Housing Stability:     Unable to Pay for Housing in the Last Year: Not on file    Number of [place] in the Last Year: Not on file    Unstable Housing in the Last Year: Not on file         ALLERGIES: Patient has no known allergies. Review of Systems   Skin: Positive for color change. All other systems reviewed and are negative.       Vitals:    22 1113   BP: (!) 159/87   Pulse: 70   Resp: 16   Temp: 97 °F (36.1 °C)   SpO2: 98%            Physical Exam  Vitals reviewed. Constitutional:       Appearance: Normal appearance. HENT:      Head: Normocephalic and atraumatic. Mouth/Throat:      Mouth: Mucous membranes are moist.   Eyes:      General: No scleral icterus. Cardiovascular:      Rate and Rhythm: Normal rate. Pulmonary:      Effort: Pulmonary effort is normal.   Abdominal:      General: Abdomen is flat. Comments: Firmness, painful area. Hard to touch, painful. No skin color change. Musculoskeletal:         General: No deformity. Skin:     General: Skin is warm. Capillary Refill: Capillary refill takes less than 2 seconds. Neurological:      Mental Status: She is alert and oriented to person, place, and time. Psychiatric:         Mood and Affect: Mood normal.          [facility]  ED Course as of 02/23/22 2203   Wed Feb 23, 2022   1231 History of GB removal [GG]      ED Course User Index  [GG] Arron Mcelroy DO     VITAL SIGNS:  No data found. LABS:  No results found for this or any previous visit (from the past 6 hour(s)). IMAGING:  CT ABD PELV W CONT   Final Result   1. Small umbilical abscess. 2. Hepatic steatosis. Medications During Visit:  Medications   iopamidoL (ISOVUE-370) 76 % injection 100 mL (100 mL IntraVENous Given 2/23/22 1606)         DECISION MAKING:  Jalen Lawton is a 61 y.o. female who comes in as above. Diagnostics as above. Umbilical abscess. From the surface it does not appear like an abscess, I do not feel comfortable incising this in the ER due to its depth and appearance. This time I did speak with general surgery who recommended warm compresses, continue antibiotics and follow-up with him as needed in the outpatient setting. Patient is agreeable to this plan. Short course of pain control. IMPRESSION:  1.  Abscess, umbilical        DISPOSITION:  Discharged      Discharge Medication List as of 2/23/2022  5:49 PM      START taking these medications    Details HYDROcodone-acetaminophen (Norco) 5-325 mg per tablet Take 1 Tablet by mouth every four (4) hours as needed for Pain for up to 3 days. Max Daily Amount: 6 Tablets., Normal, Disp-9 Tablet, R-0         CONTINUE these medications which have NOT CHANGED    Details   trimethoprim-sulfamethoxazole (BACTRIM DS, SEPTRA DS) 160-800 mg per tablet Take 1 Tablet by mouth two (2) times a day for 10 days. , Normal, Disp-20 Tablet, R-0      atorvastatin (LIPITOR) 10 mg tablet TAKE 1 TABLET BY MOUTH EVERY EVENING, Normal, Disp-90 Tablet, R-1      citalopram (CELEXA) 10 mg tablet TAKE 1 TABLET BY MOUTH EVERY DAY, Normal, Disp-90 Tablet, R-0      Biotin 2,500 mcg cap Take  by mouth., Historical Med      zinc sulfate (ORAZINC) 25 mg zinc (110 mg) tablet Take  by mouth daily. , Historical Med      vitamin e (E GEMS) 1,000 unit capsule Take 1,000 Units by mouth daily. , Historical Med      OMEPRAZOLE (PRILOSEC PO) Take  by mouth daily as needed., Historical Med      CALCIUM CARBONATE/VITAMIN D3 (CALTRATE 600 + D PO) Take  by mouth daily. Historical Med      CHOLECALCIFEROL, VITAMIN D3, (VITAMIN D3 PO) Take 1,000 Units by mouth daily. , Historical Med      cetirizine (ZYRTEC) 10 mg tablet Take  by mouth daily as needed., Historical Med              Follow-up Information     Follow up With Specialties Details Why Contact Info    Patrica Madison MD Internal Medicine, Bariatrics Schedule an appointment as soon as possible for a visit   [address] [place] [address]       Parish Snell MD General Surgery Call  As needed [id number] [phone]  [address]  [phone]              The patient is asked to follow-up with their primary care provider in the next several days. They are to call tomorrow for an appointment. The patient is asked to return promptly for any increased concerns or worsening of symptoms.   They can return to this emergency department or any other emergency department.     Procedures

## 2022-02-23 NOTE — ED TRIAGE NOTES
Pt stated she has a bump inside her belly button that is getting bigger, denies drainage, unable to sleep

## 2022-02-25 ENCOUNTER — OFFICE VISIT (OUTPATIENT)
Dept: PRIMARY CARE CLINIC | Age: 67
End: 2022-02-25
Payer: COMMERCIAL

## 2022-02-25 VITALS
HEART RATE: 68 BPM | DIASTOLIC BLOOD PRESSURE: 76 MMHG | BODY MASS INDEX: 30.82 KG/M2 | RESPIRATION RATE: 15 BRPM | TEMPERATURE: 97.5 F | OXYGEN SATURATION: 97 % | HEIGHT: 65 IN | WEIGHT: 185 LBS | SYSTOLIC BLOOD PRESSURE: 125 MMHG

## 2022-02-25 DIAGNOSIS — R10.33 PERIUMBILICAL ABDOMINAL PAIN: ICD-10-CM

## 2022-02-25 DIAGNOSIS — L02.216 ABSCESS, UMBILICAL: Primary | ICD-10-CM

## 2022-02-25 PROCEDURE — 99214 OFFICE O/P EST MOD 30 MIN: CPT | Performed by: INTERNAL MEDICINE

## 2022-02-25 RX ORDER — CLINDAMYCIN HYDROCHLORIDE 300 MG/1
300 CAPSULE ORAL 3 TIMES DAILY
Qty: 30 CAPSULE | Refills: 0 | Status: SHIPPED | OUTPATIENT
Start: 2022-02-25 | End: 2022-02-26

## 2022-02-25 RX ORDER — TRAMADOL HYDROCHLORIDE 50 MG/1
50 TABLET ORAL
Qty: 10 TABLET | Refills: 0 | Status: SHIPPED | OUTPATIENT
Start: 2022-02-25 | End: 2022-03-04

## 2022-02-25 NOTE — PROGRESS NOTES
Jalen Leon (: 1955) is a 61 y.o. female, established patient, here for evaluation of the following chief complaint(s):  Follow-up (ED for the umbillical absess)     Written by Terry Weber, as dictated by Dr. Aris Bhatt MD.      ASSESSMENT/PLAN:  Below is the assessment and plan developed based on review of pertinent history, physical exam, labs, studies, and medications. 1. Abscess, umbilical  On exam, her umbilical abscess appears to be getting larger and more red. I provided her with a referral to Dr. Grant Liu (general surgery). She likely needs the abscess drained. We will switch her from Bactrim DS, Septra DS to a course of cindamycin 300 mg TID x10 days. Potential side effects were discussed. Recommended she take an OTC probiotic while on antibiotics. -     REFERRAL TO GENERAL SURGERY  -     clindamycin (CLEOCIN) 300 mg capsule; Take 1 Capsule by mouth three (3) times daily for 10 days. , Normal, Disp-30 Capsule, R-0 sent to pharmacy. 2. Periumbilical abdominal pain  On exam, her umbilical abscess appears to be getting larger and more red. I provided her with a referral to Dr. Grant Liu (general surgery). She likely needs the abscess drained. -     REFERRAL TO GENERAL SURGERY    SUBJECTIVE/OBJECTIVE:  HPI   The patient presents today for an ED follow-up. She was seen at [facility] on 22 c/o pain at her umbilicus. She was last seen by me on 22 c/o the same pain in her umbilicus and was rx'd a course of Bactrim DS, Septra DS. At the ED she was dx'd with an umbilical abscess and recommended to continue on antibiotics and apply warm compresses. Today, she continues to have worsening pain in her umbilicus area. Patient Active Problem List   Diagnosis Code    History of breast cancer Z85.3    S/P cholecystectomy Z90.49    S/P tubal ligation Z98.51    H/O bone density study Z92.89    Obesity (BMI 30-39. 9) E66.9    Obstructive sleep apnea syndrome G47.33    Advance directive discussed with patient Z70.80    Fatty liver K76.0    Mixed hyperlipidemia E78.2        Current Outpatient Medications on File Prior to Visit   Medication Sig Dispense Refill    HYDROcodone-acetaminophen (Norco) 5-325 mg per tablet Take 1 Tablet by mouth every four (4) hours as needed for Pain for up to 3 days. Max Daily Amount: 6 Tablets. 9 Tablet 0    trimethoprim-sulfamethoxazole (BACTRIM DS, SEPTRA DS) 160-800 mg per tablet Take 1 Tablet by mouth two (2) times a day for 10 days. 20 Tablet 0    atorvastatin (LIPITOR) 10 mg tablet TAKE 1 TABLET BY MOUTH EVERY EVENING 90 Tablet 1    citalopram (CELEXA) 10 mg tablet TAKE 1 TABLET BY MOUTH EVERY DAY 90 Tablet 0    vitamin e (E GEMS) 1,000 unit capsule Take 1,000 Units by mouth daily.  OMEPRAZOLE (PRILOSEC PO) Take  by mouth daily as needed.  CALCIUM CARBONATE/VITAMIN D3 (CALTRATE 600 + D PO) Take  by mouth daily.  CHOLECALCIFEROL, VITAMIN D3, (VITAMIN D3 PO) Take 1,000 Units by mouth daily.  cetirizine (ZYRTEC) 10 mg tablet Take  by mouth daily as needed.  Biotin 2,500 mcg cap Take  by mouth. (Patient not taking: Reported on 11/30/2021)      zinc sulfate (ORAZINC) 25 mg zinc (110 mg) tablet Take  by mouth daily. (Patient not taking: Reported on 11/30/2021)       No current facility-administered medications on file prior to visit.        No Known Allergies    Past Medical History:   Diagnosis Date    Cancer [facility]) 2005    breast (right)    Ill-defined condition     migraines    Lipoma 11/18/2013       Past Surgical History:   Procedure Laterality Date    HX BREAST LUMPECTOMY      s/p RTx (x7 weeks)    HX CHOLECYSTECTOMY  1997    HX COLONOSCOPY  2009 & 2/27/15    polyps 1st time, 2nd time normal - repeat 5 years - Dr. Adin Taylor HX UROLOGICAL  2013    sling       Family History   Problem Relation Age of Onset    Hypertension Mother     Cancer Mother         lung, liver, metastatic    Diabetes Sister  Hypertension Sister     Hypertension Brother     Coronary Art Dis Father         since age 42's    Heart Attack Father     Heart Disease Father     Cancer Maternal Grandmother         UNKNOWN    Heart Disease Maternal Grandfather     Cancer Paternal Grandmother     Bleeding Prob Paternal Grandmother         stomach    Lung Disease Paternal Grandfather     Hypertension Brother     Anesth Problems Neg Hx        Social History     Socioeconomic History    Marital status:      Spouse name: Not on file    Number of children: Not on file    Years of education: Not on file    Highest education level: Not on file   Occupational History    Not on file   Tobacco Use    Smoking status: Former Smoker     Packs/day: 0.50     Years: 15.00     Pack years: 7.50     Quit date: 1993     Years since quittin.2    Smokeless tobacco: Never Used   Vaping Use    Vaping Use: Never used   Substance and Sexual Activity    Alcohol use: Not Currently     Comment: rarely    Drug use: No    Sexual activity: Yes     Partners: Male     Birth control/protection: None   Other Topics Concern    Not on file   Social History Narrative    Not on file       Admission on 2022, Discharged on 2022   Component Date Value Ref Range Status    WBC 2022 9.5  3.6 - 11.0 K/uL Final    RBC 2022 4.86  3.80 - 5.20 M/uL Final    HGB 2022 15.3  11.5 - 16.0 g/dL Final    HCT 2022 45.1  35.0 - 47.0 % Final    MCV 2022 92.8  80.0 - 99.0 FL Final    MCH 2022 31.5  26.0 - 34.0 PG Final    MCHC 2022 33.9  30.0 - 36.5 g/dL Final    RDW 2022 12.2  11.5 - 14.5 % Final    PLATELET  804  150 - 400 K/uL Final    MPV 2022 10.7  8.9 - 12.9 FL Final    NRBC 2022 0.0  0  WBC Final    ABSOLUTE NRBC 2022 0.00  0.00 - 0.01 K/uL Final    NEUTROPHILS 2022 71  32 - 75 % Final    LYMPHOCYTES 2022 23  12 - 49 % Final    MONOCYTES 02/23/2022 6  5 - 13 % Final    EOSINOPHILS 02/23/2022 0  0 - 7 % Final    BASOPHILS 02/23/2022 0  0 - 1 % Final    IMMATURE GRANULOCYTES 02/23/2022 0  0.0 - 0.5 % Final    ABS. NEUTROPHILS 02/23/2022 6.6  1.8 - 8.0 K/UL Final    ABS. LYMPHOCYTES 02/23/2022 2.2  0.8 - 3.5 K/UL Final    ABS. MONOCYTES 02/23/2022 0.6  0.0 - 1.0 K/UL Final    ABS. EOSINOPHILS 02/23/2022 0.0  0.0 - 0.4 K/UL Final    ABS. BASOPHILS 02/23/2022 0.0  0.0 - 0.1 K/UL Final    ABS. IMM. GRANS. 02/23/2022 0.0  0.00 - 0.04 K/UL Final    DF 02/23/2022 AUTOMATED    Final    Sodium 02/23/2022 139  136 - 145 mmol/L Final    Potassium 02/23/2022 3.8  3.5 - 5.1 mmol/L Final    Chloride 02/23/2022 107  97 - 108 mmol/L Final    CO2 02/23/2022 26  21 - 32 mmol/L Final    Anion gap 02/23/2022 6  5 - 15 mmol/L Final    Glucose 02/23/2022 102* 65 - 100 mg/dL Final    BUN 02/23/2022 14  6 - 20 MG/DL Final    Creatinine 02/23/2022 0.81  0.55 - 1.02 MG/DL Final    BUN/Creatinine ratio 02/23/2022 17  12 - 20   Final    GFR est AA 02/23/2022 >60  >60 ml/min/1.73m2 Final    GFR est non-AA 02/23/2022 >60  >60 ml/min/1.73m2 Final    Estimated GFR is calculated using the IDMS-traceable Modification of Diet in Renal Disease (MDRD) Study equation, reported for both  Americans (GFRAA) and non- Americans (GFRNA), and normalized to 1.73m2 body surface area. The physician must decide which value applies to the patient.  Calcium 02/23/2022 9.8  8.5 - 10.1 MG/DL Final    Bilirubin, total 02/23/2022 0.6  0.2 - 1.0 MG/DL Final    ALT (SGPT) 02/23/2022 33  12 - 78 U/L Final    AST (SGOT) 02/23/2022 17  15 - 37 U/L Final    Alk.  phosphatase 02/23/2022 120* 45 - 117 U/L Final    Protein, total 02/23/2022 7.5  6.4 - 8.2 g/dL Final    Albumin 02/23/2022 3.9  3.5 - 5.0 g/dL Final    Globulin 02/23/2022 3.6  2.0 - 4.0 g/dL Final    A-G Ratio 02/23/2022 1.1  1.1 - 2.2   Final   Office Visit on 11/30/2021   Component Date Value Ref Range Status    Glucose 11/30/2021 107* 65 - 99 mg/dL Final    BUN 11/30/2021 13  8 - 27 mg/dL Final    Creatinine 11/30/2021 0.74  0.57 - 1.00 mg/dL Final    GFR est non-AA 11/30/2021 86  >59 mL/min/1.73 Final    GFR est AA 11/30/2021 99  >59 mL/min/1.73 Final    Comment: **In accordance with recommendations from the NKF-ASN Task force,**    [facility] is in the process of updating its eGFR calculation to the    2021 CKD-EPI creatinine equation that estimates kidney function    without a race variable.  BUN/Creatinine ratio 11/30/2021 18  12 - 28 Final    Sodium 11/30/2021 145* 134 - 144 mmol/L Final    Potassium 11/30/2021 4.0  3.5 - 5.2 mmol/L Final    Chloride 11/30/2021 108* 96 - 106 mmol/L Final    CO2 11/30/2021 25  20 - 29 mmol/L Final    Calcium 11/30/2021 9.4  8.7 - 10.3 mg/dL Final    Protein, total 11/30/2021 6.4  6.0 - 8.5 g/dL Final    Albumin 11/30/2021 4.2  3.8 - 4.8 g/dL Final    GLOBULIN, TOTAL 11/30/2021 2.2  1.5 - 4.5 g/dL Final    A-G Ratio 11/30/2021 1.9  1.2 - 2.2 Final    Bilirubin, total 11/30/2021 0.5  0.0 - 1.2 mg/dL Final    Alk.  phosphatase 11/30/2021 128* 44 - 121 IU/L Final                  **Please note reference interval change**    AST (SGOT) 11/30/2021 26  0 - 40 IU/L Final    ALT (SGPT) 11/30/2021 41* 0 - 32 IU/L Final    WBC 11/30/2021 5.3  3.4 - 10.8 x10E3/uL Final    RBC 11/30/2021 4.79  3.77 - 5.28 x10E6/uL Final    HGB 11/30/2021 15.2  11.1 - 15.9 g/dL Final    HCT 11/30/2021 45.0  34.0 - 46.6 % Final    MCV 11/30/2021 94  79 - 97 fL Final    MCH 11/30/2021 31.7  26.6 - 33.0 pg Final    MCHC 11/30/2021 33.8  31.5 - 35.7 g/dL Final    RDW 11/30/2021 12.1  11.7 - 15.4 % Final    PLATELET 06/80/0881 437  150 - 450 x10E3/uL Final    Cholesterol, total 11/30/2021 209* 100 - 199 mg/dL Final    Triglyceride 11/30/2021 149  0 - 149 mg/dL Final    HDL Cholesterol 11/30/2021 44  >39 mg/dL Final    VLDL, calculated 11/30/2021 27  5 - 40 mg/dL Final  LDL, calculated 11/30/2021 138* 0 - 99 mg/dL Final    TSH 11/30/2021 3.130  0.450 - 4.500 uIU/mL Final    Occult blood fecal, by IA 11/30/2021 Negative  Negative Final      Review of Systems   Constitutional: Negative for activity change, fatigue and unexpected weight change. HENT: Negative for congestion, hearing loss, rhinorrhea and sore throat. Eyes: Negative for discharge. Respiratory: Negative for cough, chest tightness and shortness of breath. Cardiovascular: Negative for leg swelling. Gastrointestinal: Positive for abdominal pain (umbilical pain). Negative for constipation and diarrhea. Genitourinary: Negative for dysuria, flank pain, frequency and urgency. Musculoskeletal: Negative for arthralgias, back pain and myalgias. Skin: Negative for color change and rash. Neurological: Negative for dizziness, light-headedness and headaches. Psychiatric/Behavioral: Negative for dysphoric mood and sleep disturbance. The patient is not nervous/anxious. Visit Vitals  /76 (BP 1 Location: Left arm)   Pulse 68   Temp 97.5 °F (36.4 °C)   Resp 15   Ht 5' 5\" (1.651 m)   Wt 185 lb (83.9 kg)   SpO2 97%   BMI 30.79 kg/m²      Physical Exam  Vitals and nursing note reviewed. Constitutional:       General: She is not in acute distress. Appearance: Normal appearance. She is well-developed. She is not diaphoretic. HENT:      Right Ear: External ear normal.      Left Ear: External ear normal.   Eyes:      General: No scleral icterus. Right eye: No discharge. Left eye: No discharge. Extraocular Movements: Extraocular movements intact. Conjunctiva/sclera: Conjunctivae normal.   Cardiovascular:      Rate and Rhythm: Normal rate and regular rhythm. Pulmonary:      Effort: Pulmonary effort is normal.      Breath sounds: Normal breath sounds. No wheezing. Abdominal:      General: Bowel sounds are normal.      Palpations: Abdomen is soft.  There is mass (umbilical abscess). Tenderness: There is no abdominal tenderness. Musculoskeletal:      Cervical back: Normal range of motion and neck supple. Lymphadenopathy:      Cervical: No cervical adenopathy. Neurological:      Mental Status: She is alert and oriented to person, place, and time. Psychiatric:         Mood and Affect: Mood and affect normal.         An electronic signature was used to authenticate this note.   -- Jose Enrique Silverman

## 2022-02-25 NOTE — PROGRESS NOTES
Chief Complaint   Patient presents with    Follow-up     ED for the umbillical absess       Visit Vitals  /76 (BP 1 Location: Left arm)   Pulse 68   Temp 97.5 °F (36.4 °C)   Resp 15   Ht 5' 5\" (1.651 m)   Wt 185 lb (83.9 kg)   SpO2 97%   BMI 30.79 kg/m²       1. Have you been to the ER, urgent care clinic since your last visit? Hospitalized since your last visit? Yes [facility]    2. Have you seen or consulted any other health care providers outside of the [address] [place] [address] since your last visit? Include any pap smears or colon screening.  No

## 2022-02-26 ENCOUNTER — APPOINTMENT (OUTPATIENT)
Dept: CT IMAGING | Age: 67
End: 2022-02-26
Attending: EMERGENCY MEDICINE
Payer: COMMERCIAL

## 2022-02-26 ENCOUNTER — HOSPITAL ENCOUNTER (EMERGENCY)
Age: 67
Discharge: HOME | End: 2022-02-26
Attending: EMERGENCY MEDICINE
Payer: COMMERCIAL

## 2022-02-26 VITALS
BODY MASS INDEX: 30.34 KG/M2 | RESPIRATION RATE: 20 BRPM | TEMPERATURE: 98.4 F | WEIGHT: 182.32 LBS | DIASTOLIC BLOOD PRESSURE: 69 MMHG | OXYGEN SATURATION: 98 % | HEART RATE: 66 BPM | SYSTOLIC BLOOD PRESSURE: 139 MMHG

## 2022-02-26 DIAGNOSIS — K59.03 DRUG-INDUCED CONSTIPATION: ICD-10-CM

## 2022-02-26 DIAGNOSIS — L02.216 ABSCESS, UMBILICAL: Primary | ICD-10-CM

## 2022-02-26 LAB
ALBUMIN SERPL-MCNC: 4 G/DL (ref 3.5–5)
ALBUMIN/GLOB SERPL: 1.1 {RATIO} (ref 1.1–2.2)
ALP SERPL-CCNC: 140 U/L (ref 45–117)
ALT SERPL-CCNC: 27 U/L (ref 12–78)
ANION GAP SERPL CALC-SCNC: 11 MMOL/L (ref 5–15)
APPEARANCE UR: CLEAR
AST SERPL-CCNC: 16 U/L (ref 15–37)
BACTERIA URNS QL MICRO: NEGATIVE /HPF
BASOPHILS # BLD: 0.1 K/UL (ref 0–0.1)
BASOPHILS NFR BLD: 1 % (ref 0–1)
BILIRUB SERPL-MCNC: 0.6 MG/DL (ref 0.2–1)
BILIRUB UR QL: NEGATIVE
BUN SERPL-MCNC: 8 MG/DL (ref 6–20)
BUN/CREAT SERPL: 10 (ref 12–20)
CALCIUM SERPL-MCNC: 9.1 MG/DL (ref 8.5–10.1)
CHLORIDE SERPL-SCNC: 99 MMOL/L (ref 97–108)
CO2 SERPL-SCNC: 27 MMOL/L (ref 21–32)
COLOR UR: ABNORMAL
COMMENT, HOLDF: NORMAL
CREAT SERPL-MCNC: 0.83 MG/DL (ref 0.55–1.02)
DIFFERENTIAL METHOD BLD: NORMAL
EOSINOPHIL # BLD: 0 K/UL (ref 0–0.4)
EOSINOPHIL NFR BLD: 1 % (ref 0–7)
EPITH CASTS URNS QL MICRO: ABNORMAL /LPF
ERYTHROCYTE [DISTWIDTH] IN BLOOD BY AUTOMATED COUNT: 12.1 % (ref 11.5–14.5)
GLOBULIN SER CALC-MCNC: 3.7 G/DL (ref 2–4)
GLUCOSE SERPL-MCNC: 106 MG/DL (ref 65–100)
GLUCOSE UR STRIP.AUTO-MCNC: NEGATIVE MG/DL
HCT VFR BLD AUTO: 45.8 % (ref 35–47)
HGB BLD-MCNC: 15.3 G/DL (ref 11.5–16)
HGB UR QL STRIP: ABNORMAL
HYALINE CASTS URNS QL MICRO: ABNORMAL /LPF (ref 0–5)
IMM GRANULOCYTES # BLD AUTO: 0 K/UL (ref 0–0.04)
IMM GRANULOCYTES NFR BLD AUTO: 0 % (ref 0–0.5)
KETONES UR QL STRIP.AUTO: NEGATIVE MG/DL
LEUKOCYTE ESTERASE UR QL STRIP.AUTO: NEGATIVE
LIPASE SERPL-CCNC: 148 U/L (ref 73–393)
LYMPHOCYTES # BLD: 1.6 K/UL (ref 0.8–3.5)
LYMPHOCYTES NFR BLD: 20 % (ref 12–49)
MCH RBC QN AUTO: 31.1 PG (ref 26–34)
MCHC RBC AUTO-ENTMCNC: 33.4 G/DL (ref 30–36.5)
MCV RBC AUTO: 93.1 FL (ref 80–99)
MONOCYTES # BLD: 0.5 K/UL (ref 0–1)
MONOCYTES NFR BLD: 7 % (ref 5–13)
MUCOUS THREADS URNS QL MICRO: ABNORMAL /LPF
NEUTS SEG # BLD: 5.9 K/UL (ref 1.8–8)
NEUTS SEG NFR BLD: 72 % (ref 32–75)
NITRITE UR QL STRIP.AUTO: NEGATIVE
NRBC # BLD: 0 K/UL (ref 0–0.01)
NRBC BLD-RTO: 0 PER 100 WBC
PH UR STRIP: 6.5 [PH] (ref 5–8)
PLATELET # BLD AUTO: 273 K/UL (ref 150–400)
PMV BLD AUTO: 10.7 FL (ref 8.9–12.9)
POTASSIUM SERPL-SCNC: 3.9 MMOL/L (ref 3.5–5.1)
PROT SERPL-MCNC: 7.7 G/DL (ref 6.4–8.2)
PROT UR STRIP-MCNC: NEGATIVE MG/DL
RBC # BLD AUTO: 4.92 M/UL (ref 3.8–5.2)
RBC #/AREA URNS HPF: ABNORMAL /HPF (ref 0–5)
SAMPLES BEING HELD,HOLD: NORMAL
SODIUM SERPL-SCNC: 137 MMOL/L (ref 136–145)
SP GR UR REFRACTOMETRY: 1.01 (ref 1–1.03)
UR CULT HOLD, URHOLD: NORMAL
UROBILINOGEN UR QL STRIP.AUTO: 1 EU/DL (ref 0.2–1)
WBC # BLD AUTO: 8.2 K/UL (ref 3.6–11)
WBC URNS QL MICRO: ABNORMAL /HPF (ref 0–4)

## 2022-02-26 PROCEDURE — 81001 URINALYSIS AUTO W/SCOPE: CPT

## 2022-02-26 PROCEDURE — 74011000250 HC RX REV CODE- 250: Performed by: EMERGENCY MEDICINE

## 2022-02-26 PROCEDURE — 83690 ASSAY OF LIPASE: CPT

## 2022-02-26 PROCEDURE — 74011250636 HC RX REV CODE- 250/636: Performed by: EMERGENCY MEDICINE

## 2022-02-26 PROCEDURE — 74177 CT ABD & PELVIS W/CONTRAST: CPT

## 2022-02-26 PROCEDURE — 75810000289 HC I&D ABSCESS SIMP/COMP/MULT

## 2022-02-26 PROCEDURE — 74011000636 HC RX REV CODE- 636: Performed by: EMERGENCY MEDICINE

## 2022-02-26 PROCEDURE — 80053 COMPREHEN METABOLIC PANEL: CPT

## 2022-02-26 PROCEDURE — 96374 THER/PROPH/DIAG INJ IV PUSH: CPT

## 2022-02-26 PROCEDURE — 36415 COLL VENOUS BLD VENIPUNCTURE: CPT

## 2022-02-26 PROCEDURE — 99285 EMERGENCY DEPT VISIT HI MDM: CPT

## 2022-02-26 PROCEDURE — 85025 COMPLETE CBC W/AUTO DIFF WBC: CPT

## 2022-02-26 RX ORDER — LIDOCAINE HYDROCHLORIDE AND EPINEPHRINE 10; 10 MG/ML; UG/ML
1.5 INJECTION, SOLUTION INFILTRATION; PERINEURAL
Status: COMPLETED | OUTPATIENT
Start: 2022-02-26 | End: 2022-02-26

## 2022-02-26 RX ORDER — BACITRACIN 500 UNIT/G
1 PACKET (EA) TOPICAL
Status: COMPLETED | OUTPATIENT
Start: 2022-02-26 | End: 2022-02-26

## 2022-02-26 RX ORDER — HYDROMORPHONE HYDROCHLORIDE 1 MG/ML
0.5 INJECTION, SOLUTION INTRAMUSCULAR; INTRAVENOUS; SUBCUTANEOUS ONCE
Status: COMPLETED | OUTPATIENT
Start: 2022-02-26 | End: 2022-02-26

## 2022-02-26 RX ORDER — POLYETHYLENE GLYCOL 3350 17 G/17G
17 POWDER, FOR SOLUTION ORAL SEE ADMIN INSTRUCTIONS
Qty: 289 G | Refills: 0 | Status: SHIPPED | OUTPATIENT
Start: 2022-02-26 | End: 2022-05-16

## 2022-02-26 RX ADMIN — LIDOCAINE HYDROCHLORIDE AND EPINEPHRINE 15 MG: 10; 10 INJECTION, SOLUTION INFILTRATION; PERINEURAL at 10:00

## 2022-02-26 RX ADMIN — IOPAMIDOL 100 ML: 755 INJECTION, SOLUTION INTRAVENOUS at 08:52

## 2022-02-26 RX ADMIN — HYDROMORPHONE HYDROCHLORIDE 0.5 MG: 1 INJECTION, SOLUTION INTRAMUSCULAR; INTRAVENOUS; SUBCUTANEOUS at 08:27

## 2022-02-26 RX ADMIN — BACITRACIN 1 PACKET: 500 OINTMENT TOPICAL at 10:50

## 2022-02-26 NOTE — ED PROVIDER NOTES
81-year-old female recently diagnosed with umbilical abscess on CT scan on 2/23, and was taking Bactrim at that time, returns to the emergency department noting worsening of abscess with increasing pain. She was seen by her primary care provider yesterday who prescribed clindamycin which she has taken 1 day of. She states that she has an appointment with general surgery on Monday. She denies any, chills, nausea, vomiting, diarrhea, but she has been feeling constipated since starting the pain medication. No dysuria or hematuria.            Past Medical History:   Diagnosis Date    Cancer [facility]) 2005    breast (right)    Ill-defined condition     migraines    Lipoma 11/18/2013       Past Surgical History:   Procedure Laterality Date    HX BREAST LUMPECTOMY      s/p RTx (x7 weeks)    HX CHOLECYSTECTOMY  1997    HX COLONOSCOPY  2009 & 2/27/15    polyps 1st time, 2nd time normal - repeat 5 years - Dr. Pierre Samaniego HX UROLOGICAL  2013    sling         Family History:   Problem Relation Age of Onset    Hypertension Mother     Cancer Mother         lung, liver, metastatic    Diabetes Sister     Hypertension Sister     Hypertension Brother     Coronary Art Dis Father         since age 42's    Heart Attack Father     Heart Disease Father     Cancer Maternal Grandmother         UNKNOWN    Heart Disease Maternal Grandfather     Cancer Paternal Grandmother     Bleeding Prob Paternal Grandmother         stomach    Lung Disease Paternal Grandfather     Hypertension Brother     Anesth Problems Neg Hx        Social History     Socioeconomic History    Marital status:      Spouse name: Not on file    Number of children: Not on file    Years of education: Not on file    Highest education level: Not on file   Occupational History    Not on file   Tobacco Use    Smoking status: Former Smoker     Packs/day: 0.50     Years: 15.00     Pack years: 7.50     Quit date: 12/13/1993 Years since quittin.2    Smokeless tobacco: Never Used   Vaping Use    Vaping Use: Never used   Substance and Sexual Activity    Alcohol use: Not Currently     Comment: rarely    Drug use: No    Sexual activity: Yes     Partners: Male     Birth control/protection: None   Other Topics Concern    Not on file   Social History Narrative    Not on file     Social Determinants of Health     Financial Resource Strain:     Difficulty of Paying Living Expenses: Not on file   Food Insecurity:     Worried About Running Out of Food in the Last Year: Not on file    Smith of Food in the Last Year: Not on file   Transportation Needs:     Lack of Transportation (Medical): Not on file    Lack of Transportation (Non-Medical): Not on file   Physical Activity:     Days of Exercise per Week: Not on file    Minutes of Exercise per Session: Not on file   Stress:     Feeling of Stress : Not on file   Social Connections:     Frequency of Communication with Friends and Family: Not on file    Frequency of Social Gatherings with Friends and Family: Not on file    Attends Jewish Services: Not on file    Active Member of [address] [place] [address] or Organizations: Not on file    Attends Club or Organization Meetings: Not on file    Marital Status: Not on file   Intimate Partner Violence:     Fear of Current or Ex-Partner: Not on file    Emotionally Abused: Not on file    Physically Abused: Not on file    Sexually Abused: Not on file   Housing Stability:     Unable to Pay for Housing in the Last Year: Not on file    Number of [place] in the Last Year: Not on file    Unstable Housing in the Last Year: Not on file         ALLERGIES: Patient has no known allergies. Review of Systems   Constitutional: Negative for activity change, appetite change, chills and fever. HENT: Negative for congestion, rhinorrhea, sinus pressure, sneezing and sore throat. Eyes: Negative for photophobia and visual disturbance.    Respiratory: Negative for cough and shortness of breath. Cardiovascular: Negative for chest pain. Gastrointestinal: Positive for abdominal pain and constipation. Negative for blood in stool, diarrhea, nausea and vomiting. Genitourinary: Negative for difficulty urinating, dysuria, flank pain, frequency, hematuria, menstrual problem, urgency, vaginal bleeding and vaginal discharge. Musculoskeletal: Negative for arthralgias, back pain, myalgias and neck pain. Skin: Positive for rash. Negative for wound. Neurological: Negative for syncope, weakness, numbness and headaches. Psychiatric/Behavioral: Negative for self-injury and suicidal ideas. All other systems reviewed and are negative. Vitals:    02/26/22 0832 02/26/22 0902 02/26/22 0932 02/26/22 1002   BP:       Pulse:       Resp:       Temp:       SpO2: 95% 96% 95% 95%   Weight:                Physical Exam  Vitals and nursing note reviewed. Constitutional:       General: She is not in acute distress. Appearance: Normal appearance. She is well-developed. She is obese. She is not diaphoretic. HENT:      Head: Normocephalic and atraumatic. Nose: Nose normal.   Eyes:      Extraocular Movements: Extraocular movements intact. Conjunctiva/sclera: Conjunctivae normal.      Pupils: Pupils are equal, round, and reactive to light. Cardiovascular:      Rate and Rhythm: Normal rate and regular rhythm. Heart sounds: Normal heart sounds. Pulmonary:      Effort: Pulmonary effort is normal.      Breath sounds: Normal breath sounds. Abdominal:      General: There is no distension. Palpations: Abdomen is soft. Tenderness: There is no abdominal tenderness. Musculoskeletal:         General: No tenderness. Cervical back: Neck supple. Skin:     General: Skin is warm and dry. Neurological:      General: No focal deficit present. Mental Status: She is alert and oriented to person, place, and time. Cranial Nerves:  No cranial nerve deficit. Sensory: No sensory deficit. Motor: No weakness. Coordination: Coordination normal.          MDM   43-year-old female presents with umbilical abscess and concern for worsening despite p.o. Bactrim. She has been taking 1 day of clindamycin started yesterday as well. She is afebrile with vital signs stable. She is given pain medication with some improvement in her symptoms. UA negative  Labs returned reassuringly showing no significant abnormalities. CT abdomen pelvis shows umbilical abscess which appears to be similar in size to prior measure about 1.5 cm. No other acute intra-abdominal abnormalities other than new constipation, likely from pain medication which she has been taking. Case was discussed with Dr. Vicki Dumont, general surgery, who recommended bedside I&D and continuing the previously Rx'd ABX. Bedside I&D successful with copious purulent drainage noted. She was recommended sitz baths multiple times per day and keeping the wound clean and dressed with antibiotic ointment allowing it to heal by secondary intention. Recommended PCP follow-up for further evaluation and return precautions were given for worsening or concerns. This plan was discussed with the patient and her daughter at the bedside and they stated both understanding and agreement. Please note that this dictation was completed with DBVu, the computer voice recognition software. Quite often unanticipated grammatical, syntax, homophones, and other interpretive errors are inadvertently transcribed by the computer software. Please disregard these errors. Please excuse any errors that have escaped final proofreading.       I&D Abcess Simple    Date/Time: 2/26/2022 10:06 AM  Performed by: Burak Shine DO  Authorized by: Burak Shine DO     Consent:     Consent obtained:  Verbal    Consent given by:  Patient    Risks discussed:  Bleeding, incomplete drainage, pain, infection and damage to other organs    Alternatives discussed:  No treatment, delayed treatment and alternative treatment  Location:     Type:  Abscess    Size:  2    Location:  Trunk    Trunk location:  Abdomen  Pre-procedure details:     Skin preparation:  Betadine  Anesthesia (see MAR for exact dosages): Anesthesia method:  Local infiltration    Local anesthetic:  Lidocaine 1% WITH epi  Procedure type:     Complexity:  Simple  Procedure details:     Needle aspiration: no      Incision types:  Single straight    Incision depth:  Subcutaneous    Scalpel blade:  11    Wound management:  Probed and deloculated, irrigated with saline and extensive cleaning    Drainage:  Purulent    Drainage amount:  Copious    Wound treatment:  Wound left open    Packing materials:  None  Post-procedure details:     Patient tolerance of procedure:   Tolerated well, no immediate complications

## 2022-02-26 NOTE — ED NOTES
Pt ambulatory out of ED with discharge instructions and prescriptions in hand given by Dr. Fredi Bonilla; pt verbalized understanding of discharge paperwork and time allotted for questions. VSS. Pt alert and oriented. Pt accompanied by daughter.

## 2022-02-26 NOTE — ED TRIAGE NOTES
Triage: pt developed mid abd pain starting Friday. Pt went to see PCP Monday and given antibiotics and then seen at [facility] on Wednesday where she was told to continue antibiotics d/t abscess. Friday seen by PCP and was told abscess \"getting bigger\" and then given another antibiotics and referred to general surgery. General Surgery appointment Monday. Pt has continued worsening pain to area. Denies N/V/D, fever, rectal bleeding, chest pain or SOB.

## 2022-03-05 RX ORDER — CITALOPRAM 10 MG/1
TABLET ORAL
Qty: 90 TABLET | Refills: 0 | Status: SHIPPED | OUTPATIENT
Start: 2022-03-05 | End: 2022-05-30

## 2022-03-07 ENCOUNTER — TELEPHONE (OUTPATIENT)
Dept: SURGERY | Age: 67
End: 2022-03-07

## 2022-03-07 NOTE — TELEPHONE ENCOUNTER
Called and spoke with PT. PT verified / PT stated she went to the [facility] ER and stated that she no longer needed to see the surgeon.     Abscess, umbilical  Periumbilical abdominal pain  Referred BY: VISHNU CABA  Referred TO: Dr. Buckley Brunner

## 2022-03-09 ENCOUNTER — OFFICE VISIT (OUTPATIENT)
Dept: PRIMARY CARE CLINIC | Age: 67
End: 2022-03-09
Payer: COMMERCIAL

## 2022-03-09 VITALS
TEMPERATURE: 97.1 F | WEIGHT: 182.6 LBS | HEART RATE: 65 BPM | OXYGEN SATURATION: 98 % | RESPIRATION RATE: 15 BRPM | DIASTOLIC BLOOD PRESSURE: 81 MMHG | SYSTOLIC BLOOD PRESSURE: 131 MMHG | BODY MASS INDEX: 30.42 KG/M2 | HEIGHT: 65 IN

## 2022-03-09 DIAGNOSIS — G47.33 OBSTRUCTIVE SLEEP APNEA SYNDROME: ICD-10-CM

## 2022-03-09 DIAGNOSIS — L02.216 ABSCESS, UMBILICAL: Primary | ICD-10-CM

## 2022-03-09 PROCEDURE — 99213 OFFICE O/P EST LOW 20 MIN: CPT | Performed by: INTERNAL MEDICINE

## 2022-03-09 RX ORDER — DOXYCYCLINE 100 MG/1
100 CAPSULE ORAL 2 TIMES DAILY
Qty: 20 CAPSULE | Refills: 0 | Status: SHIPPED | OUTPATIENT
Start: 2022-03-09 | End: 2022-03-19

## 2022-03-09 NOTE — PROGRESS NOTES
Chief Complaint   Patient presents with    Follow-up     went to the ED again for the absess in the umbilical area       Visit Vitals  /81 (BP 1 Location: Left arm)   Pulse 65   Temp 97.1 °F (36.2 °C)   Resp 15   Ht 5' 5\" (1.651 m)   Wt 182 lb 9.6 oz (82.8 kg)   SpO2 98%   BMI 30.39 kg/m²       1. Have you been to the ER, urgent care clinic since your last visit? Hospitalized since your last visit? Yes [facility] for draining absess    2. Have you seen or consulted any other health care providers outside of the [address] [place] [address] since your last visit? Include any pap smears or colon screening. No      3. For patients aged 39-70: Has the patient had a colonoscopy / FIT/ Cologuard? Yes - no Care Gap present      If the patient is female:    4. For patients aged 41-77: Has the patient had a mammogram within the past 2 years? Yes - no Care Gap present      5. For patients aged 21-65: Has the patient had a pap smear?  No Seeing Dr Moulton Mo, states she did a pap last year- needing to call for them to fax records

## 2022-03-09 NOTE — PROGRESS NOTES
Jalen Paris (: 1955) is a 74 y.o. female, established patient, here for evaluation of the following chief complaint(s):  Follow-up (went to the ED again for the absess in the umbilical area)     Written by Hector Pate, as dictated by Dr. Michael Hall MD.      ASSESSMENT/PLAN:  Below is the assessment and plan developed based on review of pertinent history, physical exam, labs, studies, and medications. 1. Abscess, umbilical  Bedside I&D was done in the ED on 22. She still has a hard area under her umbilicus so I rx'd a course of doxycycline 100 mg BID x10 days. Potential side effects were discussed. -     doxycycline (VIBRAMYCIN) 100 mg capsule; Take 1 Capsule by mouth two (2) times a day for 10 days. , Normal, Disp-20 Capsule, R-0 sent to pharmacy. 2. Obstructive sleep apnea syndrome  Recommended she follow-up with Sleep Medicine once her umbilical abscess fully heals. SUBJECTIVE/OBJECTIVE:  HPI   The patient presents today for an ED follow-up. She was seen at [facility] ED on 22 and dx'd with an umbilical abscess. She was on Bactrim DS, Septra DS but switched to a course of clindamycin 300 mg at her follow-up on 22. She was also referred to Dr. Tory Logan (general surgery). She went back to the ED on 22 c/o worsening of abscess and pain and was seen at [place] ED. Bedside I&D was done at the ED and successful with copious purulent drainage noted. She has been keeping the wound clean. She uses an appliance for SIMRAN, but notes this needs to be replaced. She was told to get a repeat sleep study, but has not scheduled this yet. Patient Active Problem List   Diagnosis Code    History of breast cancer Z85.3    S/P cholecystectomy Z90.49    S/P tubal ligation Z98.51    H/O bone density study Z92.89    Obesity (BMI 30-39. 9) E66.9    Obstructive sleep apnea syndrome G47.33    Advance directive discussed with patient Z71.89    Fatty liver K76.0    Mixed hyperlipidemia E78.2        Current Outpatient Medications on File Prior to Visit   Medication Sig Dispense Refill    citalopram (CELEXA) 10 mg tablet TAKE 1 TABLET BY MOUTH EVERY DAY 90 Tablet 0    polyethylene glycol (Miralax) 17 gram/dose powder Take 17 g by mouth See Admin Instructions. In a 32oz gatorade (or similar beverage) bottle put 6-8 capfuls of miralax and drink throughout the day. Continue using miralax 1-2 capfuls daily until stool consistency of pudding. Hold for watery stools. 289 g 0    atorvastatin (LIPITOR) 10 mg tablet TAKE 1 TABLET BY MOUTH EVERY EVENING 90 Tablet 1    Biotin 2,500 mcg cap Take  by mouth.  vitamin e (E GEMS) 1,000 unit capsule Take 1,000 Units by mouth daily.  OMEPRAZOLE (PRILOSEC PO) Take  by mouth daily as needed.  CALCIUM CARBONATE/VITAMIN D3 (CALTRATE 600 + D PO) Take  by mouth daily.  CHOLECALCIFEROL, VITAMIN D3, (VITAMIN D3 PO) Take 1,000 Units by mouth daily.  cetirizine (ZYRTEC) 10 mg tablet Take  by mouth daily as needed. No current facility-administered medications on file prior to visit.        No Known Allergies    Past Medical History:   Diagnosis Date    Cancer [facility]) 2005    breast (right)    Ill-defined condition     migraines    Lipoma 11/18/2013       Past Surgical History:   Procedure Laterality Date    HX BREAST LUMPECTOMY      s/p RTx (x7 weeks)    HX CHOLECYSTECTOMY  1997    HX COLONOSCOPY  2009 & 2/27/15    polyps 1st time, 2nd time normal - repeat 5 years - Dr. Ana Pepper HX UROLOGICAL  2013    sling       Family History   Problem Relation Age of Onset    Hypertension Mother     Cancer Mother         lung, liver, metastatic    Diabetes Sister     Hypertension Sister     Hypertension Brother     Coronary Art Dis Father         since age 42's    Heart Attack Father     Heart Disease Father     Cancer Maternal Grandmother         UNKNOWN    Heart Disease Maternal Grandfather     Cancer Paternal Grandmother     Bleeding Prob Paternal Grandmother         stomach    Lung Disease Paternal Grandfather     Hypertension Brother     Anesth Problems Neg Hx        Social History     Socioeconomic History    Marital status:      Spouse name: Not on file    Number of children: Not on file    Years of education: Not on file    Highest education level: Not on file   Occupational History    Not on file   Tobacco Use    Smoking status: Former Smoker     Packs/day: 0.50     Years: 15.00     Pack years: 7.50     Quit date: 1993     Years since quittin.2    Smokeless tobacco: Never Used   Vaping Use    Vaping Use: Never used   Substance and Sexual Activity    Alcohol use: Not Currently     Comment: rarely    Drug use: No    Sexual activity: Yes     Partners: Male     Birth control/protection: None   Other Topics Concern    Not on file   Social History Narrative    Not on file       Admission on 2022, Discharged on 2022   Component Date Value Ref Range Status    SAMPLES BEING HELD 2022 RED, B/C   Final    COMMENT 2022 Add-on orders for these samples will be processed based on acceptable specimen integrity and analyte stability, which may vary by analyte.     Final    WBC 2022 8.2  3.6 - 11.0 K/uL Final    RBC 2022 4.92  3.80 - 5.20 M/uL Final    HGB 2022 15.3  11.5 - 16.0 g/dL Final    HCT 2022 45.8  35.0 - 47.0 % Final    MCV 2022 93.1  80.0 - 99.0 FL Final    MCH 2022 31.1  26.0 - 34.0 PG Final    MCHC 2022 33.4  30.0 - 36.5 g/dL Final    RDW 2022 12.1  11.5 - 14.5 % Final    PLATELET  854  150 - 400 K/uL Final    MPV 2022 10.7  8.9 - 12.9 FL Final    NRBC 2022 0.0  0  WBC Final    ABSOLUTE NRBC 2022 0.00  0.00 - 0.01 K/uL Final    NEUTROPHILS 2022 72  32 - 75 % Final    LYMPHOCYTES 2022 20  12 - 49 % Final    MONOCYTES 2022 7  5 - 13 % Final    EOSINOPHILS 02/26/2022 1  0 - 7 % Final    BASOPHILS 02/26/2022 1  0 - 1 % Final    IMMATURE GRANULOCYTES 02/26/2022 0  0.0 - 0.5 % Final    ABS. NEUTROPHILS 02/26/2022 5.9  1.8 - 8.0 K/UL Final    ABS. LYMPHOCYTES 02/26/2022 1.6  0.8 - 3.5 K/UL Final    ABS. MONOCYTES 02/26/2022 0.5  0.0 - 1.0 K/UL Final    ABS. EOSINOPHILS 02/26/2022 0.0  0.0 - 0.4 K/UL Final    ABS. BASOPHILS 02/26/2022 0.1  0.0 - 0.1 K/UL Final    ABS. IMM. GRANS. 02/26/2022 0.0  0.00 - 0.04 K/UL Final    DF 02/26/2022 AUTOMATED    Final    Sodium 02/26/2022 137  136 - 145 mmol/L Final    Potassium 02/26/2022 3.9  3.5 - 5.1 mmol/L Final    Chloride 02/26/2022 99  97 - 108 mmol/L Final    CO2 02/26/2022 27  21 - 32 mmol/L Final    Anion gap 02/26/2022 11  5 - 15 mmol/L Final    Glucose 02/26/2022 106* 65 - 100 mg/dL Final    BUN 02/26/2022 8  6 - 20 MG/DL Final    Creatinine 02/26/2022 0.83  0.55 - 1.02 MG/DL Final    BUN/Creatinine ratio 02/26/2022 10* 12 - 20   Final    GFR est AA 02/26/2022 >60  >60 ml/min/1.73m2 Final    GFR est non-AA 02/26/2022 >60  >60 ml/min/1.73m2 Final    Calcium 02/26/2022 9.1  8.5 - 10.1 MG/DL Final    Bilirubin, total 02/26/2022 0.6  0.2 - 1.0 MG/DL Final    ALT (SGPT) 02/26/2022 27  12 - 78 U/L Final    AST (SGOT) 02/26/2022 16  15 - 37 U/L Final    Alk.  phosphatase 02/26/2022 140* 45 - 117 U/L Final    Protein, total 02/26/2022 7.7  6.4 - 8.2 g/dL Final    Albumin 02/26/2022 4.0  3.5 - 5.0 g/dL Final    Globulin 02/26/2022 3.7  2.0 - 4.0 g/dL Final    A-G Ratio 02/26/2022 1.1  1.1 - 2.2   Final    Lipase 02/26/2022 148  73 - 393 U/L Final    Color 02/26/2022 YELLOW/STRAW    Final    Color Reference Range: Straw, Yellow or Dark Yellow    Appearance 02/26/2022 CLEAR  CLEAR   Final    Specific gravity 02/26/2022 1.015  1.003 - 1.030   Final    pH (UA) 02/26/2022 6.5  5.0 - 8.0   Final    Protein 02/26/2022 Negative  NEG mg/dL Final    Glucose 02/26/2022 Negative  NEG mg/dL Final    Ketone 02/26/2022 Negative  NEG mg/dL Final    Bilirubin 02/26/2022 Negative  NEG   Final    Blood 02/26/2022 TRACE* NEG   Final    Urobilinogen 02/26/2022 1.0  0.2 - 1.0 EU/dL Final    Nitrites 02/26/2022 Negative  NEG   Final    Leukocyte Esterase 02/26/2022 Negative  NEG   Final    WBC 02/26/2022 0-4  0 - 4 /hpf Final    RBC 02/26/2022 5-10  0 - 5 /hpf Final    Epithelial cells 02/26/2022 FEW  FEW /lpf Final    Epithelial cell category consists of squamous cells and /or transitional urothelial cells. Renal tubular cells, if present, are separately identified as such.  Bacteria 02/26/2022 Negative  NEG /hpf Final    Mucus 02/26/2022 TRACE* NEG /lpf Final    Hyaline cast 02/26/2022 10-20  0 - 5 /lpf Final    Urine culture hold 02/26/2022 Urine on hold in Microbiology dept for 2 days. If unpreserved urine is submitted, it cannot be used for addtional testing after 24 hours, recollection will be required. Final   Admission on 02/23/2022, Discharged on 02/23/2022   Component Date Value Ref Range Status    WBC 02/23/2022 9.5  3.6 - 11.0 K/uL Final    RBC 02/23/2022 4.86  3.80 - 5.20 M/uL Final    HGB 02/23/2022 15.3  11.5 - 16.0 g/dL Final    HCT 02/23/2022 45.1  35.0 - 47.0 % Final    MCV 02/23/2022 92.8  80.0 - 99.0 FL Final    MCH 02/23/2022 31.5  26.0 - 34.0 PG Final    MCHC 02/23/2022 33.9  30.0 - 36.5 g/dL Final    RDW 02/23/2022 12.2  11.5 - 14.5 % Final    PLATELET 80/71/9840 905  150 - 400 K/uL Final    MPV 02/23/2022 10.7  8.9 - 12.9 FL Final    NRBC 02/23/2022 0.0  0  WBC Final    ABSOLUTE NRBC 02/23/2022 0.00  0.00 - 0.01 K/uL Final    NEUTROPHILS 02/23/2022 71  32 - 75 % Final    LYMPHOCYTES 02/23/2022 23  12 - 49 % Final    MONOCYTES 02/23/2022 6  5 - 13 % Final    EOSINOPHILS 02/23/2022 0  0 - 7 % Final    BASOPHILS 02/23/2022 0  0 - 1 % Final    IMMATURE GRANULOCYTES 02/23/2022 0  0.0 - 0.5 % Final    ABS.  NEUTROPHILS 02/23/2022 6.6  1.8 - 8.0 K/UL Final    ABS. LYMPHOCYTES 02/23/2022 2.2  0.8 - 3.5 K/UL Final    ABS. MONOCYTES 02/23/2022 0.6  0.0 - 1.0 K/UL Final    ABS. EOSINOPHILS 02/23/2022 0.0  0.0 - 0.4 K/UL Final    ABS. BASOPHILS 02/23/2022 0.0  0.0 - 0.1 K/UL Final    ABS. IMM. GRANS. 02/23/2022 0.0  0.00 - 0.04 K/UL Final    DF 02/23/2022 AUTOMATED    Final    Sodium 02/23/2022 139  136 - 145 mmol/L Final    Potassium 02/23/2022 3.8  3.5 - 5.1 mmol/L Final    Chloride 02/23/2022 107  97 - 108 mmol/L Final    CO2 02/23/2022 26  21 - 32 mmol/L Final    Anion gap 02/23/2022 6  5 - 15 mmol/L Final    Glucose 02/23/2022 102* 65 - 100 mg/dL Final    BUN 02/23/2022 14  6 - 20 MG/DL Final    Creatinine 02/23/2022 0.81  0.55 - 1.02 MG/DL Final    BUN/Creatinine ratio 02/23/2022 17  12 - 20   Final    GFR est AA 02/23/2022 >60  >60 ml/min/1.73m2 Final    GFR est non-AA 02/23/2022 >60  >60 ml/min/1.73m2 Final    Estimated GFR is calculated using the IDMS-traceable Modification of Diet in Renal Disease (MDRD) Study equation, reported for both  Americans (GFRAA) and non- Americans (GFRNA), and normalized to 1.73m2 body surface area. The physician must decide which value applies to the patient.  Calcium 02/23/2022 9.8  8.5 - 10.1 MG/DL Final    Bilirubin, total 02/23/2022 0.6  0.2 - 1.0 MG/DL Final    ALT (SGPT) 02/23/2022 33  12 - 78 U/L Final    AST (SGOT) 02/23/2022 17  15 - 37 U/L Final    Alk. phosphatase 02/23/2022 120* 45 - 117 U/L Final    Protein, total 02/23/2022 7.5  6.4 - 8.2 g/dL Final    Albumin 02/23/2022 3.9  3.5 - 5.0 g/dL Final    Globulin 02/23/2022 3.6  2.0 - 4.0 g/dL Final    A-G Ratio 02/23/2022 1.1  1.1 - 2.2   Final      Review of Systems   Constitutional: Negative for activity change, fatigue and unexpected weight change. HENT: Negative for congestion, hearing loss, rhinorrhea and sore throat. Eyes: Negative for discharge.    Respiratory: Negative for cough, chest tightness and shortness of breath. Cardiovascular: Negative for leg swelling. Gastrointestinal: Negative for abdominal pain, constipation and diarrhea. Genitourinary: Negative for dysuria, flank pain, frequency and urgency. Musculoskeletal: Negative for arthralgias, back pain and myalgias. Skin: Negative for color change and rash. Neurological: Negative for dizziness, light-headedness and headaches. Psychiatric/Behavioral: Negative for dysphoric mood and sleep disturbance. The patient is not nervous/anxious. Visit Vitals  /81 (BP 1 Location: Left arm)   Pulse 65   Temp 97.1 °F (36.2 °C)   Resp 15   Ht 5' 5\" (1.651 m)   Wt 182 lb 9.6 oz (82.8 kg)   SpO2 98%   BMI 30.39 kg/m²      Physical Exam  Vitals and nursing note reviewed. Constitutional:       General: She is not in acute distress. Appearance: Normal appearance. She is well-developed. She is not diaphoretic. HENT:      Right Ear: External ear normal.      Left Ear: External ear normal.   Eyes:      General: No scleral icterus. Right eye: No discharge. Left eye: No discharge. Extraocular Movements: Extraocular movements intact. Conjunctiva/sclera: Conjunctivae normal.   Cardiovascular:      Rate and Rhythm: Normal rate and regular rhythm. Pulmonary:      Effort: Pulmonary effort is normal.      Breath sounds: Normal breath sounds. No wheezing. Abdominal:      General: Bowel sounds are normal.      Palpations: Abdomen is soft. Tenderness: There is no abdominal tenderness. Musculoskeletal:      Cervical back: Normal range of motion and neck supple. Lymphadenopathy:      Cervical: No cervical adenopathy. Neurological:      Mental Status: She is alert and oriented to person, place, and time. Psychiatric:         Mood and Affect: Mood and affect normal.         An electronic signature was used to authenticate this note.   -- Ashley Dominguez

## 2022-03-19 PROBLEM — G47.33 OBSTRUCTIVE SLEEP APNEA SYNDROME: Status: ACTIVE | Noted: 2017-05-02

## 2022-03-19 PROBLEM — K76.0 FATTY LIVER: Status: ACTIVE | Noted: 2018-05-31

## 2022-03-19 PROBLEM — E78.2 MIXED HYPERLIPIDEMIA: Status: ACTIVE | Noted: 2021-11-30

## 2022-03-19 PROBLEM — E66.9 OBESITY (BMI 30-39.9): Status: ACTIVE | Noted: 2017-02-28

## 2022-03-19 PROBLEM — Z71.89 ADVANCE DIRECTIVE DISCUSSED WITH PATIENT: Status: ACTIVE | Noted: 2017-06-28

## 2022-03-25 ENCOUNTER — OFFICE VISIT (OUTPATIENT)
Dept: PRIMARY CARE CLINIC | Age: 67
End: 2022-03-25
Payer: COMMERCIAL

## 2022-03-25 VITALS
OXYGEN SATURATION: 96 % | SYSTOLIC BLOOD PRESSURE: 146 MMHG | BODY MASS INDEX: 30.22 KG/M2 | DIASTOLIC BLOOD PRESSURE: 94 MMHG | TEMPERATURE: 97.3 F | HEIGHT: 65 IN | WEIGHT: 181.4 LBS | RESPIRATION RATE: 15 BRPM | HEART RATE: 66 BPM

## 2022-03-25 DIAGNOSIS — R03.0 ELEVATED BLOOD-PRESSURE READING WITHOUT DIAGNOSIS OF HYPERTENSION: ICD-10-CM

## 2022-03-25 DIAGNOSIS — L02.216 ABSCESS, UMBILICAL: Primary | ICD-10-CM

## 2022-03-25 PROCEDURE — 99214 OFFICE O/P EST MOD 30 MIN: CPT | Performed by: INTERNAL MEDICINE

## 2022-03-25 RX ORDER — DOXYCYCLINE 100 MG/1
100 CAPSULE ORAL 2 TIMES DAILY
Qty: 28 CAPSULE | Refills: 0 | Status: SHIPPED | OUTPATIENT
Start: 2022-03-25 | End: 2022-03-25 | Stop reason: SDUPTHER

## 2022-03-25 RX ORDER — DOXYCYCLINE 100 MG/1
100 CAPSULE ORAL 2 TIMES DAILY
Qty: 28 CAPSULE | Refills: 0 | Status: SHIPPED | OUTPATIENT
Start: 2022-03-25 | End: 2022-04-08

## 2022-03-25 NOTE — PROGRESS NOTES
Jalen Liao (: 1955) is a 74 y.o. female, established patient, here for evaluation of the following chief complaint(s):  Skin Problem     Written by Shyanne Park, as dictated by Dr. Juan Manuel Swift MD.      ASSESSMENT/PLAN:  Below is the assessment and plan developed based on review of pertinent history, physical exam, labs, studies, and medications. 1. Abscess, umbilical  I would like her to see General Surgery, but she will be leaving for [facility] due to a family emergency on Monday. I rx'd a course of doxycycline 100 mg BID x 14 days. Potential side effects were discussed. I provided her with a referral to Dr. Tim Tomlinson (general surgery) to see once she gets back form [facility]. -     REFERRAL TO GENERAL SURGERY  -     doxycycline (VIBRAMYCIN) 100 mg capsule; Take 1 Capsule by mouth two (2) times a day for 14 days. , Print, Disp-28 Capsule, R-0 sent to pharmacy. 2. Elevated blood-pressure reading without diagnosis of hypertension  I instructed patient to check her BP at home, checking at different times of the day. She should keep a log of her readings and send it to me through [address] SUBJECTIVE/OBJECTIVE:  HPI   The patient presents today c/o a pinching, itching discomfort in her umbilical area. She was last seen on 22 after bedside I&D at the ED for umbilical abscess and was rx'd a course of doxycycline 100 mg BID x10 days. She has completed the course of doxycycline and started having symptoms after she finished antibiotics. She has also noticed a hard spot and throbbing pain in her umbilical area similar to when she first noticed the umbilical abscess. Of note, she will be traveling to [facility] on Monday for a family emergency and will not be back until 22. Her BP today is elevated at 151/78, 146/94 on manual repeat. She does not check her BP at home. She sleeps well at night, but mentions she has been stressed recently with her recurrent abscess and having to travel. Patient Active Problem List   Diagnosis Code    History of breast cancer Z85.3    S/P cholecystectomy Z90.49    S/P tubal ligation Z98.51    H/O bone density study Z92.89    Obesity (BMI 30-39. 9) E66.9    Obstructive sleep apnea syndrome G47.33    Advance directive discussed with patient Z71.89    Fatty liver K76.0    Mixed hyperlipidemia E78.2        Current Outpatient Medications on File Prior to Visit   Medication Sig Dispense Refill    citalopram (CELEXA) 10 mg tablet TAKE 1 TABLET BY MOUTH EVERY DAY 90 Tablet 0    polyethylene glycol (Miralax) 17 gram/dose powder Take 17 g by mouth See Admin Instructions. In a 32oz gatorade (or similar beverage) bottle put 6-8 capfuls of miralax and drink throughout the day. Continue using miralax 1-2 capfuls daily until stool consistency of pudding. Hold for watery stools. 289 g 0    atorvastatin (LIPITOR) 10 mg tablet TAKE 1 TABLET BY MOUTH EVERY EVENING 90 Tablet 1    vitamin e (E GEMS) 1,000 unit capsule Take 1,000 Units by mouth daily.  OMEPRAZOLE (PRILOSEC PO) Take  by mouth daily as needed.  CALCIUM CARBONATE/VITAMIN D3 (CALTRATE 600 + D PO) Take  by mouth daily.  CHOLECALCIFEROL, VITAMIN D3, (VITAMIN D3 PO) Take 1,000 Units by mouth daily.  cetirizine (ZYRTEC) 10 mg tablet Take  by mouth daily as needed.  Biotin 2,500 mcg cap Take  by mouth. (Patient not taking: Reported on 3/25/2022)       No current facility-administered medications on file prior to visit.        No Known Allergies    Past Medical History:   Diagnosis Date    Cancer [facility]) 2005    breast (right)    Ill-defined condition     migraines    Lipoma 11/18/2013       Past Surgical History:   Procedure Laterality Date    HX BREAST LUMPECTOMY      s/p RTx (x7 weeks)    HX CHOLECYSTECTOMY  1997    HX COLONOSCOPY  2009 & 2/27/15    polyps 1st time, 2nd time normal - repeat 5 years - Dr. Earl Parsons HX UROLOGICAL  2013    sling Family History   Problem Relation Age of Onset    Hypertension Mother     Cancer Mother         lung, liver, metastatic    Diabetes Sister     Hypertension Sister     Hypertension Brother     Coronary Art Dis Father         since age 42's    Heart Attack Father     Heart Disease Father     Cancer Maternal Grandmother         UNKNOWN    Heart Disease Maternal Grandfather     Cancer Paternal Grandmother     Bleeding Prob Paternal Grandmother         stomach    Lung Disease Paternal Grandfather     Hypertension Brother     Anesth Problems Neg Hx        Social History     Socioeconomic History    Marital status:      Spouse name: Not on file    Number of children: Not on file    Years of education: Not on file    Highest education level: Not on file   Occupational History    Not on file   Tobacco Use    Smoking status: Former Smoker     Packs/day: 0.50     Years: 15.00     Pack years: 7.50     Quit date: 1993     Years since quittin.2    Smokeless tobacco: Never Used   Vaping Use    Vaping Use: Never used   Substance and Sexual Activity    Alcohol use: Not Currently     Comment: rarely    Drug use: No    Sexual activity: Yes     Partners: Male     Birth control/protection: None   Other Topics Concern    Not on file   Social History Narrative    Not on file       Admission on 2022, Discharged on 2022   Component Date Value Ref Range Status    SAMPLES BEING HELD 2022 RED, B/C   Final    COMMENT 2022 Add-on orders for these samples will be processed based on acceptable specimen integrity and analyte stability, which may vary by analyte.     Final    WBC 2022 8.2  3.6 - 11.0 K/uL Final    RBC 2022 4.92  3.80 - 5.20 M/uL Final    HGB 2022 15.3  11.5 - 16.0 g/dL Final    HCT 2022 45.8  35.0 - 47.0 % Final    MCV 2022 93.1  80.0 - 99.0 FL Final    MCH 2022 31.1  26.0 - 34.0 PG Final    MCHC 2022 33.4 30.0 - 36.5 g/dL Final    RDW 02/26/2022 12.1  11.5 - 14.5 % Final    PLATELET 46/58/8334 351  150 - 400 K/uL Final    MPV 02/26/2022 10.7  8.9 - 12.9 FL Final    NRBC 02/26/2022 0.0  0  WBC Final    ABSOLUTE NRBC 02/26/2022 0.00  0.00 - 0.01 K/uL Final    NEUTROPHILS 02/26/2022 72  32 - 75 % Final    LYMPHOCYTES 02/26/2022 20  12 - 49 % Final    MONOCYTES 02/26/2022 7  5 - 13 % Final    EOSINOPHILS 02/26/2022 1  0 - 7 % Final    BASOPHILS 02/26/2022 1  0 - 1 % Final    IMMATURE GRANULOCYTES 02/26/2022 0  0.0 - 0.5 % Final    ABS. NEUTROPHILS 02/26/2022 5.9  1.8 - 8.0 K/UL Final    ABS. LYMPHOCYTES 02/26/2022 1.6  0.8 - 3.5 K/UL Final    ABS. MONOCYTES 02/26/2022 0.5  0.0 - 1.0 K/UL Final    ABS. EOSINOPHILS 02/26/2022 0.0  0.0 - 0.4 K/UL Final    ABS. BASOPHILS 02/26/2022 0.1  0.0 - 0.1 K/UL Final    ABS. IMM. GRANS. 02/26/2022 0.0  0.00 - 0.04 K/UL Final    DF 02/26/2022 AUTOMATED    Final    Sodium 02/26/2022 137  136 - 145 mmol/L Final    Potassium 02/26/2022 3.9  3.5 - 5.1 mmol/L Final    Chloride 02/26/2022 99  97 - 108 mmol/L Final    CO2 02/26/2022 27  21 - 32 mmol/L Final    Anion gap 02/26/2022 11  5 - 15 mmol/L Final    Glucose 02/26/2022 106* 65 - 100 mg/dL Final    BUN 02/26/2022 8  6 - 20 MG/DL Final    Creatinine 02/26/2022 0.83  0.55 - 1.02 MG/DL Final    BUN/Creatinine ratio 02/26/2022 10* 12 - 20   Final    GFR est AA 02/26/2022 >60  >60 ml/min/1.73m2 Final    GFR est non-AA 02/26/2022 >60  >60 ml/min/1.73m2 Final    Calcium 02/26/2022 9.1  8.5 - 10.1 MG/DL Final    Bilirubin, total 02/26/2022 0.6  0.2 - 1.0 MG/DL Final    ALT (SGPT) 02/26/2022 27  12 - 78 U/L Final    AST (SGOT) 02/26/2022 16  15 - 37 U/L Final    Alk.  phosphatase 02/26/2022 140* 45 - 117 U/L Final    Protein, total 02/26/2022 7.7  6.4 - 8.2 g/dL Final    Albumin 02/26/2022 4.0  3.5 - 5.0 g/dL Final    Globulin 02/26/2022 3.7  2.0 - 4.0 g/dL Final    A-G Ratio 02/26/2022 1.1  1.1 - 2.2 Final    Lipase 02/26/2022 148  73 - 393 U/L Final    Color 02/26/2022 YELLOW/STRAW    Final    Color Reference Range: Straw, Yellow or Dark Yellow    Appearance 02/26/2022 CLEAR  CLEAR   Final    Specific gravity 02/26/2022 1.015  1.003 - 1.030   Final    pH (UA) 02/26/2022 6.5  5.0 - 8.0   Final    Protein 02/26/2022 Negative  NEG mg/dL Final    Glucose 02/26/2022 Negative  NEG mg/dL Final    Ketone 02/26/2022 Negative  NEG mg/dL Final    Bilirubin 02/26/2022 Negative  NEG   Final    Blood 02/26/2022 TRACE* NEG   Final    Urobilinogen 02/26/2022 1.0  0.2 - 1.0 EU/dL Final    Nitrites 02/26/2022 Negative  NEG   Final    Leukocyte Esterase 02/26/2022 Negative  NEG   Final    WBC 02/26/2022 0-4  0 - 4 /hpf Final    RBC 02/26/2022 5-10  0 - 5 /hpf Final    Epithelial cells 02/26/2022 FEW  FEW /lpf Final    Epithelial cell category consists of squamous cells and /or transitional urothelial cells. Renal tubular cells, if present, are separately identified as such.  Bacteria 02/26/2022 Negative  NEG /hpf Final    Mucus 02/26/2022 TRACE* NEG /lpf Final    Hyaline cast 02/26/2022 10-20  0 - 5 /lpf Final    Urine culture hold 02/26/2022 Urine on hold in Microbiology dept for 2 days. If unpreserved urine is submitted, it cannot be used for addtional testing after 24 hours, recollection will be required.     Final   Admission on 02/23/2022, Discharged on 02/23/2022   Component Date Value Ref Range Status    WBC 02/23/2022 9.5  3.6 - 11.0 K/uL Final    RBC 02/23/2022 4.86  3.80 - 5.20 M/uL Final    HGB 02/23/2022 15.3  11.5 - 16.0 g/dL Final    HCT 02/23/2022 45.1  35.0 - 47.0 % Final    MCV 02/23/2022 92.8  80.0 - 99.0 FL Final    MCH 02/23/2022 31.5  26.0 - 34.0 PG Final    MCHC 02/23/2022 33.9  30.0 - 36.5 g/dL Final    RDW 02/23/2022 12.2  11.5 - 14.5 % Final    PLATELET 91/22/8968 199  150 - 400 K/uL Final    MPV 02/23/2022 10.7  8.9 - 12.9 FL Final    NRBC 02/23/2022 0.0  0  WBC Final    ABSOLUTE NRBC 02/23/2022 0.00  0.00 - 0.01 K/uL Final    NEUTROPHILS 02/23/2022 71  32 - 75 % Final    LYMPHOCYTES 02/23/2022 23  12 - 49 % Final    MONOCYTES 02/23/2022 6  5 - 13 % Final    EOSINOPHILS 02/23/2022 0  0 - 7 % Final    BASOPHILS 02/23/2022 0  0 - 1 % Final    IMMATURE GRANULOCYTES 02/23/2022 0  0.0 - 0.5 % Final    ABS. NEUTROPHILS 02/23/2022 6.6  1.8 - 8.0 K/UL Final    ABS. LYMPHOCYTES 02/23/2022 2.2  0.8 - 3.5 K/UL Final    ABS. MONOCYTES 02/23/2022 0.6  0.0 - 1.0 K/UL Final    ABS. EOSINOPHILS 02/23/2022 0.0  0.0 - 0.4 K/UL Final    ABS. BASOPHILS 02/23/2022 0.0  0.0 - 0.1 K/UL Final    ABS. IMM. GRANS. 02/23/2022 0.0  0.00 - 0.04 K/UL Final    DF 02/23/2022 AUTOMATED    Final    Sodium 02/23/2022 139  136 - 145 mmol/L Final    Potassium 02/23/2022 3.8  3.5 - 5.1 mmol/L Final    Chloride 02/23/2022 107  97 - 108 mmol/L Final    CO2 02/23/2022 26  21 - 32 mmol/L Final    Anion gap 02/23/2022 6  5 - 15 mmol/L Final    Glucose 02/23/2022 102* 65 - 100 mg/dL Final    BUN 02/23/2022 14  6 - 20 MG/DL Final    Creatinine 02/23/2022 0.81  0.55 - 1.02 MG/DL Final    BUN/Creatinine ratio 02/23/2022 17  12 - 20   Final    GFR est AA 02/23/2022 >60  >60 ml/min/1.73m2 Final    GFR est non-AA 02/23/2022 >60  >60 ml/min/1.73m2 Final    Estimated GFR is calculated using the IDMS-traceable Modification of Diet in Renal Disease (MDRD) Study equation, reported for both  Americans (GFRAA) and non- Americans (GFRNA), and normalized to 1.73m2 body surface area. The physician must decide which value applies to the patient.  Calcium 02/23/2022 9.8  8.5 - 10.1 MG/DL Final    Bilirubin, total 02/23/2022 0.6  0.2 - 1.0 MG/DL Final    ALT (SGPT) 02/23/2022 33  12 - 78 U/L Final    AST (SGOT) 02/23/2022 17  15 - 37 U/L Final    Alk.  phosphatase 02/23/2022 120* 45 - 117 U/L Final    Protein, total 02/23/2022 7.5  6.4 - 8.2 g/dL Final    Albumin 02/23/2022 3.9  3.5 - 5.0 g/dL Final    Globulin 02/23/2022 3.6  2.0 - 4.0 g/dL Final    A-G Ratio 02/23/2022 1.1  1.1 - 2.2   Final      Review of Systems   Constitutional: Negative for activity change, fatigue and unexpected weight change. HENT: Negative for congestion, hearing loss, rhinorrhea and sore throat. Eyes: Negative for discharge. Respiratory: Negative for cough, chest tightness and shortness of breath. Cardiovascular: Negative for leg swelling. Gastrointestinal: Positive for abdominal pain. Negative for constipation and diarrhea. Genitourinary: Negative for dysuria, flank pain, frequency and urgency. Musculoskeletal: Negative for arthralgias, back pain and myalgias. Skin: Negative for color change and rash. Neurological: Negative for dizziness, light-headedness and headaches. Psychiatric/Behavioral: Negative for dysphoric mood and sleep disturbance. The patient is not nervous/anxious. Visit Vitals  BP (!) 146/94 (BP 1 Location: Right arm, BP Patient Position: Sitting)   Pulse 66   Temp 97.3 °F (36.3 °C)   Resp 15   Ht 5' 5\" (1.651 m)   Wt 181 lb 6.4 oz (82.3 kg)   SpO2 96%   BMI 30.19 kg/m²      Physical Exam  Vitals and nursing note reviewed. Constitutional:       General: She is not in acute distress. Appearance: Normal appearance. She is well-developed. She is not diaphoretic. HENT:      Right Ear: External ear normal.      Left Ear: External ear normal.   Eyes:      General: No scleral icterus. Right eye: No discharge. Left eye: No discharge. Extraocular Movements: Extraocular movements intact. Conjunctiva/sclera: Conjunctivae normal.   Cardiovascular:      Rate and Rhythm: Normal rate and regular rhythm. Pulmonary:      Effort: Pulmonary effort is normal.      Breath sounds: Normal breath sounds. No wheezing. Abdominal:      General: Bowel sounds are normal.      Palpations: Abdomen is soft. Tenderness: There is no abdominal tenderness.       Comments: +hard area under umbilicus   Musculoskeletal:      Cervical back: Normal range of motion and neck supple. Lymphadenopathy:      Cervical: No cervical adenopathy. Neurological:      Mental Status: She is alert and oriented to person, place, and time. Psychiatric:         Mood and Affect: Mood and affect normal.       An electronic signature was used to authenticate this note.   -- Dominick Hurley

## 2022-03-25 NOTE — PROGRESS NOTES
Chief Complaint   Patient presents with    Skin Problem       Visit Vitals  BP (!) 151/78 (BP 1 Location: Left arm)   Pulse 66   Temp 97.3 °F (36.3 °C)   Resp 15   Ht 5' 5\" (1.651 m)   Wt 181 lb 6.4 oz (82.3 kg)   SpO2 96%   BMI 30.19 kg/m²       1. Have you been to the ER, urgent care clinic since your last visit? Hospitalized since your last visit? No    2. Have you seen or consulted any other health care providers outside of the [address] [place] [address] since your last visit? Include any pap smears or colon screening. No      3. For patients aged 39-70: Has the patient had a colonoscopy / FIT/ Cologuard? Yes - no Care Gap present      If the patient is female:    4. For patients aged 41-77: Has the patient had a mammogram within the past 2 years? Yes - no Care Gap present      5. For patients aged 21-65: Has the patient had a pap smear?  NA - based on age or sex

## 2022-04-25 ENCOUNTER — OFFICE VISIT (OUTPATIENT)
Dept: SURGERY | Age: 67
End: 2022-04-25
Payer: COMMERCIAL

## 2022-04-25 VITALS
RESPIRATION RATE: 18 BRPM | DIASTOLIC BLOOD PRESSURE: 79 MMHG | TEMPERATURE: 98.4 F | BODY MASS INDEX: 30.19 KG/M2 | WEIGHT: 181.2 LBS | HEIGHT: 65 IN | HEART RATE: 69 BPM | OXYGEN SATURATION: 98 % | SYSTOLIC BLOOD PRESSURE: 125 MMHG

## 2022-04-25 DIAGNOSIS — M79.9 LESION OF SOFT TISSUE: ICD-10-CM

## 2022-04-25 PROCEDURE — 99203 OFFICE O/P NEW LOW 30 MIN: CPT | Performed by: SURGERY

## 2022-04-25 NOTE — LETTER
4/25/2022    Patient: Adi Loredo   YOB: 1955   Date of Visit: 4/25/2022     Tee Gandara MD  [address] [place] [address] [place]    Dear Tee Gandara MD,      Thank you for referring Ms. Charla Phan to [facility] for evaluation. My notes for this consultation are attached. If you have questions, please do not hesitate to call me. I look forward to following your patient along with you.       Sincerely,    Rodrigue Ocampo MD

## 2022-04-25 NOTE — ASSESSMENT & PLAN NOTE
Given multiple recurrences and continued drainage, I offered operative excision vs. Continued observation. She would prefer excision and this is certainly reasonable. Will take her to [facility] for exploration and excision. I discussed the intended procedure as well as alternative treatments including doing nothing. I discussed the risks of the procedure including but not limited to bleeding, infection and damage to surrounding structures or recurrence. I answered all questions related to the procedure. After questions were answered, understanding was verbalized and we will proceed as planned.

## 2022-04-25 NOTE — PROGRESS NOTES
Subjective    Patient ID: Yasmine Guevara is a 74 y.o. female. Chief Complaint   Patient presents with    New Patient     Umbilical Abscess     HPI Comments: Nhjennyfer Tomlinson presents today for recurrent drainage and swelling periumbilical.  This has been going on for some time and she has presented to the ED 3 times so far, all 3 times given antibiotics and underwent incision and drainage in ED once. Continues to drain clear fluid, no fevers or chills no redness currently. No other issues. No Known Allergies    Current Outpatient Medications:     citalopram (CELEXA) 10 mg tablet, TAKE 1 TABLET BY MOUTH EVERY DAY, Disp: 90 Tablet, Rfl: 0    polyethylene glycol (Miralax) 17 gram/dose powder, Take 17 g by mouth See Admin Instructions. In a 32oz gatorade (or similar beverage) bottle put 6-8 capfuls of miralax and drink throughout the day. Continue using miralax 1-2 capfuls daily until stool consistency of pudding. Hold for watery stools. , Disp: 289 g, Rfl: 0    atorvastatin (LIPITOR) 10 mg tablet, TAKE 1 TABLET BY MOUTH EVERY EVENING, Disp: 90 Tablet, Rfl: 1    vitamin e (E GEMS) 1,000 unit capsule, Take 1,000 Units by mouth daily. , Disp: , Rfl:     OMEPRAZOLE (PRILOSEC PO), Take  by mouth daily as needed. , Disp: , Rfl:     CALCIUM CARBONATE/VITAMIN D3 (CALTRATE 600 + D PO), Take  by mouth daily. , Disp: , Rfl:     CHOLECALCIFEROL, VITAMIN D3, (VITAMIN D3 PO), Take 1,000 Units by mouth daily. , Disp: , Rfl:     cetirizine (ZYRTEC) 10 mg tablet, Take  by mouth daily as needed. , Disp: , Rfl:     Biotin 2,500 mcg cap, Take  by mouth.  (Patient not taking: Reported on 3/25/2022), Disp: , Rfl:   Past Medical History:   Diagnosis Date    Cancer [facility]) 2005    breast (right)    Ill-defined condition     migraines    Lipoma 11/18/2013     Past Surgical History:   Procedure Laterality Date    HX BREAST LUMPECTOMY      s/p RTx (x7 weeks)    HX CHOLECYSTECTOMY  1997    HX COLONOSCOPY  2009 & 2/27/15    polyps 1st time, 2nd time normal - repeat 5 years - Dr. Aragon Minium HX UROLOGICAL  2013    sling     Social History     Tobacco Use    Smoking status: Former Smoker     Packs/day: 0.50     Years: 15.00     Pack years: 7.50     Quit date: 1993     Years since quittin.3    Smokeless tobacco: Never Used   Vaping Use    Vaping Use: Never used   Substance Use Topics    Alcohol use: Not Currently     Comment: rarely    Drug use: No     Family History   Problem Relation Age of Onset    Hypertension Mother     Cancer Mother         lung, liver, metastatic    Diabetes Sister     Hypertension Sister     Hypertension Brother     Coronary Art Dis Father         since age 42's    Heart Attack Father     Heart Disease Father     Cancer Maternal Grandmother         UNKNOWN    Heart Disease Maternal Grandfather     Cancer Paternal Grandmother     Bleeding Prob Paternal Grandmother         stomach    Lung Disease Paternal Grandfather     Hypertension Brother     Anesth Problems Neg Hx         Review of Systems   Constitutional: Negative for chills and fever. HENT: Negative for congestion and sore throat. Respiratory: Negative for cough, shortness of breath and wheezing. Cardiovascular: Negative for chest pain and palpitations. Gastrointestinal: Negative for abdominal pain, blood in stool, constipation, diarrhea, nausea and vomiting. Musculoskeletal: Negative for joint pain and myalgias. Neurological: Negative for weakness. Endo/Heme/Allergies: Does not bruise/bleed easily. Psychiatric/Behavioral: Negative for depression and suicidal ideas.          Objective    Visit Vitals  /79 (BP 1 Location: Left upper arm, BP Patient Position: Sitting, BP Cuff Size: Adult)   Pulse 69   Temp 98.4 °F (36.9 °C) (Oral)   Resp 18   Ht 5' 5\" (1.651 m)   Wt 181 lb 3.2 oz (82.2 kg)   SpO2 98%   BMI 30.15 kg/m²      Physical Exam  Constitutional:       General: She is not in acute distress. Appearance: Normal appearance. She is not ill-appearing or toxic-appearing. HENT:      Head: Normocephalic and atraumatic. Eyes:      Conjunctiva/sclera: Conjunctivae normal.      Pupils: Pupils are equal, round, and reactive to light. Cardiovascular:      Rate and Rhythm: Normal rate and regular rhythm. Pulmonary:      Effort: Pulmonary effort is normal. No respiratory distress. Abdominal:      General: There is no distension. Palpations: Abdomen is soft. Comments: Periumbilical area with mild redness and clear drainage. No erythema, no induration. US performed in office with image generation, real time interpretation and [place] copy retention and shows 1.79 cm area of complex heterogenous tissue. Mixed echogenecity. Consistent with complex cyst vs. Resolving abscess. Musculoskeletal:         General: No swelling. Skin:     General: Skin is warm and dry. Neurological:      General: No focal deficit present. Mental Status: She is alert and oriented to person, place, and time. Psychiatric:         Mood and Affect: Mood normal.         Thought Content: Thought content normal.         Judgment: Judgment normal.          Problem List Items Addressed This Visit        Other    Lesion of soft tissue     Given multiple recurrences and continued drainage, I offered operative excision vs. Continued observation. She would prefer excision and this is certainly reasonable. Will take her to [facility] for exploration and excision. I discussed the intended procedure as well as alternative treatments including doing nothing. I discussed the risks of the procedure including but not limited to bleeding, infection and damage to surrounding structures or recurrence. I answered all questions related to the procedure. After questions were answered, understanding was verbalized and we will proceed as planned.                      Mahamed Morocho MD personally performed the services described in this document. This documentation was facilitated by voice recognition software and may contain inadvertent typographical errors. If there are substantial concerns about the content of this note that may affect patient care, please contact me for clarification.

## 2022-04-25 NOTE — H&P (VIEW-ONLY)
Subjective    Patient ID: Yolanda Parikh is a 74 y.o. female. Chief Complaint   Patient presents with    New Patient     Umbilical Abscess     HPI Comments: Jalen Munoz presents today for recurrent drainage and swelling periumbilical.  This has been going on for some time and she has presented to the ED 3 times so far, all 3 times given antibiotics and underwent incision and drainage in ED once. Continues to drain clear fluid, no fevers or chills no redness currently. No other issues. No Known Allergies    Current Outpatient Medications:     citalopram (CELEXA) 10 mg tablet, TAKE 1 TABLET BY MOUTH EVERY DAY, Disp: 90 Tablet, Rfl: 0    polyethylene glycol (Miralax) 17 gram/dose powder, Take 17 g by mouth See Admin Instructions. In a 32oz gatorade (or similar beverage) bottle put 6-8 capfuls of miralax and drink throughout the day. Continue using miralax 1-2 capfuls daily until stool consistency of pudding. Hold for watery stools. , Disp: 289 g, Rfl: 0    atorvastatin (LIPITOR) 10 mg tablet, TAKE 1 TABLET BY MOUTH EVERY EVENING, Disp: 90 Tablet, Rfl: 1    vitamin e (E GEMS) 1,000 unit capsule, Take 1,000 Units by mouth daily. , Disp: , Rfl:     OMEPRAZOLE (PRILOSEC PO), Take  by mouth daily as needed. , Disp: , Rfl:     CALCIUM CARBONATE/VITAMIN D3 (CALTRATE 600 + D PO), Take  by mouth daily. , Disp: , Rfl:     CHOLECALCIFEROL, VITAMIN D3, (VITAMIN D3 PO), Take 1,000 Units by mouth daily. , Disp: , Rfl:     cetirizine (ZYRTEC) 10 mg tablet, Take  by mouth daily as needed. , Disp: , Rfl:     Biotin 2,500 mcg cap, Take  by mouth.  (Patient not taking: Reported on 3/25/2022), Disp: , Rfl:   Past Medical History:   Diagnosis Date    Cancer [facility]) 2005    breast (right)    Ill-defined condition     migraines    Lipoma 11/18/2013     Past Surgical History:   Procedure Laterality Date    HX BREAST LUMPECTOMY      s/p RTx (x7 weeks)    HX CHOLECYSTECTOMY  1997    HX COLONOSCOPY  2009 & 2/27/15    polyps 1st time, 2nd time normal - repeat 5 years - Dr. Brady Mallory HX UROLOGICAL  2013    sling     Social History     Tobacco Use    Smoking status: Former Smoker     Packs/day: 0.50     Years: 15.00     Pack years: 7.50     Quit date: 1993     Years since quittin.3    Smokeless tobacco: Never Used   Vaping Use    Vaping Use: Never used   Substance Use Topics    Alcohol use: Not Currently     Comment: rarely    Drug use: No     Family History   Problem Relation Age of Onset    Hypertension Mother     Cancer Mother         lung, liver, metastatic    Diabetes Sister     Hypertension Sister     Hypertension Brother     Coronary Art Dis Father         since age 42's    Heart Attack Father     Heart Disease Father     Cancer Maternal Grandmother         UNKNOWN    Heart Disease Maternal Grandfather     Cancer Paternal Grandmother     Bleeding Prob Paternal Grandmother         stomach    Lung Disease Paternal Grandfather     Hypertension Brother     Anesth Problems Neg Hx         Review of Systems   Constitutional: Negative for chills and fever. HENT: Negative for congestion and sore throat. Respiratory: Negative for cough, shortness of breath and wheezing. Cardiovascular: Negative for chest pain and palpitations. Gastrointestinal: Negative for abdominal pain, blood in stool, constipation, diarrhea, nausea and vomiting. Musculoskeletal: Negative for joint pain and myalgias. Neurological: Negative for weakness. Endo/Heme/Allergies: Does not bruise/bleed easily. Psychiatric/Behavioral: Negative for depression and suicidal ideas.          Objective    Visit Vitals  /79 (BP 1 Location: Left upper arm, BP Patient Position: Sitting, BP Cuff Size: Adult)   Pulse 69   Temp 98.4 °F (36.9 °C) (Oral)   Resp 18   Ht 5' 5\" (1.651 m)   Wt 181 lb 3.2 oz (82.2 kg)   SpO2 98%   BMI 30.15 kg/m²      Physical Exam  Constitutional:       General: She is not in acute distress. Appearance: Normal appearance. She is not ill-appearing or toxic-appearing. HENT:      Head: Normocephalic and atraumatic. Eyes:      Conjunctiva/sclera: Conjunctivae normal.      Pupils: Pupils are equal, round, and reactive to light. Cardiovascular:      Rate and Rhythm: Normal rate and regular rhythm. Pulmonary:      Effort: Pulmonary effort is normal. No respiratory distress. Abdominal:      General: There is no distension. Palpations: Abdomen is soft. Comments: Periumbilical area with mild redness and clear drainage. No erythema, no induration. US performed in office with image generation, real time interpretation and [place] copy retention and shows 1.79 cm area of complex heterogenous tissue. Mixed echogenecity. Consistent with complex cyst vs. Resolving abscess. Musculoskeletal:         General: No swelling. Skin:     General: Skin is warm and dry. Neurological:      General: No focal deficit present. Mental Status: She is alert and oriented to person, place, and time. Psychiatric:         Mood and Affect: Mood normal.         Thought Content: Thought content normal.         Judgment: Judgment normal.          Problem List Items Addressed This Visit        Other    Lesion of soft tissue     Given multiple recurrences and continued drainage, I offered operative excision vs. Continued observation. She would prefer excision and this is certainly reasonable. Will take her to [facility] for exploration and excision. I discussed the intended procedure as well as alternative treatments including doing nothing. I discussed the risks of the procedure including but not limited to bleeding, infection and damage to surrounding structures or recurrence. I answered all questions related to the procedure. After questions were answered, understanding was verbalized and we will proceed as planned.                      Grupo Sloan MD personally performed the services described in this document. This documentation was facilitated by voice recognition software and may contain inadvertent typographical errors. If there are substantial concerns about the content of this note that may affect patient care, please contact me for clarification.

## 2022-04-25 NOTE — PROGRESS NOTES
1. Have you been to the ER, urgent care clinic since your last visit? Hospitalized since your last visit? No    2. Have you seen or consulted any other health care providers outside of the [address] [place] [address] since your last visit? Include any pap smears or colon screening.  No

## 2022-04-26 RX ORDER — SODIUM CHLORIDE 0.9 % (FLUSH) 0.9 %
5-40 SYRINGE (ML) INJECTION EVERY 8 HOURS
Status: CANCELLED | OUTPATIENT
Start: 2022-04-26

## 2022-04-26 RX ORDER — SODIUM CHLORIDE 0.9 % (FLUSH) 0.9 %
5-40 SYRINGE (ML) INJECTION AS NEEDED
Status: CANCELLED | OUTPATIENT
Start: 2022-04-26

## 2022-04-30 ENCOUNTER — ANESTHESIA EVENT (OUTPATIENT)
Dept: MEDSURG UNIT | Age: 67
End: 2022-04-30
Payer: COMMERCIAL

## 2022-05-02 ENCOUNTER — HOSPITAL ENCOUNTER (OUTPATIENT)
Age: 67
Setting detail: OUTPATIENT SURGERY
Discharge: HOME | End: 2022-05-02
Attending: SURGERY | Admitting: SURGERY
Payer: COMMERCIAL

## 2022-05-02 ENCOUNTER — ANESTHESIA (OUTPATIENT)
Dept: MEDSURG UNIT | Age: 67
End: 2022-05-02
Payer: COMMERCIAL

## 2022-05-02 VITALS
RESPIRATION RATE: 14 BRPM | SYSTOLIC BLOOD PRESSURE: 104 MMHG | BODY MASS INDEX: 28.88 KG/M2 | HEART RATE: 54 BPM | HEIGHT: 66 IN | TEMPERATURE: 97.7 F | WEIGHT: 179.68 LBS | DIASTOLIC BLOOD PRESSURE: 66 MMHG | OXYGEN SATURATION: 96 %

## 2022-05-02 DIAGNOSIS — K42.9 UMBILICAL HERNIA WITHOUT OBSTRUCTION AND WITHOUT GANGRENE: Primary | ICD-10-CM

## 2022-05-02 PROCEDURE — 76210000035 HC AMBSU PH I REC 1 TO 1.5 HR: Performed by: SURGERY

## 2022-05-02 PROCEDURE — 77030031139 HC SUT VCRL2 J&J -A: Performed by: SURGERY

## 2022-05-02 PROCEDURE — 74011000250 HC RX REV CODE- 250: Performed by: NURSE ANESTHETIST, CERTIFIED REGISTERED

## 2022-05-02 PROCEDURE — 74011250637 HC RX REV CODE- 250/637: Performed by: STUDENT IN AN ORGANIZED HEALTH CARE EDUCATION/TRAINING PROGRAM

## 2022-05-02 PROCEDURE — 76060000061 HC AMB SURG ANES 0.5 TO 1 HR: Performed by: SURGERY

## 2022-05-02 PROCEDURE — 88304 TISSUE EXAM BY PATHOLOGIST: CPT

## 2022-05-02 PROCEDURE — 77030010507 HC ADH SKN DERMBND J&J -B: Performed by: SURGERY

## 2022-05-02 PROCEDURE — 76030000000 HC AMB SURG OR TIME 0.5 TO 1: Performed by: SURGERY

## 2022-05-02 PROCEDURE — 22902 EXC ABD LES SC < 3 CM: CPT | Performed by: SURGERY

## 2022-05-02 PROCEDURE — 74011000250 HC RX REV CODE- 250: Performed by: SURGERY

## 2022-05-02 PROCEDURE — 77030040922 HC BLNKT HYPOTHRM STRY -A

## 2022-05-02 PROCEDURE — 77030040361 HC SLV COMPR DVT MDII -B: Performed by: SURGERY

## 2022-05-02 PROCEDURE — 74011250636 HC RX REV CODE- 250/636: Performed by: STUDENT IN AN ORGANIZED HEALTH CARE EDUCATION/TRAINING PROGRAM

## 2022-05-02 PROCEDURE — 74011250636 HC RX REV CODE- 250/636: Performed by: NURSE ANESTHETIST, CERTIFIED REGISTERED

## 2022-05-02 PROCEDURE — 49585 PR REPAIR UMBILICAL HERN,5+Y/O,REDUC: CPT | Performed by: SURGERY

## 2022-05-02 PROCEDURE — 77030033138 HC SUT PGA STRATFX J&J -B: Performed by: SURGERY

## 2022-05-02 PROCEDURE — 2709999900 HC NON-CHARGEABLE SUPPLY: Performed by: SURGERY

## 2022-05-02 PROCEDURE — 77030042556 HC PNCL CAUT -B: Performed by: SURGERY

## 2022-05-02 PROCEDURE — 88302 TISSUE EXAM BY PATHOLOGIST: CPT

## 2022-05-02 RX ORDER — SODIUM CHLORIDE 0.9 % (FLUSH) 0.9 %
5-40 SYRINGE (ML) INJECTION EVERY 8 HOURS
Status: DISCONTINUED | OUTPATIENT
Start: 2022-05-02 | End: 2022-05-02 | Stop reason: HOSPADM

## 2022-05-02 RX ORDER — OXYCODONE AND ACETAMINOPHEN 5; 325 MG/1; MG/1
1 TABLET ORAL AS NEEDED
Status: DISCONTINUED | OUTPATIENT
Start: 2022-05-02 | End: 2022-05-02 | Stop reason: HOSPADM

## 2022-05-02 RX ORDER — FENTANYL CITRATE 50 UG/ML
25 INJECTION, SOLUTION INTRAMUSCULAR; INTRAVENOUS
Status: DISCONTINUED | OUTPATIENT
Start: 2022-05-02 | End: 2022-05-02 | Stop reason: HOSPADM

## 2022-05-02 RX ORDER — MIDAZOLAM HYDROCHLORIDE 1 MG/ML
INJECTION, SOLUTION INTRAMUSCULAR; INTRAVENOUS AS NEEDED
Status: DISCONTINUED | OUTPATIENT
Start: 2022-05-02 | End: 2022-05-02 | Stop reason: HOSPADM

## 2022-05-02 RX ORDER — FENTANYL CITRATE 50 UG/ML
50 INJECTION, SOLUTION INTRAMUSCULAR; INTRAVENOUS AS NEEDED
Status: DISCONTINUED | OUTPATIENT
Start: 2022-05-02 | End: 2022-05-02 | Stop reason: HOSPADM

## 2022-05-02 RX ORDER — CEFAZOLIN SODIUM 1 G/3ML
INJECTION, POWDER, FOR SOLUTION INTRAMUSCULAR; INTRAVENOUS AS NEEDED
Status: DISCONTINUED | OUTPATIENT
Start: 2022-05-02 | End: 2022-05-02 | Stop reason: HOSPADM

## 2022-05-02 RX ORDER — BUPIVACAINE HYDROCHLORIDE 2.5 MG/ML
INJECTION, SOLUTION EPIDURAL; INFILTRATION; INTRACAUDAL AS NEEDED
Status: DISCONTINUED | OUTPATIENT
Start: 2022-05-02 | End: 2022-05-02 | Stop reason: HOSPADM

## 2022-05-02 RX ORDER — SODIUM CHLORIDE, SODIUM LACTATE, POTASSIUM CHLORIDE, CALCIUM CHLORIDE 600; 310; 30; 20 MG/100ML; MG/100ML; MG/100ML; MG/100ML
125 INJECTION, SOLUTION INTRAVENOUS CONTINUOUS
Status: DISCONTINUED | OUTPATIENT
Start: 2022-05-02 | End: 2022-05-02 | Stop reason: HOSPADM

## 2022-05-02 RX ORDER — MIDAZOLAM HYDROCHLORIDE 1 MG/ML
0.5 INJECTION, SOLUTION INTRAMUSCULAR; INTRAVENOUS
Status: DISCONTINUED | OUTPATIENT
Start: 2022-05-02 | End: 2022-05-02 | Stop reason: HOSPADM

## 2022-05-02 RX ORDER — ONDANSETRON 2 MG/ML
INJECTION INTRAMUSCULAR; INTRAVENOUS AS NEEDED
Status: DISCONTINUED | OUTPATIENT
Start: 2022-05-02 | End: 2022-05-02 | Stop reason: HOSPADM

## 2022-05-02 RX ORDER — EPHEDRINE SULFATE/0.9% NACL/PF 50 MG/5 ML
5 SYRINGE (ML) INTRAVENOUS AS NEEDED
Status: DISCONTINUED | OUTPATIENT
Start: 2022-05-02 | End: 2022-05-02 | Stop reason: HOSPADM

## 2022-05-02 RX ORDER — MIDAZOLAM HYDROCHLORIDE 1 MG/ML
1 INJECTION, SOLUTION INTRAMUSCULAR; INTRAVENOUS AS NEEDED
Status: DISCONTINUED | OUTPATIENT
Start: 2022-05-02 | End: 2022-05-02 | Stop reason: HOSPADM

## 2022-05-02 RX ORDER — SODIUM CHLORIDE 0.9 % (FLUSH) 0.9 %
5-40 SYRINGE (ML) INJECTION AS NEEDED
Status: DISCONTINUED | OUTPATIENT
Start: 2022-05-02 | End: 2022-05-02 | Stop reason: HOSPADM

## 2022-05-02 RX ORDER — SODIUM CHLORIDE 9 MG/ML
125 INJECTION, SOLUTION INTRAVENOUS CONTINUOUS
Status: DISCONTINUED | OUTPATIENT
Start: 2022-05-02 | End: 2022-05-02 | Stop reason: HOSPADM

## 2022-05-02 RX ORDER — SODIUM CHLORIDE, SODIUM LACTATE, POTASSIUM CHLORIDE, CALCIUM CHLORIDE 600; 310; 30; 20 MG/100ML; MG/100ML; MG/100ML; MG/100ML
1000 INJECTION, SOLUTION INTRAVENOUS CONTINUOUS
Status: DISCONTINUED | OUTPATIENT
Start: 2022-05-02 | End: 2022-05-02 | Stop reason: HOSPADM

## 2022-05-02 RX ORDER — LIDOCAINE HYDROCHLORIDE 20 MG/ML
INJECTION, SOLUTION EPIDURAL; INFILTRATION; INTRACAUDAL; PERINEURAL AS NEEDED
Status: DISCONTINUED | OUTPATIENT
Start: 2022-05-02 | End: 2022-05-02 | Stop reason: HOSPADM

## 2022-05-02 RX ORDER — DIPHENHYDRAMINE HYDROCHLORIDE 50 MG/ML
12.5 INJECTION, SOLUTION INTRAMUSCULAR; INTRAVENOUS AS NEEDED
Status: DISCONTINUED | OUTPATIENT
Start: 2022-05-02 | End: 2022-05-02 | Stop reason: HOSPADM

## 2022-05-02 RX ORDER — FENTANYL CITRATE 50 UG/ML
INJECTION, SOLUTION INTRAMUSCULAR; INTRAVENOUS AS NEEDED
Status: DISCONTINUED | OUTPATIENT
Start: 2022-05-02 | End: 2022-05-02 | Stop reason: HOSPADM

## 2022-05-02 RX ORDER — ONDANSETRON 2 MG/ML
4 INJECTION INTRAMUSCULAR; INTRAVENOUS AS NEEDED
Status: DISCONTINUED | OUTPATIENT
Start: 2022-05-02 | End: 2022-05-02 | Stop reason: HOSPADM

## 2022-05-02 RX ORDER — MORPHINE SULFATE 2 MG/ML
2 INJECTION, SOLUTION INTRAMUSCULAR; INTRAVENOUS
Status: DISCONTINUED | OUTPATIENT
Start: 2022-05-02 | End: 2022-05-02 | Stop reason: HOSPADM

## 2022-05-02 RX ORDER — SODIUM CHLORIDE 9 MG/ML
1000 INJECTION, SOLUTION INTRAVENOUS CONTINUOUS
Status: DISCONTINUED | OUTPATIENT
Start: 2022-05-02 | End: 2022-05-02 | Stop reason: HOSPADM

## 2022-05-02 RX ORDER — HYDROMORPHONE HYDROCHLORIDE 1 MG/ML
0.2 INJECTION, SOLUTION INTRAMUSCULAR; INTRAVENOUS; SUBCUTANEOUS
Status: DISCONTINUED | OUTPATIENT
Start: 2022-05-02 | End: 2022-05-02 | Stop reason: HOSPADM

## 2022-05-02 RX ORDER — OXYCODONE HYDROCHLORIDE 5 MG/1
5 TABLET ORAL
Qty: 10 TABLET | Refills: 0 | Status: SHIPPED | OUTPATIENT
Start: 2022-05-02 | End: 2022-05-05

## 2022-05-02 RX ORDER — LIDOCAINE HYDROCHLORIDE 10 MG/ML
0.1 INJECTION, SOLUTION EPIDURAL; INFILTRATION; INTRACAUDAL; PERINEURAL AS NEEDED
Status: DISCONTINUED | OUTPATIENT
Start: 2022-05-02 | End: 2022-05-02 | Stop reason: HOSPADM

## 2022-05-02 RX ORDER — ACETAMINOPHEN 500 MG
1000 TABLET ORAL ONCE
Status: COMPLETED | OUTPATIENT
Start: 2022-05-02 | End: 2022-05-02

## 2022-05-02 RX ORDER — DEXMEDETOMIDINE HYDROCHLORIDE 100 UG/ML
INJECTION, SOLUTION INTRAVENOUS AS NEEDED
Status: DISCONTINUED | OUTPATIENT
Start: 2022-05-02 | End: 2022-05-02 | Stop reason: HOSPADM

## 2022-05-02 RX ORDER — PROPOFOL 10 MG/ML
INJECTION, EMULSION INTRAVENOUS AS NEEDED
Status: DISCONTINUED | OUTPATIENT
Start: 2022-05-02 | End: 2022-05-02 | Stop reason: HOSPADM

## 2022-05-02 RX ORDER — PROPOFOL 10 MG/ML
INJECTION, EMULSION INTRAVENOUS
Status: DISCONTINUED | OUTPATIENT
Start: 2022-05-02 | End: 2022-05-02 | Stop reason: HOSPADM

## 2022-05-02 RX ADMIN — PROPOFOL 75 MCG/KG/MIN: 10 INJECTION, EMULSION INTRAVENOUS at 07:31

## 2022-05-02 RX ADMIN — ONDANSETRON HYDROCHLORIDE 4 MG: 2 INJECTION, SOLUTION INTRAMUSCULAR; INTRAVENOUS at 07:40

## 2022-05-02 RX ADMIN — FENTANYL CITRATE 25 MCG: 50 INJECTION, SOLUTION INTRAMUSCULAR; INTRAVENOUS at 08:12

## 2022-05-02 RX ADMIN — FENTANYL CITRATE 25 MCG: 50 INJECTION, SOLUTION INTRAMUSCULAR; INTRAVENOUS at 07:44

## 2022-05-02 RX ADMIN — CEFAZOLIN 2 G: 330 INJECTION, POWDER, FOR SOLUTION INTRAMUSCULAR; INTRAVENOUS at 07:36

## 2022-05-02 RX ADMIN — FENTANYL CITRATE 25 MCG: 50 INJECTION, SOLUTION INTRAMUSCULAR; INTRAVENOUS at 07:36

## 2022-05-02 RX ADMIN — PROPOFOL 40 MG: 10 INJECTION, EMULSION INTRAVENOUS at 07:31

## 2022-05-02 RX ADMIN — ACETAMINOPHEN 1000 MG: 500 TABLET ORAL at 07:01

## 2022-05-02 RX ADMIN — DEXMEDETOMIDINE HYDROCHLORIDE 10 MCG: 100 INJECTION, SOLUTION, CONCENTRATE INTRAVENOUS at 07:44

## 2022-05-02 RX ADMIN — SODIUM CHLORIDE, POTASSIUM CHLORIDE, SODIUM LACTATE AND CALCIUM CHLORIDE 125 ML/HR: 600; 310; 30; 20 INJECTION, SOLUTION INTRAVENOUS at 07:01

## 2022-05-02 RX ADMIN — FENTANYL CITRATE 25 MCG: 50 INJECTION, SOLUTION INTRAMUSCULAR; INTRAVENOUS at 08:51

## 2022-05-02 RX ADMIN — FENTANYL CITRATE 25 MCG: 50 INJECTION, SOLUTION INTRAMUSCULAR; INTRAVENOUS at 07:31

## 2022-05-02 RX ADMIN — DEXMEDETOMIDINE HYDROCHLORIDE 10 MCG: 100 INJECTION, SOLUTION, CONCENTRATE INTRAVENOUS at 07:31

## 2022-05-02 RX ADMIN — LIDOCAINE HYDROCHLORIDE 50 MG: 20 INJECTION, SOLUTION EPIDURAL; INFILTRATION; INTRACAUDAL; PERINEURAL at 07:31

## 2022-05-02 RX ADMIN — FENTANYL CITRATE 25 MCG: 50 INJECTION, SOLUTION INTRAMUSCULAR; INTRAVENOUS at 08:37

## 2022-05-02 RX ADMIN — MIDAZOLAM 2 MG: 1 INJECTION INTRAMUSCULAR; INTRAVENOUS at 07:25

## 2022-05-02 NOTE — DISCHARGE INSTRUCTIONS
Post Op Instructions: You may shower when desired. You may eat and drink when you get home. Start slow with you diet. Eat easy to digest foods for the first meal - items such as breakfast foods, soup, sandwich, pasta, chicken. Avoid fried, spicy and heavy meals for the first meal.  Also avoid gas producing foods for today such as cabbage, broccoli and beans. Take short walks every day. Do not lift more than 10 lbs for the next 2 weeks. Follow up with Dr. Reyes Starch in 2 weeks in the office. After general anesthesia or intravenous sedation, for 24 hours or while taking prescription Narcotics:  · Limit your activities  · Do not drive and operate hazardous machinery  · Do not make important personal or business decisions  · Do  not drink alcoholic beverages  · If you have not urinated within 8 hours after discharge, please contact your surgeon on call. The discharge information has been reviewed with the patient and spouse. The patient and spouse verbalized understanding. Discharge medications reviewed with the patient and spouse and appropriate educational materials and side effects teaching were provided.   ___________________________________________________________________________________________________________________________________

## 2022-05-02 NOTE — ROUTINE PROCESS
Patient: Bry Hernandez MRN: [id number]  SSN: [id number]   YOB: 1955  Age: 74 y.o. Sex: female     Patient is status post Procedure(s):  EXCISION OF UMBILCAL TUMOR, REPAIR OF UMBILICAL HERNIA.     Surgeon(s) and Role:     Josh Rhoades MD - Primary    Local/Dose/Irrigation:                    Peripheral IV 05/02/22 Left;Posterior Hand (Active)                           Dressing/Packing:  Incision 82/90/67 Umbilicus-Dressing/Treatment: Skin glue (05/02/22 9715)    Splint/Cast:  ]    Other:

## 2022-05-02 NOTE — ANESTHESIA POSTPROCEDURE EVALUATION
Procedure(s):  EXCISION OF UMBILCAL TUMOR, REPAIR OF UMBILICAL HERNIA. MAC    Anesthesia Post Evaluation      Multimodal analgesia: multimodal analgesia not used between 6 hours prior to anesthesia start to PACU discharge  Patient location during evaluation: bedside  Patient participation: complete - patient participated  Level of consciousness: awake  Pain score: 0  Pain management: satisfactory to patient  Airway patency: patent  Anesthetic complications: no  Cardiovascular status: acceptable and blood pressure returned to baseline  Respiratory status: acceptable  Hydration status: acceptable  Comments: I have evaluated the patient and meets criteria for discharge from PACU. Isabelle Ochoa DO. Post anesthesia nausea and vomiting:  none  Final Post Anesthesia Temperature Assessment:  Normothermia (36.0-37.5 degrees C)      INITIAL Post-op Vital signs:   Vitals Value Taken Time   /66 05/02/22 0915   Temp 36.5 °C (97.7 °F) 05/02/22 0822   Pulse 53 05/02/22 0915   Resp 15 05/02/22 0915   SpO2 90 % 05/02/22 0915   Vitals shown include unvalidated device data.

## 2022-05-02 NOTE — ANESTHESIA PREPROCEDURE EVALUATION
Anesthetic History   No history of anesthetic complications            Review of Systems / Medical History  Patient summary reviewed, nursing notes reviewed and pertinent labs reviewed    Pulmonary        Sleep apnea: CPAP           Neuro/Psych   Within defined limits           Cardiovascular              Hyperlipidemia    Exercise tolerance: >4 METS     GI/Hepatic/Renal     GERD: well controlled      Liver disease (GARCES)     Endo/Other  Within defined limits           Other Findings              Physical Exam    Airway  Mallampati: III  TM Distance: > 6 cm  Neck ROM: normal range of motion   Mouth opening: Normal     Cardiovascular  Regular rate and rhythm,  S1 and S2 normal,  no murmur, click, rub, or gallop             Dental  No notable dental hx       Pulmonary  Breath sounds clear to auscultation               Abdominal  GI exam deferred       Other Findings            Anesthetic Plan    ASA: 2  Anesthesia type: MAC          Induction: Intravenous  Anesthetic plan and risks discussed with: Patient

## 2022-05-02 NOTE — INTERVAL H&P NOTE
Update History & Physical    The Patient's History and Physical of April 25, was reviewed with the patient and I examined the patient. There was no change. The surgical site was confirmed by the patient and me. Plan:  The risk, benefits, expected outcome, and alternative to the recommended procedure have been discussed with the patient. Patient understands and wants to proceed with the procedure.     Electronically signed by Aline Marie MD on 5/2/2022 at 7:16 AM

## 2022-05-02 NOTE — OP NOTES
Operative Report    Patient: Lucía Calabrese MRN: [id number]  SSN: [id number]    YOB: 1955  Age: 74 y.o. Sex: female       Date of Surgery: 5/2/2022     Preoperative Diagnosis: UMBILICAL SOFT TISSUE LESION, RECURRENT     Postoperative Diagnosis: UMBILICAL SOFT TISSUE LESION, RECURRENT               UMBILICAL HERNIA WITHOUT OBSTRUCTION OR                   GANGRENE     Indications: Presented with umbilical lesion that was recurrent and alternated bleeding and clear fluid expression. Offered excision given symptoms and she wished to have it excised. I discussed the intended procedure as well as alternative treatments including doing nothing. I discussed the risks of the procedure including but not limited to bleeding, infection and damage to surrounding structures or on-diagnostic pathology. I answered all questions related to the proceedure. After questions were answered, understanding was verbalized and we will proceed as planned. Surgeon(s) and Role:     * Marina Hester MD - Primary    Anesthesia: MAC     Procedure: Procedure(s):  EXCISION OF UMBILCAL TUMOR, REPAIR OF UMBILICAL HERNIA     Procedure in Detail: Pt prepped and draped in usual sterile fashion after time out was performed. Local injected into dana-umbilical region. Segun Patten shaped incision made to better close defect. Sharp dissection down through subcutaneous tissues to the deeper layer surrounding lesion. Lesion was attached to fascia and had an appearance of endometrioma vs. Chroniccally infected tissue. Lesion was carefully identified and dissected free. Total size resected was 2.5 x 4 x 3 cm. Hemostasis was obtained. Cavity was examined and an umbilical hernia was identified. Given overall circumstances, I elected to repair the umbilical defect primarily. Using 0-vicryl sutures and 2 figure of eights the defect was closed.   Incision was irrigated and then closed in layered fashion with 3-0 vicyl interrupted quilted sutures to prevent seroma followed by 4-0 strattafix in a running subcuticular fashion and covered with dermabond. Procedure was well tolerated and pt was transferred to pacu in good condition. Estimated Blood Loss:  5mL    Tourniquet Time: * No tourniquets in log *      Implants: * No implants in log *            Specimens:   ID Type Source Tests Collected by Time Destination   1 : Umbilical Defect Preservative Toya Castellon MD 5/2/2022 3210 Pathology           Drains: None                Complications: None    Counts: Sponge and needle counts were correct times two. Signed By:  Kiel García MD     May 2, 2022      This documentation was facilitated by voice recognition software and may contain inadvertent typographical errors. If there are substantial concerns about the content of this note that may affect patient care, please contact me for clarification.

## 2022-05-16 ENCOUNTER — OFFICE VISIT (OUTPATIENT)
Dept: SURGERY | Age: 67
End: 2022-05-16
Payer: COMMERCIAL

## 2022-05-16 VITALS
DIASTOLIC BLOOD PRESSURE: 74 MMHG | OXYGEN SATURATION: 94 % | RESPIRATION RATE: 18 BRPM | WEIGHT: 179.6 LBS | SYSTOLIC BLOOD PRESSURE: 115 MMHG | HEIGHT: 66 IN | HEART RATE: 60 BPM | TEMPERATURE: 98.3 F | BODY MASS INDEX: 28.87 KG/M2

## 2022-05-16 DIAGNOSIS — Z09 S/P UMBILICAL HERNIA REPAIR, FOLLOW-UP EXAM: ICD-10-CM

## 2022-05-16 DIAGNOSIS — Z09 FOLLOW-UP EXAMINATION AFTER ABDOMINAL SURGERY: Primary | ICD-10-CM

## 2022-05-16 PROCEDURE — 99024 POSTOP FOLLOW-UP VISIT: CPT | Performed by: NURSE PRACTITIONER

## 2022-05-16 NOTE — PROGRESS NOTES
1. Have you been to the ER, urgent care clinic since your last visit? Hospitalized since your last visit? No    2. Have you seen or consulted any other health care providers outside of the [address] [place] [address] since your last visit? Include any pap smears or colon screening.  No
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
## 2022-05-16 NOTE — PATIENT INSTRUCTIONS
For constipation-     Ok to add Senna, which is available in a tablet or tea form (smooth move tea). You may take 1-2 tabs every day as needed or drink a cup of the tea every day as needed.     Recommendations after abdominal surgery:    -  Avoid heavy lifting and or straining anything > about 25 lbs for another 2 weeks     -  Avoid abdominal exercises for another 2 weeks     -  Daily walking and resuming your normal day to day activities is encouraged and as tolerated      -  Ok to bath as normal and you may get in a swimming pool     -  Use sun block on exposed skin and ok to moisturize the incisions as desired

## 2022-05-30 RX ORDER — CITALOPRAM 10 MG/1
TABLET ORAL
Qty: 90 TABLET | Refills: 0 | Status: SHIPPED | OUTPATIENT
Start: 2022-05-30 | End: 2022-08-31

## 2022-06-13 ENCOUNTER — NURSE TRIAGE (OUTPATIENT)
Dept: OTHER | Facility: CLINIC | Age: 67
End: 2022-06-13

## 2022-06-13 NOTE — TELEPHONE ENCOUNTER
Received call from [address] at [facility] with Red Flag Complaint. Subjective: high blood pressure. Last time I saw Dr Claribel Osman she had said it was running high. Last week when I saw dentist it was 165/80. Having headaches as well. Current Symptoms: headache     Onset: 4 months ago; worsening    Associated Symptoms: NA    Pain Severity: 4/10; pressure; intermittent    Temperature: denies fever by unknown method    What has been tried: nothing    LMP: NA Pregnant: NA    Recommended disposition: See PCP within 3 Days    Care advice provided, patient verbalizes understanding; denies any other questions or concerns; instructed to call back for any new or worsening symptoms. Patient/Caller agrees with recommended disposition; writer provided warm transfer to [facility] at [facility] for appointment scheduling    Attention Provider: Thank you for allowing me to participate in the care of your patient. The patient was connected to triage in response to information provided to the [facility]. Please do not respond through this encounter as the response is not directed to a shared pool.         Reason for Disposition   Systolic BP >= 386 OR Diastolic >= 914    Protocols used: BLOOD PRESSURE - HIGH-ADULT-OH

## 2022-06-13 NOTE — TELEPHONE ENCOUNTER
Patient transferred by [facility]. Frustrated because she stated she has to keep saying the same thing over and over. Said she was speaking with Dr Carmen Larose nurse then was sent to someone else then to me. Explained that it was the call center she was speaking with and it was the triage nurse not the office however she was speaking with the office now. She stated that he bp was elevated last Thursday at her dentist appointment. And if it was a problem to schedule her then she would find another doctor. Let her know I could schedule her but it would not be today but I would see what I could do. Patient said she did not understand why she has to go through so much just to see her doctor and the other nurse said she could see where her bp has been elevated the last few months so she does not understand why I could not see that. I explained that I do not know what the triage nurse was seeing however I could not see any of that.  I did see where Dr Maverick Eastman advised her to monitor her bp and the patient stated she has not been doing that.   scheduled patient an appointment for tomorrow at [phone] with Dr Demond Goddard

## 2022-06-14 ENCOUNTER — OFFICE VISIT (OUTPATIENT)
Dept: PRIMARY CARE CLINIC | Age: 67
End: 2022-06-14
Payer: COMMERCIAL

## 2022-06-14 VITALS
RESPIRATION RATE: 18 BRPM | TEMPERATURE: 97.5 F | HEIGHT: 66 IN | OXYGEN SATURATION: 97 % | SYSTOLIC BLOOD PRESSURE: 132 MMHG | HEART RATE: 67 BPM | DIASTOLIC BLOOD PRESSURE: 82 MMHG | BODY MASS INDEX: 29.02 KG/M2 | WEIGHT: 180.6 LBS

## 2022-06-14 DIAGNOSIS — F41.9 ANXIETY: ICD-10-CM

## 2022-06-14 DIAGNOSIS — I10 PRIMARY HYPERTENSION: Primary | ICD-10-CM

## 2022-06-14 DIAGNOSIS — Z98.890 S/P REPAIR OF VENTRAL HERNIA: ICD-10-CM

## 2022-06-14 DIAGNOSIS — Z87.19 S/P REPAIR OF VENTRAL HERNIA: ICD-10-CM

## 2022-06-14 DIAGNOSIS — R51.9 FREQUENT HEADACHES: ICD-10-CM

## 2022-06-14 DIAGNOSIS — G47.33 OBSTRUCTIVE SLEEP APNEA SYNDROME: ICD-10-CM

## 2022-06-14 PROCEDURE — 99214 OFFICE O/P EST MOD 30 MIN: CPT | Performed by: INTERNAL MEDICINE

## 2022-06-14 PROCEDURE — 1123F ACP DISCUSS/DSCN MKR DOCD: CPT | Performed by: INTERNAL MEDICINE

## 2022-06-14 RX ORDER — AMLODIPINE BESYLATE 2.5 MG/1
2.5 TABLET ORAL DAILY
Qty: 30 TABLET | Refills: 0 | Status: SHIPPED | OUTPATIENT
Start: 2022-06-14 | End: 2022-07-19

## 2022-06-14 NOTE — PROGRESS NOTES
1. \"Have you been to the ER, urgent care clinic since your last visit? Hospitalized since your last visit? \" No    2. \"Have you seen or consulted any other health care providers outside of the [address] [place] [address] since your last visit? \" Yes Where: Dentist     3. For patients aged 39-70: Has the patient had a colonoscopy / FIT/ Cologuard? Yes - Care Gap present. Rooming MA/LPN to request most recent results      If the patient is female:    4. For patients aged 41-77: Has the patient had a mammogram within the past 2 years? Yes - Care Gap present. Rooming MA/LPN to request most recent results      5. For patients aged 21-65: Has the patient had a pap smear? Yes - Care Gap present. Rooming MA/LPN to request most recent results Stated she had it done last year. Chief Complaint   Patient presents with    Blood Pressure Check     High blood pressure. Went to dentist last week and BP was high.

## 2022-06-14 NOTE — PROGRESS NOTES
More Morrison (: 1955) is a 74 y.o. female, established patient, here for evaluation of the following chief complaint(s):  Blood Pressure Check (High blood pressure. Went to dentist last week and BP was high.)     Written by Jessica Kang, as dictated by Dr. Rachel Jordan MD.      ASSESSMENT/PLAN:  Below is the assessment and plan developed based on review of pertinent history, physical exam, labs, studies, and medications. 1. Primary hypertension  Will start her on amlodipine 2.5 mg daily. Potential side effects were discussed. I asked her to buy a home BP monitor and start checking her BP regularly at home. -     amLODIPine (NORVASC) 2.5 mg tablet; Take 1 Tablet by mouth daily for 30 days. , Normal, Disp-30 Tablet, R-0 sent to pharmacy. 2. S/P repair of ventral hernia  Followed by Dr. Mervin Pyle (general surgery). She is doing well post op and abdominal pain has resolved. 3. Frequent headaches  Most likely secondary to elevated BP.     4. Obstructive sleep apnea syndrome  Recommend she start using her dental appliance nightly. Discussed untreated SIMRAN can be contributing to elevated BP readings. 5. Anxiety  Well controlled on Celexa 10 mg daily. Continue on current medication(s). SUBJECTIVE/OBJECTIVE:  HPI   The patient presents today for a follow-up on her BP. Her BP was elevated at her last appt on 22, but I thought this was due to pain. She also notes her BP was elevated last week at the dentist. She has not been checking her BP at home. She has been getting more headaches recently. Her BP today is 134/82, 132/82 on manual repeat. She does not have a headache right now. She is s/p excision of umbilical tumor, repair of umbilical hernia with Dr. Mervin Pyle (general surgery) on 22. She notes that surgery went well and her abdominal pain has resolved. She has SIMRAN and has a dental appliance, but does not use this.      She is on Celexa 10 mg daily for anxiety which has been working well. Patient Active Problem List   Diagnosis Code    History of breast cancer Z85.3    S/P cholecystectomy Z90.49    S/P tubal ligation Z98.51    H/O bone density study Z92.89    Obesity (BMI 30-39. 9) E66.9    Obstructive sleep apnea syndrome G47.33    Advance directive discussed with patient Z71.89    Fatty liver K76.0    Mixed hyperlipidemia E78.2    Lesion of soft tissue M79.9    S/P repair of ventral hernia Z98.890, Z87.19        Current Outpatient Medications on File Prior to Visit   Medication Sig Dispense Refill    citalopram (CELEXA) 10 mg tablet TAKE 1 TABLET BY MOUTH EVERY DAY 90 Tablet 0    atorvastatin (LIPITOR) 10 mg tablet TAKE 1 TABLET BY MOUTH EVERY EVENING 90 Tablet 1    vitamin e (E GEMS) 1,000 unit capsule Take 1,000 Units by mouth daily.  OMEPRAZOLE (PRILOSEC PO) Take  by mouth daily as needed.  CALCIUM CARBONATE/VITAMIN D3 (CALTRATE 600 + D PO) Take  by mouth daily.  CHOLECALCIFEROL, VITAMIN D3, (VITAMIN D3 PO) Take 1,000 Units by mouth daily. No current facility-administered medications on file prior to visit.        No Known Allergies    Past Medical History:   Diagnosis Date    Cancer [facility]) 2005    breast (right)    Ill-defined condition     migraines    Lipoma 11/18/2013       Past Surgical History:   Procedure Laterality Date    HX BREAST LUMPECTOMY      s/p RTx (x7 weeks)    HX CHOLECYSTECTOMY  1997    HX COLONOSCOPY  2009 & 2/27/15    polyps 1st time, 2nd time normal - repeat 5 years - Dr. Lamar Stuart HX UROLOGICAL  2013    sling       Family History   Problem Relation Age of Onset    Hypertension Mother     Cancer Mother         lung, liver, metastatic    Diabetes Sister     Hypertension Sister     Hypertension Brother     Coronary Art Dis Father         since age 42's    Heart Attack Father     Heart Disease Father     Cancer Maternal Grandmother         UNKNOWN    Heart Disease Maternal Grandfather     Cancer Paternal Grandmother     Bleeding Prob Paternal Grandmother         stomach    Lung Disease Paternal Grandfather     Hypertension Brother     Anesth Problems Neg Hx        Social History     Socioeconomic History    Marital status:      Spouse name: Not on file    Number of children: Not on file    Years of education: Not on file    Highest education level: Not on file   Occupational History    Not on file   Tobacco Use    Smoking status: Former Smoker     Packs/day: 0.50     Years: 15.00     Pack years: 7.50     Quit date: 1993     Years since quittin.5    Smokeless tobacco: Never Used   Vaping Use    Vaping Use: Never used   Substance and Sexual Activity    Alcohol use: Not Currently     Comment: rarely    Drug use: No    Sexual activity: Yes     Partners: Male     Birth control/protection: None   Other Topics Concern    Not on file   Social History Narrative    Not on file       No visits with results within 3 Month(s) from this visit. Latest known visit with results is:   Admission on 2022, Discharged on 2022   Component Date Value Ref Range Status    SAMPLES BEING HELD 2022 RED, B/C   Final    COMMENT 2022 Add-on orders for these samples will be processed based on acceptable specimen integrity and analyte stability, which may vary by analyte.     Final    WBC 2022 8.2  3.6 - 11.0 K/uL Final    RBC 2022 4.92  3.80 - 5.20 M/uL Final    HGB 2022 15.3  11.5 - 16.0 g/dL Final    HCT 2022 45.8  35.0 - 47.0 % Final    MCV 2022 93.1  80.0 - 99.0 FL Final    MCH 2022 31.1  26.0 - 34.0 PG Final    MCHC 2022 33.4  30.0 - 36.5 g/dL Final    RDW 2022 12.1  11.5 - 14.5 % Final    PLATELET  668  150 - 400 K/uL Final    MPV 2022 10.7  8.9 - 12.9 FL Final    NRBC 2022 0.0  0  WBC Final    ABSOLUTE NRBC 2022 0.00  0.00 - 0.01 K/uL Final    NEUTROPHILS 02/26/2022 72  32 - 75 % Final    LYMPHOCYTES 02/26/2022 20  12 - 49 % Final    MONOCYTES 02/26/2022 7  5 - 13 % Final    EOSINOPHILS 02/26/2022 1  0 - 7 % Final    BASOPHILS 02/26/2022 1  0 - 1 % Final    IMMATURE GRANULOCYTES 02/26/2022 0  0.0 - 0.5 % Final    ABS. NEUTROPHILS 02/26/2022 5.9  1.8 - 8.0 K/UL Final    ABS. LYMPHOCYTES 02/26/2022 1.6  0.8 - 3.5 K/UL Final    ABS. MONOCYTES 02/26/2022 0.5  0.0 - 1.0 K/UL Final    ABS. EOSINOPHILS 02/26/2022 0.0  0.0 - 0.4 K/UL Final    ABS. BASOPHILS 02/26/2022 0.1  0.0 - 0.1 K/UL Final    ABS. IMM. GRANS. 02/26/2022 0.0  0.00 - 0.04 K/UL Final    DF 02/26/2022 AUTOMATED    Final    Sodium 02/26/2022 137  136 - 145 mmol/L Final    Potassium 02/26/2022 3.9  3.5 - 5.1 mmol/L Final    Chloride 02/26/2022 99  97 - 108 mmol/L Final    CO2 02/26/2022 27  21 - 32 mmol/L Final    Anion gap 02/26/2022 11  5 - 15 mmol/L Final    Glucose 02/26/2022 106* 65 - 100 mg/dL Final    BUN 02/26/2022 8  6 - 20 MG/DL Final    Creatinine 02/26/2022 0.83  0.55 - 1.02 MG/DL Final    BUN/Creatinine ratio 02/26/2022 10* 12 - 20   Final    GFR est AA 02/26/2022 >60  >60 ml/min/1.73m2 Final    GFR est non-AA 02/26/2022 >60  >60 ml/min/1.73m2 Final    Calcium 02/26/2022 9.1  8.5 - 10.1 MG/DL Final    Bilirubin, total 02/26/2022 0.6  0.2 - 1.0 MG/DL Final    ALT (SGPT) 02/26/2022 27  12 - 78 U/L Final    AST (SGOT) 02/26/2022 16  15 - 37 U/L Final    Alk.  phosphatase 02/26/2022 140* 45 - 117 U/L Final    Protein, total 02/26/2022 7.7  6.4 - 8.2 g/dL Final    Albumin 02/26/2022 4.0  3.5 - 5.0 g/dL Final    Globulin 02/26/2022 3.7  2.0 - 4.0 g/dL Final    A-G Ratio 02/26/2022 1.1  1.1 - 2.2   Final    Lipase 02/26/2022 148  73 - 393 U/L Final    Color 02/26/2022 YELLOW/STRAW    Final    Color Reference Range: Straw, Yellow or Dark Yellow    Appearance 02/26/2022 CLEAR  CLEAR   Final    Specific gravity 02/26/2022 1.015  1.003 - 1.030   Final    pH (UA) 02/26/2022 6.5  5.0 - 8.0   Final    Protein 02/26/2022 Negative  NEG mg/dL Final    Glucose 02/26/2022 Negative  NEG mg/dL Final    Ketone 02/26/2022 Negative  NEG mg/dL Final    Bilirubin 02/26/2022 Negative  NEG   Final    Blood 02/26/2022 TRACE* NEG   Final    Urobilinogen 02/26/2022 1.0  0.2 - 1.0 EU/dL Final    Nitrites 02/26/2022 Negative  NEG   Final    Leukocyte Esterase 02/26/2022 Negative  NEG   Final    WBC 02/26/2022 0-4  0 - 4 /hpf Final    RBC 02/26/2022 5-10  0 - 5 /hpf Final    Epithelial cells 02/26/2022 FEW  FEW /lpf Final    Epithelial cell category consists of squamous cells and /or transitional urothelial cells. Renal tubular cells, if present, are separately identified as such.  Bacteria 02/26/2022 Negative  NEG /hpf Final    Mucus 02/26/2022 TRACE* NEG /lpf Final    Hyaline cast 02/26/2022 10-20  0 - 5 /lpf Final    Urine culture hold 02/26/2022 Urine on hold in Microbiology dept for 2 days. If unpreserved urine is submitted, it cannot be used for addtional testing after 24 hours, recollection will be required. Final      Review of Systems   Constitutional: Negative for activity change, fatigue and unexpected weight change. HENT: Negative for congestion, hearing loss, rhinorrhea and sore throat. Eyes: Negative for discharge. Respiratory: Negative for cough, chest tightness and shortness of breath. Cardiovascular: Negative for leg swelling. Gastrointestinal: Negative for abdominal pain, constipation and diarrhea. Genitourinary: Negative for dysuria, flank pain, frequency and urgency. Musculoskeletal: Negative for arthralgias, back pain and myalgias. Skin: Negative for color change and rash. Neurological: Positive for headaches. Negative for dizziness and light-headedness. Psychiatric/Behavioral: Negative for dysphoric mood and sleep disturbance. The patient is not nervous/anxious.       Visit Vitals  /82 (BP 1 Location: Left arm, BP Patient Position: Sitting)   Pulse 67   Temp 97.5 °F (36.4 °C) (Temporal)   Resp 18   Ht 5' 6\" (1.676 m)   Wt 180 lb 9.6 oz (81.9 kg)   SpO2 97%   BMI 29.15 kg/m²      Physical Exam  Vitals and nursing note reviewed. Constitutional:       General: She is not in acute distress. Appearance: Normal appearance. She is well-developed. She is not diaphoretic. HENT:      Right Ear: External ear normal.      Left Ear: External ear normal.   Eyes:      General: No scleral icterus. Right eye: No discharge. Left eye: No discharge. Extraocular Movements: Extraocular movements intact. Conjunctiva/sclera: Conjunctivae normal.   Cardiovascular:      Rate and Rhythm: Normal rate and regular rhythm. Pulmonary:      Effort: Pulmonary effort is normal.      Breath sounds: Normal breath sounds. No wheezing. Abdominal:      General: Bowel sounds are normal.      Palpations: Abdomen is soft. Tenderness: There is no abdominal tenderness. Musculoskeletal:      Cervical back: Normal range of motion and neck supple. Lymphadenopathy:      Cervical: No cervical adenopathy. Neurological:      Mental Status: She is alert and oriented to person, place, and time. Psychiatric:         Mood and Affect: Mood and affect normal.       An electronic signature was used to authenticate this note.   -- Hermann Schmitz

## 2022-08-11 RX ORDER — ATORVASTATIN CALCIUM 10 MG/1
TABLET, FILM COATED ORAL
Qty: 90 TABLET | Refills: 1 | Status: SHIPPED | OUTPATIENT
Start: 2022-08-11

## 2022-08-12 DIAGNOSIS — I10 PRIMARY HYPERTENSION: ICD-10-CM

## 2022-08-12 RX ORDER — AMLODIPINE BESYLATE 2.5 MG/1
TABLET ORAL
Qty: 30 TABLET | Refills: 1 | Status: SHIPPED | OUTPATIENT
Start: 2022-08-12 | End: 2022-09-06

## 2022-09-06 DIAGNOSIS — I10 PRIMARY HYPERTENSION: ICD-10-CM

## 2022-09-06 RX ORDER — AMLODIPINE BESYLATE 2.5 MG/1
TABLET ORAL
Qty: 30 TABLET | Refills: 1 | Status: SHIPPED | OUTPATIENT
Start: 2022-09-06 | End: 2022-10-02

## 2022-10-02 DIAGNOSIS — I10 PRIMARY HYPERTENSION: ICD-10-CM

## 2022-10-02 RX ORDER — AMLODIPINE BESYLATE 2.5 MG/1
TABLET ORAL
Qty: 30 TABLET | Refills: 1 | Status: SHIPPED | OUTPATIENT
Start: 2022-10-02

## 2022-10-07 NOTE — PROGRESS NOTES
[address] [place]. [place], [address]   Tel.  [phone]   Fax. [address] [facility] [address]   [place], [address]   Tel.  [phone]   Fax. [phone] [address] [facility] [address]   Tel.  [phone]   Fax. Daphnie Farias (: 1955) is a 74 y.o. female, established patient, seen for positive airway pressure follow-up and evaluation. She was last seen by Dr. Christine Hendrix on 2021, prior notes reviewed in detail. In lab sleep test 2017 showed AHI of 9.7/hr with a lowest SaO2 of 88%. She was referred to dentistry for evaluation for OAT. She is seen today for follow up. ASSESSMENT/PLAN:    ICD-10-CM ICD-9-CM    1. SIMRAN (obstructive sleep apnea)  G47.33 327.23 SLEEP STUDY UNATTENDED, 4 CHANNEL      2. Primary hypertension  I10 401.9       3. BMI 29.0-29.9,adult  Z68.29 V85.25         AHI = 9.7 (2017). Sleep Apnea - She stopped using her OAT a few years ago. She has been feeling increasingly more tired during the day. She is retired but has been told of snoring very loud. She reports no weight change. She notes she sleeps well through the night generally. House members note that when she was using her OAT she continued to snore. She does note headaches in the morning on occasion. HSAT ordered for evaluation. Once results available, we will discuss treatment options at either VV or F2F visit. She is open to PAP therapy if apnea has worsened since initial testing. Orders Placed This Encounter    SLEEP STUDY UNATTENDED, 4 CHANNEL     Order Specific Question:   Reason for Exam     Answer:   SIMRAN     * Counseling was provided regarding the importance of ensuring sufficient total sleep time, proper sleep hygiene, and safe driving. 2. Hypertension -  continue on her current regimen, she will continue to monitor her BP and follow up with her PMD for reevaluation/adjustment of medications if warranted.   I have reviewed the relationship between hypertension as it relates to sleep-disordered breathing. 3. Recommended a dedicated weight loss program through appropriate diet and exercise regimen as significant weight reduction has been shown to reduce severity of obstructive sleep apnea. SUBJECTIVE/OBJECTIVE:    Pierre Sleepiness Score: 19 and Modified F.O.S.Q. Score Total / 2: 15.5 which reflects improved sleep quality over therapy time. Sleep Review of Systems: notable for Negative difficulty falling asleep; Negative awakenings at night; occasional early morning headaches; Negative memory problems; Negative concentration issues; Negative chest pain; Negative shortness of breath; Negative significant joint pain at night; Negative significant muscle pain at night; Negative rashes or itching; Negative heartburn; Negative significant mood issues    Visit Vitals  /75 (BP 1 Location: Left upper arm, BP Patient Position: Sitting, BP Cuff Size: Adult)   Pulse 62   Temp 97.8 °F (36.6 °C) (Temporal)   Ht 5' 6\" (1.676 m)   Wt 180 lb (81.6 kg)   SpO2 98%   BMI 29.05 kg/m²        General:   Alert, oriented, not in acute distress   Eyes:  Anicteric Sclerae; no obvious strabismus   Nose:  No obvious nasal septum deviation    Neck:   Midline trachea   Chest/Lungs:  Symmetrical lung expansion, clear lung fields on auscultation    CVS:  Normal rate, regular rhythm,  no JVD   Extremities:  No obvious rashes, no edema    Neuro:  No focal deficits; No obvious tremor    Psych:  Normal affect,  normal countenance     Patient's phone number [phone] (home)  was reviewed and confirmed for accuracy. She gives permission for messages regarding results and appointments to be left at that number.     On this date 10/10/2022 I have spent 20 minutes reviewing previous notes, test results and face to face with the patient discussing the diagnosis and importance of compliance with the treatment plan as well as documenting on the day of the visit. An electronic signature was used to authenticate this note.     -- Vesna Jones NP, [facility]  10/10/22

## 2022-10-10 ENCOUNTER — OFFICE VISIT (OUTPATIENT)
Dept: SLEEP MEDICINE | Age: 67
End: 2022-10-10
Payer: COMMERCIAL

## 2022-10-10 VITALS
DIASTOLIC BLOOD PRESSURE: 75 MMHG | HEIGHT: 66 IN | OXYGEN SATURATION: 98 % | SYSTOLIC BLOOD PRESSURE: 127 MMHG | HEART RATE: 62 BPM | BODY MASS INDEX: 28.93 KG/M2 | WEIGHT: 180 LBS | TEMPERATURE: 97.8 F

## 2022-10-10 DIAGNOSIS — I10 PRIMARY HYPERTENSION: ICD-10-CM

## 2022-10-10 DIAGNOSIS — G47.33 OSA (OBSTRUCTIVE SLEEP APNEA): Primary | ICD-10-CM

## 2022-10-10 PROCEDURE — 99213 OFFICE O/P EST LOW 20 MIN: CPT | Performed by: NURSE PRACTITIONER

## 2022-10-10 PROCEDURE — 1123F ACP DISCUSS/DSCN MKR DOCD: CPT | Performed by: NURSE PRACTITIONER

## 2022-10-10 NOTE — PATIENT INSTRUCTIONS
[address] [facility] [place]. [place], [address]  Tel.  [phone]  Fax. 100 [facility] [address]  [place], [address] [facility]  Tel.  [phone]  Fax. [phone] [address]  Tel.  [phone]  Fax. [phone]     Learning About CPAP for Sleep Apnea  What is CPAP? CPAP is a small machine that you use at home every night while you sleep. It increases air pressure in your throat to keep your airway open. When you have sleep apnea, this can help you sleep better so you feel much better. CPAP stands for \"continuous positive airway pressure. \"  The CPAP machine will have one of the following:  A mask that covers your nose and mouth  Prongs that fit into your nose  A mask that covers your nose only, the most common type. This type is called NCPAP. The N stands for \"nasal.\"  Why is it done? CPAP is usually the best treatment for obstructive sleep apnea. It is the first treatment choice and the most widely used. Your doctor may suggest CPAP if you have: Moderate to severe sleep apnea. Sleep apnea and coronary artery disease (CAD) or heart failure. How does it help? CPAP can help you have more normal sleep, so you feel less sleepy and more alert during the daytime. CPAP may help keep heart failure or other heart problems from getting worse. NCPAP may help lower your blood pressure. If you use CPAP, your bed partner may also sleep better because you are not snoring or restless. What are the side effects? Some people who use CPAP have:  A dry or stuffy nose and a sore throat. Irritated skin on the face. Sore eyes. Bloating. If you have any of these problems, work with your doctor to fix them. Here are some things you can try:  Be sure the mask or nasal prongs fit well. See if your doctor can adjust the pressure of your CPAP. If your nose is dry, try a humidifier.   If your nose is runny or stuffy, try decongestant medicine or a steroid nasal spray. If these things do not help, you might try a different type of machine. Some machines have air pressure that adjusts on its own. Others have air pressures that are different when you breathe in than when you breathe out. This may reduce discomfort caused by too much pressure in your nose. Where can you learn more? Go to Gaosouyi.be  Enter Margot Jacinto in the search box to learn more about \"Learning About CPAP for Sleep Apnea. \"   © 9938-9676 Healthwise, Incorporated. Care instructions adapted under license by [address] [place] [address] [employer] (which disclaims liability or warranty for this information). This care instruction is for use with your licensed healthcare professional. If you have questions about a medical condition or this instruction, always ask your healthcare professional. [address] any warranty or liability for your use of this information. Content Version: 5.1.55229; Last Revised: January 11, 2010  PROPER SLEEP HYGIENE    What to avoid  Do not have drinks with caffeine, such as coffee or black tea, for 8 hours before bed. Do not smoke or use other types of tobacco near bedtime. Nicotine is a stimulant and can keep you awake. Avoid drinking alcohol late in the evening, because it can cause you to wake in the middle of the night. Do not eat a big meal close to bedtime. If you are hungry, eat a light snack. Do not drink a lot of water close to bedtime, because the need to urinate may wake you up during the night. Do not read or watch TV in bed. Use the bed only for sleeping and sexual activity. What to try  Go to bed at the same time every night, and wake up at the same time every morning. Do not take naps during the day. Keep your bedroom quiet, dark, and cool. Get regular exercise, but not within 3 to 4 hours of your bedtime. .  Sleep on a comfortable pillow and mattress.   If watching the clock makes you anxious, turn it facing away from you so you cannot see the time. If you worry when you lie down, start a worry book. Well before bedtime, write down your worries, and then set the book and your concerns aside. Try meditation or other relaxation techniques before you go to bed. If you cannot fall asleep, get up and go to another room until you feel sleepy. Do something relaxing. Repeat your bedtime routine before you go to bed again. Make your house quiet and calm about an hour before bedtime. Turn down the lights, turn off the TV, log off the computer, and turn down the volume on music. This can help you relax after a busy day. Drowsy Driving: The [place] [address] cites drowsiness as a causing factor in more than 027,926 police reported crashes annually, resulting in 76,000 injuries and 1,500 deaths. Other surveys suggest 55% of people polled have driven while drowsy in the past year, 23% had fallen asleep but not crashed, 3% crashed, and 2% had and accident due to drowsy driving. Who is at risk? Young Drivers: One study of drowsy driving accidents states that 55% of the drivers were under 25 years. Of those, 75% were male. Shift Workers and Travelers: People who work overnight or travel across time zones frequently are at higher risk of experiencing Circadian Rhythm Disorders. They are trying to work and function when their body is programed to sleep. Sleep Deprived: Lack of sleep has a serious impact on your ability to pay attention or focus on a task. Consistently getting less than the average of 8 hours your body needs creates partial or cumulative sleep deprivation. Untreated Sleep Disorders: Sleep Apnea, Narcolepsy, R.L.S., and other sleep disorders (untreated) prevent a person from getting enough restful sleep. This leads to excessive daytime sleepiness and increases the risk for drowsy driving accidents by up to 7 times.   Medications / Alcohol: Even over the counter medications can cause drowsiness. Medications that impair a drivers attention should have a warning label. Alcohol naturally makes you sleepy and on its own can cause accidents. Combined with excessive drowsiness its effects are amplified. Signs of Drowsy Driving:   * You don't remember driving the last few miles   * You may drift out of your chichi   * You are unable to focus and your thoughts wander   * You may yawn more often than normal   * You have difficulty keeping your eyes open / nodding off   * Missing traffic signs, speeding, or tailgating  Prevention-   Good sleep hygiene, lifestyle and behavioral choices have the most impact on drowsy driving. There is no substitute for sleep and the average person requires 8 hours nightly. If you find yourself driving drowsy, stop and sleep. Consider the sleep hygiene tips provided during your visit as well. Medication Refill Policy: Refills for all medications require 1 week advance notice. Please have your pharmacy fax a refill request. We are unable to fax, or call in \"controled substance\" medications and you will need to pick these prescriptions up from our office. [employer] Activation    Thank you for requesting access to [employer]. Please follow the instructions below to securely access and download your online medical record. [employer]Sand allows you to send messages to your doctor, view your test results, renew your prescriptions, schedule appointments, and more. How Do I Sign Up? In your internet browser, go to [email or web address] Click on the First Time User? Click Here link in the Sign In box. You will see the New Member Sign Up page. Enter your [employer] Access Code exactly as it appears below. You will not need to use this code after youve completed the sign-up process. If you do not sign up before the expiration date, you must request a new code. [employer]Sand Access Code:  Activation code not generated  Current [employer] Status: Active (This is the date your True Fit access code will )    Enter the last four digits of your Social Security Number (xxxx) and Date of Birth (mm/dd/yyyy) as indicated and click Submit. You will be taken to the next sign-up page. Create a True Fit ID. This will be your True Fit login ID and cannot be changed, so think of one that is secure and easy to remember. Create a True Fit password. You can change your password at any time. Enter your Password Reset Question and Answer. This can be used at a later time if you forget your password. Enter your e-mail address. You will receive e-mail notification when new information is available in [address] Click Sign Up. You can now view and download portions of your medical record. Click the [employer] link to download a portable copy of your medical information. Additional Information    If you have questions, please call [phone]. Remember, True Fit is NOT to be used for urgent needs. For medical emergencies, dial 911.

## 2022-11-15 LAB — MAMMOGRAPHY, EXTERNAL: NORMAL

## 2022-11-28 NOTE — PROGRESS NOTES
Written by Jaylyn Ng, as dictated by Emilia Chavira MD.   ASSESSMENT and PLAN  Diagnoses and all orders for this visit:    1. Physical exam  Pt presents today for a CPE, which I performed. All findings were normal. Pt will have updated lab work performed during today's [address]; Future  -     CBC W/O DIFF; Future  -     TSH 3RD GENERATION; Future    2. Need for vaccination against Streptococcus pneumoniae  She is due for pneumonia vaccine-- prescription given. She will get it next week. -     pneumococcal 20-antony conj-dip, PF, (PREVNAR 20) 0.5 mL syrg injection; 0.5 mL by IntraMUSCular route once for 1 dose. 3. Mixed hyperlipidemia  Continue with Lipitor 10mg daily. Recheck lipid panel.   -     LIPID PANEL; Future    4. Osteopenia of multiple sites  I advised her to start doing weight bearing exercise. Continue with Vit D and calcium supplement. 5. Gastroesophageal reflux disease without esophagitis  I advised her that omeprazole works best in the morning on an empty stomach. I encouraged her to instead try taking Pepcid at night (prescription sent). Potential side effects were discussed. -     famotidine (PEPCID) 40 mg tablet; Take 1 Tablet by mouth nightly for 30 days. I advised pt to use debrox BID x 2-3 days to help remove wax in ears. HISTORY OF PRESENT ILLNESS  Jalen Dyson is a 74 y.o. female. Here today for a physical. She is fasting today for labs. BP today is 130/79. Pt continues with amlodipine 2.5 mg daily. She is on Celexa 10 mg daily for anxiety which has been working well. Lipid panel on 11/30/21 notable for total cholesterol 209, HDL 44, , and triglycerides 149. Pt is taking Lipitor 10mg daily. She walks daily for exercise (at least 5K steps). DEXA in 2021 showed osteopenia. She takes a Vit D and calcium supplement. She was seen by the sleep center and had sleep study last night.  She is waiting for the results. Pt reports frequent heartburn with most foods. She takes omeprazole as needed which helps. She had mammogram this month. She sees a GYN for pap smears. She had colonoscopy in 2020 with Dr. Prerna Stone, polyps were found. She will try to have records from GYN and gastro sent to our office. She is due for pneumonia vaccine-- prescription given. She has had 5 COVID vaccines-- last was 2-3 weeks ago. She has had annual flu vaccine. Patient Active Problem List   Diagnosis Code    History of breast cancer Z85.3    S/P cholecystectomy Z90.49    S/P tubal ligation Z98.51    H/O bone density study Z92.89    Obesity (BMI 30-39. 9) E66.9    Obstructive sleep apnea syndrome G47.33    Advance directive discussed with patient Z71.89    Fatty liver K76.0    Mixed hyperlipidemia E78.2    Lesion of soft tissue M79.9    S/P repair of ventral hernia Z98.890, Z87.19    Osteopenia of multiple sites M85.89        Current Outpatient Medications on File Prior to Visit   Medication Sig Dispense Refill    citalopram (CELEXA) 10 mg tablet TAKE 1 TABLET BY MOUTH EVERY DAY 90 Tablet 0    amLODIPine (NORVASC) 2.5 mg tablet TAKE 1 TABLET BY MOUTH EVERY DAY 30 Tablet 1    atorvastatin (LIPITOR) 10 mg tablet TAKE 1 TABLET BY MOUTH EVERY DAY IN THE EVENING 90 Tablet 1    vitamin e (E GEMS) 1,000 unit capsule Take 1,000 Units by mouth daily. OMEPRAZOLE (PRILOSEC PO) Take  by mouth daily as needed. CALCIUM CARBONATE/VITAMIN D3 (CALTRATE 600 + D PO) Take  by mouth daily. CHOLECALCIFEROL, VITAMIN D3, (VITAMIN D3 PO) Take 1,000 Units by mouth daily. No current facility-administered medications on file prior to visit.        No Known Allergies    Past Medical History:   Diagnosis Date    Cancer ([facility] [address] 2005    breast (right)    Ill-defined condition     migraines    Lipoma 11/18/2013       Past Surgical History:   Procedure Laterality Date    HX BREAST LUMPECTOMY      s/p RTx (x7 weeks)    HX CHOLECYSTECTOMY      HX COLONOSCOPY   & 2/27/15    polyps 1st time, 2nd time normal - repeat 5 years - Dr. Ozzy Kline    HX UROLOGICAL  2013    sling       Family History   Problem Relation Age of Onset    Hypertension Mother     Cancer Mother         lung, liver, metastatic    Diabetes Sister     Hypertension Sister     Hypertension Brother     Coronary Art Dis Father         since age 42's    Heart Attack Father     Heart Disease Father     Cancer Maternal Grandmother         UNKNOWN    Heart Disease Maternal Grandfather     Cancer Paternal Grandmother     Bleeding Prob Paternal Grandmother         stomach    Lung Disease Paternal Grandfather     Hypertension Brother     Anesth Problems Neg Hx        Social History     Socioeconomic History    Marital status:      Spouse name: Not on file    Number of children: Not on file    Years of education: Not on file    Highest education level: Not on file   Occupational History    Not on file   Tobacco Use    Smoking status: Former     Packs/day: 0.50     Years: 15.00     Pack years: 7.50     Types: Cigarettes     Quit date: 1993     Years since quittin.9    Smokeless tobacco: Never   Vaping Use    Vaping Use: Never used   Substance and Sexual Activity    Alcohol use: Yes     Comment: rarely    Drug use: No    Sexual activity: Yes     Partners: Male     Birth control/protection: None   Other Topics Concern    Not on file   Social History Narrative    Not on file     Social Determinants of Health     Financial Resource Strain: Unknown    Difficulty of Paying Living Expenses: Patient refused   Food Insecurity: Unknown    Worried About Running Out of Food in the Last Year: Patient refused    Ran Out of Food in the Last Year: Patient refused   Transportation Needs: Not on file   Physical Activity: Not on file   Stress: Not on file   Social Connections: Not on file   Intimate Partner Violence: Not on file   Housing Stability: Not on file       No visits with results within 3 Month(s) from this visit. Latest known visit with results is:   Admission on 02/26/2022, Discharged on 02/26/2022   Component Date Value Ref Range Status    SAMPLES BEING HELD 02/26/2022 RED, B/C   Final    COMMENT 02/26/2022 Add-on orders for these samples will be processed based on acceptable specimen integrity and analyte stability, which may vary by analyte. Final    WBC 02/26/2022 8.2  3.6 - 11.0 K/uL Final    RBC 02/26/2022 4.92  3.80 - 5.20 M/uL Final    HGB 02/26/2022 15.3  11.5 - 16.0 g/dL Final    HCT 02/26/2022 45.8  35.0 - 47.0 % Final    MCV 02/26/2022 93.1  80.0 - 99.0 FL Final    MCH 02/26/2022 31.1  26.0 - 34.0 PG Final    MCHC 02/26/2022 33.4  30.0 - 36.5 g/dL Final    RDW 02/26/2022 12.1  11.5 - 14.5 % Final    PLATELET 18/78/2929 170  150 - 400 K/uL Final    MPV 02/26/2022 10.7  8.9 - 12.9 FL Final    NRBC 02/26/2022 0.0  0  WBC Final    ABSOLUTE NRBC 02/26/2022 0.00  0.00 - 0.01 K/uL Final    NEUTROPHILS 02/26/2022 72  32 - 75 % Final    LYMPHOCYTES 02/26/2022 20  12 - 49 % Final    MONOCYTES 02/26/2022 7  5 - 13 % Final    EOSINOPHILS 02/26/2022 1  0 - 7 % Final    BASOPHILS 02/26/2022 1  0 - 1 % Final    IMMATURE GRANULOCYTES 02/26/2022 0  0.0 - 0.5 % Final    ABS. NEUTROPHILS 02/26/2022 5.9  1.8 - 8.0 K/UL Final    ABS. LYMPHOCYTES 02/26/2022 1.6  0.8 - 3.5 K/UL Final    ABS. MONOCYTES 02/26/2022 0.5  0.0 - 1.0 K/UL Final    ABS. EOSINOPHILS 02/26/2022 0.0  0.0 - 0.4 K/UL Final    ABS. BASOPHILS 02/26/2022 0.1  0.0 - 0.1 K/UL Final    ABS. IMM.  GRANS. 02/26/2022 0.0  0.00 - 0.04 K/UL Final    DF 02/26/2022 AUTOMATED    Final    Sodium 02/26/2022 137  136 - 145 mmol/L Final    Potassium 02/26/2022 3.9  3.5 - 5.1 mmol/L Final    Chloride 02/26/2022 99  97 - 108 mmol/L Final    CO2 02/26/2022 27  21 - 32 mmol/L Final    Anion gap 02/26/2022 11  5 - 15 mmol/L Final    Glucose 02/26/2022 106 (A)  65 - 100 mg/dL Final    BUN 02/26/2022 8 6 - 20 MG/DL Final    Creatinine 02/26/2022 0.83  0.55 - 1.02 MG/DL Final    BUN/Creatinine ratio 02/26/2022 10 (A)  12 - 20   Final    GFR est AA 02/26/2022 >60  >60 ml/min/1.73m2 Final    GFR est non-AA 02/26/2022 >60  >60 ml/min/1.73m2 Final    Calcium 02/26/2022 9.1  8.5 - 10.1 MG/DL Final    Bilirubin, total 02/26/2022 0.6  0.2 - 1.0 MG/DL Final    ALT (SGPT) 02/26/2022 27  12 - 78 U/L Final    AST (SGOT) 02/26/2022 16  15 - 37 U/L Final    Alk. phosphatase 02/26/2022 140 (A)  45 - 117 U/L Final    Protein, total 02/26/2022 7.7  6.4 - 8.2 g/dL Final    Albumin 02/26/2022 4.0  3.5 - 5.0 g/dL Final    Globulin 02/26/2022 3.7  2.0 - 4.0 g/dL Final    A-G Ratio 02/26/2022 1.1  1.1 - 2.2   Final    Lipase 02/26/2022 148  73 - 393 U/L Final    Color 02/26/2022 YELLOW/STRAW    Final    Color Reference Range: Straw, Yellow or Dark Yellow    Appearance 02/26/2022 CLEAR  CLEAR   Final    Specific gravity 02/26/2022 1.015  1.003 - 1.030   Final    pH (UA) 02/26/2022 6.5  5.0 - 8.0   Final    Protein 02/26/2022 Negative  NEG mg/dL Final    Glucose 02/26/2022 Negative  NEG mg/dL Final    Ketone 02/26/2022 Negative  NEG mg/dL Final    Bilirubin 02/26/2022 Negative  NEG   Final    Blood 02/26/2022 TRACE (A)  NEG   Final    Urobilinogen 02/26/2022 1.0  0.2 - 1.0 EU/dL Final    Nitrites 02/26/2022 Negative  NEG   Final    Leukocyte Esterase 02/26/2022 Negative  NEG   Final    WBC 02/26/2022 0-4  0 - 4 /hpf Final    RBC 02/26/2022 5-10  0 - 5 /hpf Final    Epithelial cells 02/26/2022 FEW  FEW /lpf Final    Epithelial cell category consists of squamous cells and /or transitional urothelial cells. Renal tubular cells, if present, are separately identified as such. Bacteria 02/26/2022 Negative  NEG /hpf Final    Mucus 02/26/2022 TRACE (A)  NEG /lpf Final    Hyaline cast 02/26/2022 10-20  0 - 5 /lpf Final    Urine culture hold 02/26/2022 Urine on hold in Microbiology dept for 2 days.   If unpreserved urine is submitted, it cannot be used for addtional testing after 24 hours, recollection will be required. Final       Review of Systems   Constitutional:  Negative for malaise/fatigue and weight loss. HENT:  Negative for congestion and sore throat. Eyes:  Negative for blurred vision. Respiratory:  Negative for cough and shortness of breath. Cardiovascular:  Negative for chest pain and leg swelling. Gastrointestinal:  Positive for heartburn. Negative for constipation. Genitourinary:  Negative for frequency and urgency. Musculoskeletal:  Negative for back pain, joint pain and myalgias. Neurological:  Negative for dizziness and headaches. Psychiatric/Behavioral:  Negative for depression. The patient is not nervous/anxious and does not have insomnia. Visit Vitals  /79 (BP 1 Location: Left upper arm, BP Patient Position: Sitting)   Pulse 63   Temp 97.3 °F (36.3 °C) (Temporal)   Resp 18   Ht 5' 6\" (1.676 m)   Wt 181 lb 9.6 oz (82.4 kg)   SpO2 97%   BMI 29.31 kg/m²     Physical Exam  Vitals and nursing note reviewed. Constitutional:       General: She is not in acute distress. Appearance: Normal appearance. She is well-developed. She is not diaphoretic. HENT:      Right Ear: Tympanic membrane, ear canal and external ear normal.      Left Ear: Tympanic membrane, ear canal and external ear normal.      Ears:      Comments: Wax noted in right ear  Eyes:      General: No scleral icterus. Right eye: No discharge. Left eye: No discharge. Extraocular Movements: Extraocular movements intact. Conjunctiva/sclera: Conjunctivae normal.   Neck:      Thyroid: No thyromegaly. Cardiovascular:      Rate and Rhythm: Normal rate and regular rhythm. Pulses: Normal pulses. Dorsalis pedis pulses are 2+ on the right side and 2+ on the left side. Pulmonary:      Effort: Pulmonary effort is normal.      Breath sounds: Normal breath sounds. No wheezing.    Abdominal:      General: Bowel sounds are normal. There is no distension. Palpations: Abdomen is soft. Musculoskeletal:      Cervical back: Normal range of motion and neck supple. Right knee: Crepitus present. Left knee: Crepitus present. Right lower leg: No edema. Left lower leg: No edema. Lymphadenopathy:      Cervical: No cervical adenopathy. Neurological:      Mental Status: She is alert and oriented to person, place, and time. Deep Tendon Reflexes:      Reflex Scores:       Patellar reflexes are 2+ on the right side and 2+ on the left side.   Psychiatric:         Mood and Affect: Mood and affect normal.

## 2022-11-29 ENCOUNTER — HOSPITAL ENCOUNTER (OUTPATIENT)
Dept: SLEEP MEDICINE | Age: 67
Discharge: HOME | End: 2022-11-29
Payer: COMMERCIAL

## 2022-11-29 ENCOUNTER — OFFICE VISIT (OUTPATIENT)
Dept: SLEEP MEDICINE | Age: 67
End: 2022-11-29

## 2022-11-29 DIAGNOSIS — I10 PRIMARY HYPERTENSION: ICD-10-CM

## 2022-11-29 DIAGNOSIS — G47.33 OSA (OBSTRUCTIVE SLEEP APNEA): Primary | ICD-10-CM

## 2022-11-29 PROCEDURE — 95806 SLEEP STUDY UNATT&RESP EFFT: CPT | Performed by: INTERNAL MEDICINE

## 2022-11-29 RX ORDER — CITALOPRAM 10 MG/1
TABLET ORAL
Qty: 90 TABLET | Refills: 0 | Status: SHIPPED | OUTPATIENT
Start: 2022-11-29

## 2022-11-29 NOTE — PROGRESS NOTES
S>Jalen LOCO Phillip Mathews is a 74 y.o. female seen today to receive a home sleep testing unit (HST). Patient was educated on proper hookup and operation of the HST via detailed instruction sheet (per COVID-19 precautions)  Instruction forms with after hours contact and documentation were signed. O>    There were no vitals taken for this visit. A>  No diagnosis found. P>  General information regarding operations and maintenance of the device was provided. Follow-up appointment was made to return the HST. She will be contacted once the results have been reviewed. She was asked to contact our office for any problems regarding her home sleep test study.

## 2022-11-30 ENCOUNTER — TELEPHONE (OUTPATIENT)
Dept: SLEEP MEDICINE | Age: 67
End: 2022-11-30

## 2022-11-30 ENCOUNTER — OFFICE VISIT (OUTPATIENT)
Dept: PRIMARY CARE CLINIC | Age: 67
End: 2022-11-30
Payer: COMMERCIAL

## 2022-11-30 VITALS
HEART RATE: 63 BPM | OXYGEN SATURATION: 97 % | HEIGHT: 66 IN | BODY MASS INDEX: 29.18 KG/M2 | DIASTOLIC BLOOD PRESSURE: 79 MMHG | SYSTOLIC BLOOD PRESSURE: 130 MMHG | WEIGHT: 181.6 LBS | TEMPERATURE: 97.3 F | RESPIRATION RATE: 18 BRPM

## 2022-11-30 DIAGNOSIS — Z00.00 PHYSICAL EXAM: Primary | ICD-10-CM

## 2022-11-30 DIAGNOSIS — K21.9 GASTROESOPHAGEAL REFLUX DISEASE WITHOUT ESOPHAGITIS: ICD-10-CM

## 2022-11-30 DIAGNOSIS — M85.89 OSTEOPENIA OF MULTIPLE SITES: ICD-10-CM

## 2022-11-30 DIAGNOSIS — E78.2 MIXED HYPERLIPIDEMIA: ICD-10-CM

## 2022-11-30 DIAGNOSIS — G47.33 OSA (OBSTRUCTIVE SLEEP APNEA): Primary | ICD-10-CM

## 2022-11-30 DIAGNOSIS — Z23 NEED FOR VACCINATION AGAINST STREPTOCOCCUS PNEUMONIAE: ICD-10-CM

## 2022-11-30 PROCEDURE — 99397 PER PM REEVAL EST PAT 65+ YR: CPT | Performed by: INTERNAL MEDICINE

## 2022-11-30 RX ORDER — FAMOTIDINE 40 MG/1
40 TABLET, FILM COATED ORAL
Qty: 30 TABLET | Refills: 0 | Status: SHIPPED | OUTPATIENT
Start: 2022-11-30 | End: 2022-12-30

## 2022-11-30 NOTE — PROGRESS NOTES
Chief Complaint   Patient presents with    Complete Physical       Visit Vitals  /79 (BP 1 Location: Left upper arm, BP Patient Position: Sitting)   Pulse 63   Temp 97.3 °F (36.3 °C) (Temporal)   Resp 18   Ht 5' 6\" (1.676 m)   Wt 181 lb 9.6 oz (82.4 kg)   SpO2 97%   BMI 29.31 kg/m²       1. Have you been to the ER, urgent care clinic since your last visit? Hospitalized since your last visit? No    2. Have you seen or consulted any other health care providers outside of the [address] [place] [address] since your last visit? Include any pap smears or colon screening. Yes Where: [facility] - mammogram November 2022  Dr. Kasi Sultana [place]      3. For patients aged 39-70: Has the patient had a colonoscopy / FIT/ Cologuard? Yes - Care Gap present. Most recent result on file      If the patient is female:    4. For patients aged 41-77: Has the patient had a mammogram within the past 2 years? Yes - Care Gap present. Most recent result on file      5. For patients aged 21-65: Has the patient had a pap smear? Yes - Care Gap present.  Rooming MA/LPN to request most recent results

## 2022-12-01 LAB
ALBUMIN SERPL-MCNC: 4.5 G/DL (ref 3.8–4.8)
ALBUMIN/GLOB SERPL: 2.8 {RATIO} (ref 1.2–2.2)
ALP SERPL-CCNC: 123 IU/L (ref 44–121)
ALT SERPL-CCNC: 26 IU/L (ref 0–32)
AST SERPL-CCNC: 19 IU/L (ref 0–40)
BILIRUB SERPL-MCNC: 0.4 MG/DL (ref 0–1.2)
BUN SERPL-MCNC: 13 MG/DL (ref 8–27)
BUN/CREAT SERPL: 19 (ref 12–28)
CALCIUM SERPL-MCNC: 9.5 MG/DL (ref 8.7–10.3)
CHLORIDE SERPL-SCNC: 106 MMOL/L (ref 96–106)
CHOLEST SERPL-MCNC: 171 MG/DL (ref 100–199)
CO2 SERPL-SCNC: 26 MMOL/L (ref 20–29)
CREAT SERPL-MCNC: 0.7 MG/DL (ref 0.57–1)
EGFR: 96 ML/MIN/1.73
ERYTHROCYTE [DISTWIDTH] IN BLOOD BY AUTOMATED COUNT: 12.3 % (ref 11.7–15.4)
GLOBULIN SER CALC-MCNC: 1.6 G/DL (ref 1.5–4.5)
GLUCOSE SERPL-MCNC: 106 MG/DL (ref 70–99)
HCT VFR BLD AUTO: 44.6 % (ref 34–46.6)
HDLC SERPL-MCNC: 51 MG/DL
HGB BLD-MCNC: 14.8 G/DL (ref 11.1–15.9)
LDLC SERPL CALC-MCNC: 102 MG/DL (ref 0–99)
MCH RBC QN AUTO: 30.8 PG (ref 26.6–33)
MCHC RBC AUTO-ENTMCNC: 33.2 G/DL (ref 31.5–35.7)
MCV RBC AUTO: 93 FL (ref 79–97)
PLATELET # BLD AUTO: 246 X10E3/UL (ref 150–450)
POTASSIUM SERPL-SCNC: 3.9 MMOL/L (ref 3.5–5.2)
PROT SERPL-MCNC: 6.1 G/DL (ref 6–8.5)
RBC # BLD AUTO: 4.8 X10E6/UL (ref 3.77–5.28)
SODIUM SERPL-SCNC: 143 MMOL/L (ref 134–144)
TRIGL SERPL-MCNC: 101 MG/DL (ref 0–149)
TSH SERPL DL<=0.005 MIU/L-ACNC: 4.25 UIU/ML (ref 0.45–4.5)
VLDLC SERPL CALC-MCNC: 18 MG/DL (ref 5–40)
WBC # BLD AUTO: 4.9 X10E3/UL (ref 3.4–10.8)

## 2022-12-04 NOTE — PROGRESS NOTES
Results reviewed. Release via [employer], hope you are enjoying your weekend. Your blood report is back and I am happy to see your cholesterol numbers as they have improved. Your TSH ( thyroid number) has gone up but still in the normal range. I would recommend continuing low carb diet and exercise 3-4 times a week  or daily walk.

## 2022-12-06 ENCOUNTER — TELEPHONE (OUTPATIENT)
Dept: SLEEP MEDICINE | Age: 67
End: 2022-12-06

## 2022-12-06 ENCOUNTER — DOCUMENTATION ONLY (OUTPATIENT)
Dept: SLEEP MEDICINE | Age: 67
End: 2022-12-06

## 2022-12-06 ENCOUNTER — PATIENT MESSAGE (OUTPATIENT)
Dept: SLEEP MEDICINE | Age: 67
End: 2022-12-06

## 2022-12-06 DIAGNOSIS — G47.33 OSA (OBSTRUCTIVE SLEEP APNEA): Primary | ICD-10-CM

## 2022-12-06 NOTE — TELEPHONE ENCOUNTER
Called patient to inform her of the DME company, patient stated she would whether use the oral appliance instead of the pap device if possible. Would like a callback to clarify.

## 2022-12-06 NOTE — TELEPHONE ENCOUNTER
Nhora P Catarina Homans underwent Sleep Testing which was indicative of an average AHI of 23.3 per hour with an SpO2 solis of 82% and SpO2 of < 88% being 11.1 minutes. An APAP prescription has been written and patient will be contacted by office staff regarding follow-up  in 2-3 months after initiation of therapy. Encounter Diagnosis   Name Primary? SIMRAN (obstructive sleep apnea) Yes       Orders Placed This Encounter    AMB SUPPLY ORDER     Diagnosis: Obstructive Sleep Apnea ICD-10 Code (G47.33)    Positive Airway Pressure Therapy: Duration of need: 99 months. APAP Device with Heated Humidifer [id number] / [id number]. Minimum Pressure: 06 cmH2O, Maximum Pressure: 15 cmH2O.     Nasal Pillows Combo Mask (Replace) 2 per month.  Nasal Pillows (Replace) 2 per month.  Full Face Mask 1 every 3 months.  Full Face Mask Cushion 1 per month.  Nasal Cushion (Replace) 2 per month.  Nasal Interface Mask 1 every 3 months.  Headgear 1 every 6 months.  Chinstrap 1 every 6 months.  Tubing 1 every 3 months.  Filter(s) Disposable 2 per month.  Filter(s) Non-Disposable 1 every 6 months. .   [address] for Humidifier (Replace) 1 every 6 months. Perform Mask Fitting per patient preference and comfort - replace as above. Zoey Renee MD, FAASM; NPI: [id number]  Electronically signed. 12/06/22

## 2022-12-07 ENCOUNTER — DOCUMENTATION ONLY (OUTPATIENT)
Dept: SLEEP MEDICINE | Age: 67
End: 2022-12-07

## 2022-12-07 NOTE — TELEPHONE ENCOUNTER
Faxed Referral to Dentistry_ Dr. Felix Phillips DDS. Called patient to inform to call Dr. Pura Jules to schedule an appt.

## 2022-12-07 NOTE — TELEPHONE ENCOUNTER
Results of sleep testing reviewed with patient as were the treatment options. She stated that she would like to go with the convenience of OAT.     Orders Placed This Encounter    REFERRAL TO DENTISTRY     Referral Priority:   Routine     Referral Type:   Consultation     Referral Reason:   Specialty Services Required     Referred to Provider:   Makayla Flores DDS     Number of Visits Requested:   1

## 2022-12-14 DIAGNOSIS — I10 PRIMARY HYPERTENSION: ICD-10-CM

## 2022-12-14 RX ORDER — AMLODIPINE BESYLATE 2.5 MG/1
TABLET ORAL
Qty: 30 TABLET | Refills: 1 | Status: SHIPPED | OUTPATIENT
Start: 2022-12-14

## 2022-12-22 DIAGNOSIS — K21.9 GASTROESOPHAGEAL REFLUX DISEASE WITHOUT ESOPHAGITIS: ICD-10-CM

## 2022-12-22 RX ORDER — FAMOTIDINE 40 MG/1
TABLET, FILM COATED ORAL
Qty: 30 TABLET | Refills: 0 | Status: SHIPPED | OUTPATIENT
Start: 2022-12-22

## 2023-01-10 DIAGNOSIS — I10 PRIMARY HYPERTENSION: ICD-10-CM

## 2023-01-10 DIAGNOSIS — K21.9 GASTROESOPHAGEAL REFLUX DISEASE WITHOUT ESOPHAGITIS: ICD-10-CM

## 2023-01-10 RX ORDER — AMLODIPINE BESYLATE 2.5 MG/1
TABLET ORAL
Qty: 30 TABLET | Refills: 1 | Status: SHIPPED | OUTPATIENT
Start: 2023-01-10

## 2023-01-10 NOTE — TELEPHONE ENCOUNTER
Pharmacy requested 90-day refill. PCP: Azalia Barrett MD    Last appt: 11/30/2022  No future appointments.     Requested Prescriptions     Pending Prescriptions Disp Refills    famotidine (PEPCID) 40 mg tablet 30 Tablet 0       Prior labs and Blood pressures:  BP Readings from Last 3 Encounters:   11/30/22 130/79   10/10/22 127/75   06/14/22 132/82     Lab Results   Component Value Date/Time    Sodium 143 11/30/2022 09:20 AM    Potassium 3.9 11/30/2022 09:20 AM    Chloride 106 11/30/2022 09:20 AM    CO2 26 11/30/2022 09:20 AM    Anion gap 11 02/26/2022 07:57 AM    Glucose 106 (H) 11/30/2022 09:20 AM    BUN 13 11/30/2022 09:20 AM    Creatinine 0.70 11/30/2022 09:20 AM    BUN/Creatinine ratio 19 11/30/2022 09:20 AM    GFR est AA >60 02/26/2022 07:57 AM    GFR est non-AA >60 02/26/2022 07:57 AM    Calcium 9.5 11/30/2022 09:20 AM

## 2023-01-13 RX ORDER — FAMOTIDINE 40 MG/1
40 TABLET, FILM COATED ORAL
Qty: 90 TABLET | Refills: 0 | Status: SHIPPED | OUTPATIENT
Start: 2023-01-13 | End: 2023-04-13

## 2023-02-07 DIAGNOSIS — I10 PRIMARY HYPERTENSION: ICD-10-CM

## 2023-02-07 RX ORDER — AMLODIPINE BESYLATE 2.5 MG/1
TABLET ORAL
Qty: 30 TABLET | Refills: 1 | Status: SHIPPED | OUTPATIENT
Start: 2023-02-07

## 2023-02-12 RX ORDER — ATORVASTATIN CALCIUM 10 MG/1
TABLET, FILM COATED ORAL
Qty: 90 TABLET | Refills: 1 | Status: SHIPPED | OUTPATIENT
Start: 2023-02-12

## 2023-02-24 RX ORDER — CITALOPRAM 10 MG/1
TABLET ORAL
Qty: 90 TABLET | Refills: 0 | Status: SHIPPED | OUTPATIENT
Start: 2023-02-24

## 2023-03-11 DIAGNOSIS — I10 PRIMARY HYPERTENSION: ICD-10-CM

## 2023-03-11 RX ORDER — AMLODIPINE BESYLATE 2.5 MG/1
TABLET ORAL
Qty: 30 TABLET | Refills: 1 | Status: SHIPPED | OUTPATIENT
Start: 2023-03-11

## 2023-05-03 DIAGNOSIS — I10 PRIMARY HYPERTENSION: ICD-10-CM

## 2023-05-03 RX ORDER — AMLODIPINE BESYLATE 2.5 MG/1
TABLET ORAL
Qty: 30 TABLET | Refills: 1 | Status: SHIPPED | OUTPATIENT
Start: 2023-05-03

## 2023-05-27 RX ORDER — CITALOPRAM 10 MG/1
TABLET ORAL
Qty: 90 TABLET | Refills: 0 | Status: SHIPPED | OUTPATIENT
Start: 2023-05-27

## 2023-06-07 DIAGNOSIS — I10 ESSENTIAL (PRIMARY) HYPERTENSION: ICD-10-CM

## 2023-06-07 RX ORDER — AMLODIPINE BESYLATE 2.5 MG/1
TABLET ORAL
Qty: 30 TABLET | Refills: 0 | Status: SHIPPED | OUTPATIENT
Start: 2023-06-07 | End: 2023-07-02

## 2023-06-22 ENCOUNTER — TELEPHONE (OUTPATIENT)
Age: 68
End: 2023-06-22

## 2023-06-22 DIAGNOSIS — G47.33 OSA (OBSTRUCTIVE SLEEP APNEA): Primary | ICD-10-CM

## 2023-06-22 NOTE — TELEPHONE ENCOUNTER
Encounter Diagnosis   Name Primary? THAIS (obstructive sleep apnea) Yes         Orders Placed This Encounter   Procedures    SLEEP STUDY UNATTENDED, 4 CHANNEL     Standing Status:   Future     Standing Expiration Date:   6/22/2024     Scheduling Instructions:      Perform testing with Oral Appliance in use.

## 2023-07-01 DIAGNOSIS — I10 ESSENTIAL (PRIMARY) HYPERTENSION: ICD-10-CM

## 2023-07-02 RX ORDER — AMLODIPINE BESYLATE 2.5 MG/1
TABLET ORAL
Qty: 30 TABLET | Refills: 0 | Status: SHIPPED | OUTPATIENT
Start: 2023-07-02

## 2023-07-18 ENCOUNTER — HOSPITAL ENCOUNTER (OUTPATIENT)
Facility: HOSPITAL | Age: 68
Discharge: HOME | End: 2023-07-21
Payer: COMMERCIAL

## 2023-07-18 DIAGNOSIS — G47.33 OSA (OBSTRUCTIVE SLEEP APNEA): ICD-10-CM

## 2023-07-18 PROCEDURE — G0399 HOME SLEEP TEST/TYPE 3 PORTA: HCPCS | Performed by: INTERNAL MEDICINE

## 2023-07-25 ENCOUNTER — TELEPHONE (OUTPATIENT)
Age: 68
End: 2023-07-25

## 2023-07-27 DIAGNOSIS — I10 ESSENTIAL (PRIMARY) HYPERTENSION: ICD-10-CM

## 2023-07-28 ENCOUNTER — OFFICE VISIT (OUTPATIENT)
Age: 68
End: 2023-07-28
Payer: COMMERCIAL

## 2023-07-28 ENCOUNTER — TELEPHONE (OUTPATIENT)
Age: 68
End: 2023-07-28

## 2023-07-28 VITALS
BODY MASS INDEX: 28.93 KG/M2 | HEIGHT: 66 IN | DIASTOLIC BLOOD PRESSURE: 73 MMHG | RESPIRATION RATE: 18 BRPM | OXYGEN SATURATION: 96 % | HEART RATE: 69 BPM | SYSTOLIC BLOOD PRESSURE: 122 MMHG | TEMPERATURE: 97.5 F | WEIGHT: 180 LBS

## 2023-07-28 DIAGNOSIS — E78.2 MIXED HYPERLIPIDEMIA: ICD-10-CM

## 2023-07-28 DIAGNOSIS — F41.1 GENERALIZED ANXIETY DISORDER: ICD-10-CM

## 2023-07-28 DIAGNOSIS — K76.0 FATTY LIVER: ICD-10-CM

## 2023-07-28 DIAGNOSIS — R19.4 CHANGE IN BOWEL HABITS: ICD-10-CM

## 2023-07-28 DIAGNOSIS — G47.33 OSA (OBSTRUCTIVE SLEEP APNEA): Primary | ICD-10-CM

## 2023-07-28 DIAGNOSIS — Z85.3 HISTORY OF BREAST CANCER: ICD-10-CM

## 2023-07-28 DIAGNOSIS — K21.9 GASTROESOPHAGEAL REFLUX DISEASE WITHOUT ESOPHAGITIS: ICD-10-CM

## 2023-07-28 DIAGNOSIS — I10 ESSENTIAL HYPERTENSION: ICD-10-CM

## 2023-07-28 DIAGNOSIS — M85.89 OSTEOPENIA OF MULTIPLE SITES: ICD-10-CM

## 2023-07-28 DIAGNOSIS — G47.33 OBSTRUCTIVE SLEEP APNEA SYNDROME: ICD-10-CM

## 2023-07-28 PROBLEM — E66.9 OBESITY (BMI 30-39.9): Status: RESOLVED | Noted: 2017-02-28 | Resolved: 2023-07-28

## 2023-07-28 PROBLEM — Z98.890 S/P REPAIR OF VENTRAL HERNIA: Status: RESOLVED | Noted: 2022-06-14 | Resolved: 2023-07-28

## 2023-07-28 PROBLEM — M79.9 LESION OF SOFT TISSUE: Status: RESOLVED | Noted: 2022-04-25 | Resolved: 2023-07-28

## 2023-07-28 PROBLEM — M79.9 LESION OF SOFT TISSUE: Status: ACTIVE | Noted: 2022-04-25

## 2023-07-28 PROBLEM — Z87.19 S/P REPAIR OF VENTRAL HERNIA: Status: RESOLVED | Noted: 2022-06-14 | Resolved: 2023-07-28

## 2023-07-28 PROCEDURE — 99204 OFFICE O/P NEW MOD 45 MIN: CPT | Performed by: INTERNAL MEDICINE

## 2023-07-28 PROCEDURE — 3078F DIAST BP <80 MM HG: CPT | Performed by: INTERNAL MEDICINE

## 2023-07-28 PROCEDURE — 1123F ACP DISCUSS/DSCN MKR DOCD: CPT | Performed by: INTERNAL MEDICINE

## 2023-07-28 PROCEDURE — 3074F SYST BP LT 130 MM HG: CPT | Performed by: INTERNAL MEDICINE

## 2023-07-28 RX ORDER — AMLODIPINE BESYLATE 2.5 MG/1
TABLET ORAL
Qty: 30 TABLET | Refills: 0 | OUTPATIENT
Start: 2023-07-28

## 2023-07-28 SDOH — ECONOMIC STABILITY: INCOME INSECURITY: HOW HARD IS IT FOR YOU TO PAY FOR THE VERY BASICS LIKE FOOD, HOUSING, MEDICAL CARE, AND HEATING?: NOT HARD AT ALL

## 2023-07-28 SDOH — ECONOMIC STABILITY: HOUSING INSECURITY
IN THE LAST 12 MONTHS, WAS THERE A TIME WHEN YOU DID NOT HAVE A STEADY PLACE TO SLEEP OR SLEPT IN A SHELTER (INCLUDING NOW)?: NO

## 2023-07-28 SDOH — ECONOMIC STABILITY: FOOD INSECURITY: WITHIN THE PAST 12 MONTHS, THE FOOD YOU BOUGHT JUST DIDN'T LAST AND YOU DIDN'T HAVE MONEY TO GET MORE.: NEVER TRUE

## 2023-07-28 SDOH — ECONOMIC STABILITY: FOOD INSECURITY: WITHIN THE PAST 12 MONTHS, YOU WORRIED THAT YOUR FOOD WOULD RUN OUT BEFORE YOU GOT MONEY TO BUY MORE.: NEVER TRUE

## 2023-07-28 ASSESSMENT — PATIENT HEALTH QUESTIONNAIRE - PHQ9
SUM OF ALL RESPONSES TO PHQ QUESTIONS 1-9: 0
SUM OF ALL RESPONSES TO PHQ9 QUESTIONS 1 & 2: 0
SUM OF ALL RESPONSES TO PHQ QUESTIONS 1-9: 0
2. FEELING DOWN, DEPRESSED OR HOPELESS: 0
SUM OF ALL RESPONSES TO PHQ QUESTIONS 1-9: 0
1. LITTLE INTEREST OR PLEASURE IN DOING THINGS: 0
SUM OF ALL RESPONSES TO PHQ QUESTIONS 1-9: 0

## 2023-07-28 ASSESSMENT — ENCOUNTER SYMPTOMS
SHORTNESS OF BREATH: 0
COUGH: 0
ABDOMINAL PAIN: 0

## 2023-07-28 NOTE — PROGRESS NOTES
7/28/2023    Nhjose Chun 1955 is a 79y.o. year old female established patient,   here for evaluation of the following chief complaint(s):  Chief Complaint   Patient presents with    New Patient    Establish Care           ASSESSMENT/PLAN:  Below is the assessment and plan developed based on review of pertinent history, physical exam, labs, studies, and medications. 1. Essential hypertension  Assessment & Plan:  Controlled with current regimen, plan to continue    Orders:  -     Comprehensive Metabolic Panel; Future  -     Microalbumin / Creatinine Urine Ratio; Future  2. History of breast cancer  Assessment & Plan:   Right side  S/p lumpectomy, 2005, Dr. Patricia Oliver  S/p TRx x 7 weeks  continues annual mammogram  Orders:  -     Jennifer Jain MD, Ob-Gyn, [facility] ([facility])  3. Change in bowel habits  Assessment & Plan:   Discussed diet changes like FODMAP, diet, adding fiber powder, trying probiotic  If not improving should see GI  Orders:  -     [facility] ([address])  4. Gastroesophageal reflux disease without esophagitis  Assessment & Plan:  Continue dietary measures, famotidine  Orders:  -     CHING Heath MD, Gastroenterology, [place] ([address])  5. Fatty liver  Assessment & Plan:   Has had imaging confirmation, mild LFT elevations  Will continue to monitor  6. Mixed hyperlipidemia  Assessment & Plan:  Lab Results   Component Value Date    LDLCALC 102 (H) 11/30/2022        Tolerating statin therapy, plan to continue current regimen pending lab results  Recheck labs to assess for response to medication, whether LDL is at target, and monitor for side effects    Orders:  -     Lipid Panel; Future  7. Generalized anxiety disorder  Assessment & Plan:  Mood symptoms controlled on current regimen and no adverse effects noted, plan to continue   8.  Osteopenia of multiple sites  Assessment & Plan:   Focusing on lifestyle measures, encouraged more

## 2023-07-28 NOTE — ASSESSMENT & PLAN NOTE
Discussed diet changes like FODMAP, diet, adding fiber powder, trying probiotic  If not improving should see GI

## 2023-07-28 NOTE — ASSESSMENT & PLAN NOTE
Lab Results   Component Value Date    LDLCALC 102 (H) 11/30/2022        Tolerating statin therapy, plan to continue current regimen pending lab results  Recheck labs to assess for response to medication, whether LDL is at target, and monitor for side effects

## 2023-07-28 NOTE — ASSESSMENT & PLAN NOTE
Right side  S/p lumpectomy, 2005, Dr. Sophie Robertson  S/p TRx x 7 weeks  continues annual mammogram

## 2023-07-29 DIAGNOSIS — I10 ESSENTIAL (PRIMARY) HYPERTENSION: ICD-10-CM

## 2023-07-31 RX ORDER — AMLODIPINE BESYLATE 2.5 MG/1
TABLET ORAL
Qty: 30 TABLET | Refills: 0 | OUTPATIENT
Start: 2023-07-31

## 2023-08-01 LAB
ALBUMIN SERPL-MCNC: 4.3 G/DL (ref 3.9–4.9)
ALBUMIN/CREAT UR: 18 MG/G CREAT (ref 0–29)
ALBUMIN/GLOB SERPL: 2 {RATIO} (ref 1.2–2.2)
ALP SERPL-CCNC: 131 IU/L (ref 44–121)
ALT SERPL-CCNC: 23 IU/L (ref 0–32)
AST SERPL-CCNC: 20 IU/L (ref 0–40)
BILIRUB SERPL-MCNC: 0.7 MG/DL (ref 0–1.2)
BUN SERPL-MCNC: 14 MG/DL (ref 8–27)
BUN/CREAT SERPL: 19 (ref 12–28)
CALCIUM SERPL-MCNC: 9.8 MG/DL (ref 8.7–10.3)
CHLORIDE SERPL-SCNC: 108 MMOL/L (ref 96–106)
CHOLEST SERPL-MCNC: 135 MG/DL (ref 100–199)
CO2 SERPL-SCNC: 25 MMOL/L (ref 20–29)
CREAT SERPL-MCNC: 0.72 MG/DL (ref 0.57–1)
CREAT UR-MCNC: 400.9 MG/DL
EGFRCR SERPLBLD CKD-EPI 2021: 92 ML/MIN/1.73
GLOBULIN SER CALC-MCNC: 2.1 G/DL (ref 1.5–4.5)
GLUCOSE SERPL-MCNC: 110 MG/DL (ref 70–99)
HDLC SERPL-MCNC: 48 MG/DL
LDLC SERPL CALC-MCNC: 71 MG/DL (ref 0–99)
MICROALBUMIN UR-MCNC: 72.2 UG/ML
POTASSIUM SERPL-SCNC: 4.2 MMOL/L (ref 3.5–5.2)
PROT SERPL-MCNC: 6.4 G/DL (ref 6–8.5)
SODIUM SERPL-SCNC: 146 MMOL/L (ref 134–144)
TRIGL SERPL-MCNC: 79 MG/DL (ref 0–149)
VLDLC SERPL CALC-MCNC: 16 MG/DL (ref 5–40)

## 2023-08-01 NOTE — TELEPHONE ENCOUNTER
Results of Sleep Testing reviewed with patient who agrees to a trial of combination therapy with OAT + APAP therapy. PAP prescription and follow-up discussed with patient. Patient encouraged to call if there were any further questions regarding sleep symptoms. Encounter Diagnosis   Name Primary? THAIS (obstructive sleep apnea) Yes       Orders Placed This Encounter   Procedures    DME Order for (Specify) as OP     - DME device ordered - ResMed Device with Heated Humidifer  / .   - Diagnosis: Obstructive Sleep Apnea (G47.33)  - Length of Need: Lifetime    Pressure Settin - 15 cmH2O     Nasal Cushion (Replace) 2 per month.  Nasal Interface Mask 1 every 3 months.  Headgear 1 every 6 months.  Filter(s) Disposable 2 per month.  Filter(s) Non-Disposable 1 every 6 months. [address]  for Dung Crowley (Replace) 1 every 6 months.  Tubing with heating element 1 every 3 months. Perform Mask Fitting per patient preference and comfort - replace as above. Belle Rose MD, FAA; NPI: [id number]  Electronically signed.  2023

## 2023-08-02 DIAGNOSIS — E78.2 MIXED HYPERLIPIDEMIA: Primary | ICD-10-CM

## 2023-08-02 RX ORDER — ATORVASTATIN CALCIUM 10 MG/1
TABLET, FILM COATED ORAL
Qty: 90 TABLET | Refills: 3 | Status: SHIPPED | OUTPATIENT
Start: 2023-08-02

## 2023-08-14 ENCOUNTER — CLINICAL DOCUMENTATION (OUTPATIENT)
Age: 68
End: 2023-08-14

## 2023-08-17 DIAGNOSIS — I10 ESSENTIAL (PRIMARY) HYPERTENSION: ICD-10-CM

## 2023-08-17 RX ORDER — AMLODIPINE BESYLATE 2.5 MG/1
TABLET ORAL
Qty: 30 TABLET | Refills: 0 | OUTPATIENT
Start: 2023-08-17

## 2023-08-21 DIAGNOSIS — I10 ESSENTIAL (PRIMARY) HYPERTENSION: ICD-10-CM

## 2023-08-22 DIAGNOSIS — I10 ESSENTIAL (PRIMARY) HYPERTENSION: ICD-10-CM

## 2023-08-22 RX ORDER — AMLODIPINE BESYLATE 2.5 MG/1
2.5 TABLET ORAL DAILY
Qty: 90 TABLET | Refills: 1 | Status: SHIPPED | OUTPATIENT
Start: 2023-08-22

## 2023-08-22 RX ORDER — AMLODIPINE BESYLATE 2.5 MG/1
2.5 TABLET ORAL DAILY
Qty: 30 TABLET | Refills: 0 | OUTPATIENT
Start: 2023-08-22

## 2023-08-22 NOTE — TELEPHONE ENCOUNTER
No chief complaint on file.     Last Appointment with Dr. Kelly Meyer 7/28/23    Future Appointments   Date Time Provider [address]   11/29/2023  1:00 PM MD HCANO Ramos BS AMB   1/29/2024  9:00 AM MD MAX Crawford BS AMB

## 2023-08-22 NOTE — TELEPHONE ENCOUNTER
Medication Refill Request    Rachel Edmond Later is requesting a refill of the following medication(s):     amLODIPine (NORVASC) 2.5 MG tablet    Please send refill to:     [id number] [address] [phone] Kenna Spurling [phone] [address] [address]  Phone: [phone] Fax: [phone]

## 2023-09-04 RX ORDER — CITALOPRAM HYDROBROMIDE 10 MG/1
TABLET ORAL
Qty: 90 TABLET | Refills: 0 | OUTPATIENT
Start: 2023-09-04

## 2023-09-20 RX ORDER — CITALOPRAM HYDROBROMIDE 10 MG/1
10 TABLET ORAL DAILY
Qty: 90 TABLET | Refills: 1 | Status: SHIPPED | OUTPATIENT
Start: 2023-09-20

## 2023-09-20 NOTE — TELEPHONE ENCOUNTER
Medication Refill Request    Rachel Solorio is requesting a refill of the following medication(s):   Citalopram 10 mg tablet Qty:90  Please send refill to:   [id number] [address] [phone] Hulan Runner [phone] [address] [facility] [address]  Phone: [phone] Fax: [phone]

## 2023-09-21 RX ORDER — CITALOPRAM HYDROBROMIDE 10 MG/1
10 TABLET ORAL DAILY
Qty: 90 TABLET | Refills: 1 | OUTPATIENT
Start: 2023-09-21

## 2023-11-28 ENCOUNTER — OFFICE VISIT (OUTPATIENT)
Age: 68
End: 2023-11-28
Payer: COMMERCIAL

## 2023-11-28 VITALS
WEIGHT: 177.7 LBS | HEART RATE: 62 BPM | HEIGHT: 66 IN | SYSTOLIC BLOOD PRESSURE: 116 MMHG | DIASTOLIC BLOOD PRESSURE: 67 MMHG | OXYGEN SATURATION: 98 % | BODY MASS INDEX: 28.56 KG/M2

## 2023-11-28 DIAGNOSIS — G47.33 OSA (OBSTRUCTIVE SLEEP APNEA): Primary | ICD-10-CM

## 2023-11-28 DIAGNOSIS — I10 PRIMARY HYPERTENSION: ICD-10-CM

## 2023-11-28 PROCEDURE — 99213 OFFICE O/P EST LOW 20 MIN: CPT | Performed by: INTERNAL MEDICINE

## 2023-11-28 PROCEDURE — 3078F DIAST BP <80 MM HG: CPT | Performed by: INTERNAL MEDICINE

## 2023-11-28 PROCEDURE — 3074F SYST BP LT 130 MM HG: CPT | Performed by: INTERNAL MEDICINE

## 2023-11-28 PROCEDURE — 1123F ACP DISCUSS/DSCN MKR DOCD: CPT | Performed by: INTERNAL MEDICINE

## 2023-11-28 ASSESSMENT — SLEEP AND FATIGUE QUESTIONNAIRES
HOW LIKELY ARE YOU TO NOD OFF OR FALL ASLEEP WHILE LYING DOWN TO REST IN THE AFTERNOON WHEN CIRCUMSTANCES PERMIT: 3
ESS TOTAL SCORE: 10
HOW LIKELY ARE YOU TO NOD OFF OR FALL ASLEEP WHILE SITTING INACTIVE IN A PUBLIC PLACE: 1
HOW LIKELY ARE YOU TO NOD OFF OR FALL ASLEEP WHILE SITTING AND READING: 2
HOW LIKELY ARE YOU TO NOD OFF OR FALL ASLEEP IN A CAR, WHILE STOPPED FOR A FEW MINUTES IN TRAFFIC: 0
HOW LIKELY ARE YOU TO NOD OFF OR FALL ASLEEP WHILE SITTING QUIETLY AFTER LUNCH WITHOUT ALCOHOL: 1
HOW LIKELY ARE YOU TO NOD OFF OR FALL ASLEEP WHILE SITTING AND TALKING TO SOMEONE: 0
HOW LIKELY ARE YOU TO NOD OFF OR FALL ASLEEP WHILE WATCHING TV: 2
HOW LIKELY ARE YOU TO NOD OFF OR FALL ASLEEP WHEN YOU ARE A PASSENGER IN A CAR FOR AN HOUR WITHOUT A BREAK: 1

## 2023-11-28 NOTE — PATIENT INSTRUCTIONS
[address] [facility].[place], [address]  Tel.  [phone]  Fax. [address] [place] [facility], [facility] Gail Carson  Tel.  [phone]  Fax. [phone] [address]  Tel.  [phone]  Fax. [phone]     Learning About CPAP for Sleep Apnea  What is CPAP? CPAP is a small machine that you use at home every night while you sleep. It increases air pressure in your throat to keep your airway open. When you have sleep apnea, this can help you sleep better so you feel much better. CPAP stands for \"continuous positive airway pressure. \"  The CPAP machine will have one of the following:  A mask that covers your nose and mouth  Prongs that fit into your nose  A mask that covers your nose only, the most common type. This type is called NCPAP. The N stands for \"nasal.\"  Why is it done? CPAP is usually the best treatment for obstructive sleep apnea. It is the first treatment choice and the most widely used. Your doctor may suggest CPAP if you have: Moderate to severe sleep apnea. Sleep apnea and coronary artery disease (CAD) or heart failure. How does it help? CPAP can help you have more normal sleep, so you feel less sleepy and more alert during the daytime. CPAP may help keep heart failure or other heart problems from getting worse. NCPAP may help lower your blood pressure. If you use CPAP, your bed partner may also sleep better because you are not snoring or restless. What are the side effects? Some people who use CPAP have:  A dry or stuffy nose and a sore throat. Irritated skin on the face. Sore eyes. Bloating. If you have any of these problems, work with your doctor to fix them. Here are some things you can try:  Be sure the mask or nasal prongs fit well. See if your doctor can adjust the pressure of your CPAP. If your nose is dry, try a humidifier.   If your nose is runny or stuffy, try decongestant medicine or a steroid

## 2023-11-28 NOTE — PROGRESS NOTES
[address] [facility], [address]  Tel.  [phone]    Fax. [address] [facility], [facility] Gail Farah  Tel.  [phone]    Fax. [phone]     [facility], [address] [facility]  Tel.  [phone]    Fax. [phone]       Yash Zhao (: 1955) is a 79 y.o. female, established patient, seen for positive airway pressure follow-up and evaluation of the following chief complaint(s):   Sleep Problem (1st adherence)       Rachel Rosales was last seen by Ms. Raya Huynh on 10-10-22, prior notes reviewed in detail. In lab sleep test 2017 showed AHI of 9.7/hr  with a lowest SaO2 of 88%. She was referred to dentistry for evaluation for OAT. Repeat Home Sleep Apnea Test (HSAT) performed on 23 with Oral Appliance Therapy showed an AHI of 13.2/hr with a lowest SpO2 of 83%, duration of SpO2 < 88% 0.4 minutes. She was switched from Oral Appliance Therapy to APAP Therapy. She is here for her 1st adherence visit. ASSESSMENT/PLAN:   Diagnosis Orders   1. THAIS (obstructive sleep apnea)        2. Primary hypertension        3. BMI 28.0-28.9,adult            On ResMed:  AirSense 10 AutoSet    Settings:  Min EPAP: 06 cmH2O  Max EPAP: 15 cmH2O    EPR: 3    Sleep Apnea -   * She is adherent with PAP therapy and PAP continues to benefit patient and remains necessary for control of her sleep apnea. Continue on current pressures      * She is familiar with the telephone monitoring application, is willing to track therapy and agrees to notify use if AHI is >5 per hour. * Counseling was provided regarding the importance of regular PAP use with emphasis on ensuring sufficient total sleep time, proper sleep hygiene, and safe driving. * Re-enforced proper and regular cleaning for the device. * She was asked to contact our office for any problems regarding PAP therapy.       2. Hypertension -  continue on current regimen - BP Stable, she will continue to

## 2023-11-29 ENCOUNTER — OFFICE VISIT (OUTPATIENT)
Age: 68
End: 2023-11-29
Payer: COMMERCIAL

## 2023-11-29 VITALS
RESPIRATION RATE: 16 BRPM | WEIGHT: 178.8 LBS | BODY MASS INDEX: 28.73 KG/M2 | DIASTOLIC BLOOD PRESSURE: 75 MMHG | HEIGHT: 66 IN | HEART RATE: 68 BPM | SYSTOLIC BLOOD PRESSURE: 135 MMHG | TEMPERATURE: 98.2 F | OXYGEN SATURATION: 98 %

## 2023-11-29 DIAGNOSIS — E78.2 MIXED HYPERLIPIDEMIA: ICD-10-CM

## 2023-11-29 DIAGNOSIS — I10 ESSENTIAL HYPERTENSION: ICD-10-CM

## 2023-11-29 DIAGNOSIS — G47.33 OBSTRUCTIVE SLEEP APNEA SYNDROME: ICD-10-CM

## 2023-11-29 DIAGNOSIS — K21.9 GASTROESOPHAGEAL REFLUX DISEASE WITHOUT ESOPHAGITIS: ICD-10-CM

## 2023-11-29 DIAGNOSIS — Z00.00 ROUTINE PHYSICAL EXAMINATION: Primary | ICD-10-CM

## 2023-11-29 DIAGNOSIS — Z00.00 ROUTINE PHYSICAL EXAMINATION: ICD-10-CM

## 2023-11-29 DIAGNOSIS — M85.89 OSTEOPENIA OF MULTIPLE SITES: ICD-10-CM

## 2023-11-29 PROCEDURE — 3078F DIAST BP <80 MM HG: CPT | Performed by: INTERNAL MEDICINE

## 2023-11-29 PROCEDURE — 3075F SYST BP GE 130 - 139MM HG: CPT | Performed by: INTERNAL MEDICINE

## 2023-11-29 PROCEDURE — 99397 PER PM REEVAL EST PAT 65+ YR: CPT | Performed by: INTERNAL MEDICINE

## 2023-11-29 ASSESSMENT — ENCOUNTER SYMPTOMS
VOMITING: 0
SHORTNESS OF BREATH: 0
NAUSEA: 0
CONSTIPATION: 0
DIARRHEA: 0
ABDOMINAL PAIN: 0
COUGH: 0
EYES NEGATIVE: 1

## 2023-11-29 NOTE — ASSESSMENT & PLAN NOTE
Lab Results   Component Value Date    [address] [address] 07/31/2023     At goal on current statin therapy, plan to continue

## 2023-11-29 NOTE — PROGRESS NOTES
syndrome    Mixed hyperlipidemia    S/P cholecystectomy    Fatty liver    Osteopenia of multiple sites    Change in bowel habits    Gastroesophageal reflux disease without esophagitis    Essential hypertension    Generalized anxiety disorder        Patient has no known allergies. Social History     Occupational History    Not on file   Tobacco Use    Smoking status: Former     Packs/day: 0.50     Years: 10.00     Additional pack years: 0.00     Total pack years: 5.00     Types: Cigarettes     Quit date: 1993     Years since quittin.9     Passive exposure: Past    Smokeless tobacco: Never   Vaping Use    Vaping Use: Never used   Substance and Sexual Activity    Alcohol use: Yes    Drug use: No    Sexual activity: Not on file        Current Outpatient Medications   Medication Instructions    amLODIPine (NORVASC) 2.5 mg, Oral, DAILY    atorvastatin (LIPITOR) 10 MG tablet TAKE 1 TABLET BY MOUTH EVERY DAY IN THE EVENING    Calcium Carbonate-Vitamin D 250-3. 125 MG-MCG TABS Oral, DAILY    citalopram (CELEXA) 10 mg, Oral, DAILY    Collagen-Vitamin C-Biotin (COLLAGEN 1500/C PO) Oral    famotidine (PEPCID) 40 mg, Oral    vitamin E 1,000 Units, Oral, DAILY           Review of Systems   Constitutional:  Negative for chills and fever. HENT:  Negative for congestion. Eyes: Negative. Respiratory:  Negative for cough and shortness of breath. Cardiovascular:  Negative for chest pain, palpitations and leg swelling. Gastrointestinal:  Negative for abdominal pain, constipation, diarrhea, nausea and vomiting. Genitourinary:  Negative for dysuria. Musculoskeletal:  Negative for arthralgias and myalgias. Skin:  Negative for rash. Neurological:  Negative for dizziness and headaches. Hematological:  Does not bruise/bleed easily. Psychiatric/Behavioral:  Negative for dysphoric mood and sleep disturbance. The patient is not nervous/anxious.           Objective:     Physical Exam  Constitutional:

## 2023-12-05 PROBLEM — R73.03 PREDIABETES: Status: ACTIVE | Noted: 2023-12-05

## 2023-12-05 LAB
ALBUMIN SERPL-MCNC: 4.2 G/DL (ref 3.9–4.9)
ALBUMIN/GLOB SERPL: 2.1 {RATIO} (ref 1.2–2.2)
ALP SERPL-CCNC: 121 IU/L (ref 44–121)
ALT SERPL-CCNC: 17 IU/L (ref 0–32)
AST SERPL-CCNC: 18 IU/L (ref 0–40)
BASOPHILS # BLD AUTO: 0 X10E3/UL (ref 0–0.2)
BASOPHILS NFR BLD AUTO: 1 %
BILIRUB SERPL-MCNC: 0.6 MG/DL (ref 0–1.2)
BUN SERPL-MCNC: 12 MG/DL (ref 8–27)
BUN/CREAT SERPL: 19 (ref 12–28)
CALCIUM SERPL-MCNC: 9.2 MG/DL (ref 8.7–10.3)
CHLORIDE SERPL-SCNC: 106 MMOL/L (ref 96–106)
CO2 SERPL-SCNC: 22 MMOL/L (ref 20–29)
CREAT SERPL-MCNC: 0.64 MG/DL (ref 0.57–1)
EGFRCR SERPLBLD CKD-EPI 2021: 97 ML/MIN/1.73
EOSINOPHIL # BLD AUTO: 0.1 X10E3/UL (ref 0–0.4)
EOSINOPHIL NFR BLD AUTO: 2 %
ERYTHROCYTE [DISTWIDTH] IN BLOOD BY AUTOMATED COUNT: 12 % (ref 11.7–15.4)
GLOBULIN SER CALC-MCNC: 2 G/DL (ref 1.5–4.5)
GLUCOSE SERPL-MCNC: 105 MG/DL (ref 70–99)
HBA1C MFR BLD: 5.8 % (ref 4.8–5.6)
HCT VFR BLD AUTO: 42.5 % (ref 34–46.6)
HGB BLD-MCNC: 14.1 G/DL (ref 11.1–15.9)
IMM GRANULOCYTES # BLD AUTO: 0 X10E3/UL (ref 0–0.1)
IMM GRANULOCYTES NFR BLD AUTO: 0 %
LDLC SERPL DIRECT ASSAY-MCNC: 77 MG/DL (ref 0–99)
LYMPHOCYTES # BLD AUTO: 1.3 X10E3/UL (ref 0.7–3.1)
LYMPHOCYTES NFR BLD AUTO: 29 %
MCH RBC QN AUTO: 31.1 PG (ref 26.6–33)
MCHC RBC AUTO-ENTMCNC: 33.2 G/DL (ref 31.5–35.7)
MCV RBC AUTO: 94 FL (ref 79–97)
MONOCYTES # BLD AUTO: 0.4 X10E3/UL (ref 0.1–0.9)
MONOCYTES NFR BLD AUTO: 8 %
NEUTROPHILS # BLD AUTO: 2.7 X10E3/UL (ref 1.4–7)
NEUTROPHILS NFR BLD AUTO: 60 %
PLATELET # BLD AUTO: 235 X10E3/UL (ref 150–450)
POTASSIUM SERPL-SCNC: 4 MMOL/L (ref 3.5–5.2)
PROT SERPL-MCNC: 6.2 G/DL (ref 6–8.5)
RBC # BLD AUTO: 4.53 X10E6/UL (ref 3.77–5.28)
SODIUM SERPL-SCNC: 143 MMOL/L (ref 134–144)
TSH SERPL DL<=0.005 MIU/L-ACNC: 4.46 UIU/ML (ref 0.45–4.5)
WBC # BLD AUTO: 4.5 X10E3/UL (ref 3.4–10.8)

## 2024-01-11 ENCOUNTER — HOSPITAL ENCOUNTER (OUTPATIENT)
Facility: HOSPITAL | Age: 69
Discharge: HOME | End: 2024-01-11
Payer: COMMERCIAL

## 2024-01-11 DIAGNOSIS — M85.89 OSTEOPENIA OF MULTIPLE SITES: ICD-10-CM

## 2024-01-11 PROCEDURE — 77080 DXA BONE DENSITY AXIAL: CPT

## 2024-01-29 ENCOUNTER — OFFICE VISIT (OUTPATIENT)
Age: 69
End: 2024-01-29
Payer: COMMERCIAL

## 2024-01-29 VITALS
SYSTOLIC BLOOD PRESSURE: 126 MMHG | WEIGHT: 178 LBS | DIASTOLIC BLOOD PRESSURE: 72 MMHG | HEIGHT: 67 IN | BODY MASS INDEX: 27.94 KG/M2

## 2024-01-29 DIAGNOSIS — Z01.419 ENCOUNTER FOR WELL WOMAN EXAM WITH ROUTINE GYNECOLOGICAL EXAM: Primary | ICD-10-CM

## 2024-01-29 PROCEDURE — 3078F DIAST BP <80 MM HG: CPT | Performed by: OBSTETRICS & GYNECOLOGY

## 2024-01-29 PROCEDURE — 99387 INIT PM E/M NEW PAT 65+ YRS: CPT | Performed by: OBSTETRICS & GYNECOLOGY

## 2024-01-29 PROCEDURE — 3074F SYST BP LT 130 MM HG: CPT | Performed by: OBSTETRICS & GYNECOLOGY

## 2024-01-29 NOTE — PROGRESS NOTES
Annual exam    Rachel Warner is a 68 y.o. postmenopausal female presenting for annual exam. Her main concerns today include wellness visit and pap.    Her previous gynecologist was Dr. Null.      No PMB.      Her daughters live here in [place], no grandchildren.   She is retired from the  office.     She declines a chaperone during the gynecologic exam today.    Ob/Gyn Hx:   -  x2  Menopause age - 47  PMB - none  Menopausal symptoms - none  HRT - none  Sexually active - not currently     Health maintenance:  Pap-  normal per patient   Mammo- 2024, Bi-rads: 1 negative  Colonoscopy- 3 years ago, normal per patient   Dexa- 2024, IMPRESSION:     This patient is osteopenic using the World Health Organization criteria  As compared to the prior study, there has been no significant change.  10 year probability of major osteoporotic fracture: 11.1  .  10 year probability of hip fracture: 1.4  .    Past Medical History:   Diagnosis Date    Cancer (HCC)     breast (right)    Ill-defined condition     migraines    Lipoma 2013       Past Surgical History:   Procedure Laterality Date    BREAST LUMPECTOMY      s/p RTx (x7 weeks)    CHOLECYSTECTOMY      COLONOSCOPY   & 2/27/15    polyps 1st time, 2nd time normal - repeat 5 years - Dr. Diaz    TUBAL LIGATION      UROLOGICAL SURGERY  2013    sling       Family History   Problem Relation Age of Onset    Heart Disease Father     Heart Attack Father     Heart Disease Maternal Grandfather     Cancer Paternal Grandmother     Bleeding Prob Paternal Grandmother         stomach    Lung Disease Paternal Grandfather     Hypertension Brother     Anesth Problems Neg Hx     Hypertension Mother     Cancer Mother         lung, liver, metastatic    Diabetes Sister     Hypertension Sister     Hypertension Brother     Coronary Art Dis Father         since age 40's    Cancer Maternal Grandmother         UNKNOWN       Social History

## 2024-01-31 LAB
., LABCORP: NORMAL
CYTOLOGIST CVX/VAG CYTO: NORMAL
CYTOLOGY CVX/VAG DOC CYTO: NORMAL
CYTOLOGY CVX/VAG DOC THIN PREP: NORMAL
DX ICD CODE: NORMAL
Lab: NORMAL
Lab: NORMAL
OTHER STN SPEC: NORMAL
STAT OF ADQ CVX/VAG CYTO-IMP: NORMAL

## 2024-02-18 DIAGNOSIS — E78.2 MIXED HYPERLIPIDEMIA: ICD-10-CM

## 2024-02-18 DIAGNOSIS — I10 ESSENTIAL (PRIMARY) HYPERTENSION: ICD-10-CM

## 2024-02-19 DIAGNOSIS — I10 ESSENTIAL (PRIMARY) HYPERTENSION: ICD-10-CM

## 2024-02-19 RX ORDER — AMLODIPINE BESYLATE 2.5 MG/1
2.5 TABLET ORAL DAILY
Qty: 90 TABLET | Refills: 1 | OUTPATIENT
Start: 2024-02-19

## 2024-02-19 RX ORDER — ATORVASTATIN CALCIUM 10 MG/1
10 TABLET, FILM COATED ORAL EVERY EVENING
Qty: 90 TABLET | Refills: 3 | OUTPATIENT
Start: 2024-02-19

## 2024-02-19 RX ORDER — AMLODIPINE BESYLATE 2.5 MG/1
2.5 TABLET ORAL DAILY
Qty: 90 TABLET | Refills: 1 | Status: SHIPPED | OUTPATIENT
Start: 2024-02-19

## 2024-03-04 RX ORDER — CITALOPRAM HYDROBROMIDE 10 MG/1
10 TABLET ORAL DAILY
Qty: 90 TABLET | Refills: 1 | Status: SHIPPED | OUTPATIENT
Start: 2024-03-04

## 2024-06-03 ENCOUNTER — OFFICE VISIT (OUTPATIENT)
Age: 69
End: 2024-06-03
Payer: COMMERCIAL

## 2024-06-03 VITALS
HEIGHT: 66 IN | TEMPERATURE: 97.8 F | WEIGHT: 182.4 LBS | RESPIRATION RATE: 16 BRPM | OXYGEN SATURATION: 99 % | HEART RATE: 64 BPM | BODY MASS INDEX: 29.32 KG/M2 | SYSTOLIC BLOOD PRESSURE: 137 MMHG | DIASTOLIC BLOOD PRESSURE: 80 MMHG

## 2024-06-03 DIAGNOSIS — I10 ESSENTIAL HYPERTENSION: Primary | ICD-10-CM

## 2024-06-03 DIAGNOSIS — R19.4 CHANGE IN BOWEL HABITS: ICD-10-CM

## 2024-06-03 DIAGNOSIS — R73.03 PREDIABETES: ICD-10-CM

## 2024-06-03 DIAGNOSIS — E78.2 MIXED HYPERLIPIDEMIA: ICD-10-CM

## 2024-06-03 DIAGNOSIS — F41.1 GENERALIZED ANXIETY DISORDER: ICD-10-CM

## 2024-06-03 PROCEDURE — 3075F SYST BP GE 130 - 139MM HG: CPT | Performed by: INTERNAL MEDICINE

## 2024-06-03 PROCEDURE — 99214 OFFICE O/P EST MOD 30 MIN: CPT | Performed by: INTERNAL MEDICINE

## 2024-06-03 PROCEDURE — 3079F DIAST BP 80-89 MM HG: CPT | Performed by: INTERNAL MEDICINE

## 2024-06-03 PROCEDURE — 1123F ACP DISCUSS/DSCN MKR DOCD: CPT | Performed by: INTERNAL MEDICINE

## 2024-06-03 RX ORDER — MULTIVIT-MIN/IRON/FOLIC ACID/K 18-600-40
CAPSULE ORAL
COMMUNITY

## 2024-06-03 ASSESSMENT — ENCOUNTER SYMPTOMS
DIARRHEA: 1
COUGH: 0
ABDOMINAL PAIN: 0

## 2024-06-03 ASSESSMENT — PATIENT HEALTH QUESTIONNAIRE - PHQ9
SUM OF ALL RESPONSES TO PHQ QUESTIONS 1-9: 0
1. LITTLE INTEREST OR PLEASURE IN DOING THINGS: NOT AT ALL
SUM OF ALL RESPONSES TO PHQ QUESTIONS 1-9: 0
SUM OF ALL RESPONSES TO PHQ9 QUESTIONS 1 & 2: 0
SUM OF ALL RESPONSES TO PHQ QUESTIONS 1-9: 0
2. FEELING DOWN, DEPRESSED OR HOPELESS: NOT AT ALL
SUM OF ALL RESPONSES TO PHQ QUESTIONS 1-9: 0

## 2024-06-03 NOTE — ASSESSMENT & PLAN NOTE
Hemoglobin A1C   Date Value Ref Range Status   12/04/2023 5.8 (H) 4.8 - 5.6 % Final     Comment:              Prediabetes: 5.7 - 6.4           Diabetes: >6.4           Glycemic control for adults with diabetes: <7.0       Discussed diet recs, will continue q6mo monitoring

## 2024-06-03 NOTE — ASSESSMENT & PLAN NOTE
Lab Results   Component Value Date    LDLDIRECT 77 12/04/2023        Tolerating statin therapy, plan to continue current regimen pending lab results  Recheck labs to assess for response to medication, whether LDL is at target, and monitor for side effects

## 2024-06-03 NOTE — PROGRESS NOTES
Chief Complaint   Patient presents with    6 Month Follow-Up    Hypertension     Blood pressure 137/80, pulse 64, temperature 97.8 °F (36.6 °C), temperature source Oral, resp. rate 16, height 1.676 m (5' 6\"), weight 82.7 kg (182 lb 6.4 oz), SpO2 99 %.

## 2024-06-03 NOTE — PROGRESS NOTES
6/3/2024    Rachel Warner 1955 is a 69 y.o. year old female established patient,   here for evaluation of the following chief complaint(s):  Chief Complaint   Patient presents with    6 Month Follow-Up    Hypertension           ASSESSMENT/PLAN:  Below is the assessment and plan developed based on review of pertinent history, physical exam, labs, studies, and medications.    1. Essential hypertension  Assessment & Plan:   Controlled with current regimen, plan to continue  Orders:  -     Basic Metabolic Panel; Future  -     Microalbumin / Creatinine Urine Ratio; Future  2. Mixed hyperlipidemia  Assessment & Plan:  Lab Results   Component Value Date    LDLDIRECT 77 12/04/2023        Tolerating statin therapy, plan to continue current regimen pending lab results  Recheck labs to assess for response to medication, whether LDL is at target, and monitor for side effects    Orders:  -     Lipid Panel; Future  3. Prediabetes  Assessment & Plan:  Hemoglobin A1C   Date Value Ref Range Status   12/04/2023 5.8 (H) 4.8 - 5.6 % Final     Comment:              Prediabetes: 5.7 - 6.4           Diabetes: >6.4           Glycemic control for adults with diabetes: <7.0       Discussed diet recs, will continue q6mo monitoring  Orders:  -     Hemoglobin A1C; Future  4. Change in bowel habits  Assessment & Plan:  Rec trial of psyllium fiber for looser stools  If not improving should see GI  5. Generalized anxiety disorder  Assessment & Plan:  Mood symptoms controlled on current regimen and no adverse effects noted, plan to continue        Return in about 6 months (around 12/3/2024) for annual physical,or sooner as needed.       SUBJECTIVE/OBJECTIVE:  Patient here for review of chronic conditions, with statuses as documented in Assessment and Plan      Review of Systems   Constitutional:  Negative for chills and fever.   Respiratory:  Negative for cough.    Cardiovascular:  Negative for leg swelling.   Gastrointestinal:  Positive for

## 2024-06-04 DIAGNOSIS — I10 ESSENTIAL HYPERTENSION: ICD-10-CM

## 2024-06-04 DIAGNOSIS — E78.2 MIXED HYPERLIPIDEMIA: ICD-10-CM

## 2024-06-04 DIAGNOSIS — R73.03 PREDIABETES: ICD-10-CM

## 2024-06-21 LAB
ALBUMIN/CREAT UR: 8 MG/G CREAT (ref 0–29)
BUN SERPL-MCNC: 14 MG/DL (ref 8–27)
BUN/CREAT SERPL: 20 (ref 12–28)
CALCIUM SERPL-MCNC: 9.5 MG/DL (ref 8.7–10.3)
CHLORIDE SERPL-SCNC: 107 MMOL/L (ref 96–106)
CHOLEST SERPL-MCNC: 135 MG/DL (ref 100–199)
CO2 SERPL-SCNC: 25 MMOL/L (ref 20–29)
CREAT SERPL-MCNC: 0.7 MG/DL (ref 0.57–1)
CREAT UR-MCNC: 165.5 MG/DL
EGFRCR SERPLBLD CKD-EPI 2021: 95 ML/MIN/1.73
GLUCOSE SERPL-MCNC: 103 MG/DL (ref 70–99)
HBA1C MFR BLD: 5.8 % (ref 4.8–5.6)
HDLC SERPL-MCNC: 47 MG/DL
LDLC SERPL CALC-MCNC: 73 MG/DL (ref 0–99)
MICROALBUMIN UR-MCNC: 13.8 UG/ML
POTASSIUM SERPL-SCNC: 3.8 MMOL/L (ref 3.5–5.2)
SODIUM SERPL-SCNC: 143 MMOL/L (ref 134–144)
TRIGL SERPL-MCNC: 76 MG/DL (ref 0–149)
VLDLC SERPL CALC-MCNC: 15 MG/DL (ref 5–40)

## 2024-07-12 ENCOUNTER — HOSPITAL ENCOUNTER (OUTPATIENT)
Age: 69
Discharge: HOME | End: 2024-07-12
Payer: COMMERCIAL

## 2024-07-12 ENCOUNTER — OFFICE VISIT (OUTPATIENT)
Age: 69
End: 2024-07-12
Payer: COMMERCIAL

## 2024-07-12 VITALS
HEIGHT: 66 IN | DIASTOLIC BLOOD PRESSURE: 77 MMHG | BODY MASS INDEX: 29.67 KG/M2 | RESPIRATION RATE: 16 BRPM | SYSTOLIC BLOOD PRESSURE: 159 MMHG | TEMPERATURE: 97.5 F | HEART RATE: 55 BPM | OXYGEN SATURATION: 100 % | WEIGHT: 184.6 LBS

## 2024-07-12 DIAGNOSIS — M25.511 ACUTE PAIN OF RIGHT SHOULDER: ICD-10-CM

## 2024-07-12 DIAGNOSIS — M25.511 ACUTE PAIN OF RIGHT SHOULDER: Primary | ICD-10-CM

## 2024-07-12 PROCEDURE — 73030 X-RAY EXAM OF SHOULDER: CPT

## 2024-07-12 PROCEDURE — 1123F ACP DISCUSS/DSCN MKR DOCD: CPT | Performed by: NURSE PRACTITIONER

## 2024-07-12 PROCEDURE — 3078F DIAST BP <80 MM HG: CPT | Performed by: NURSE PRACTITIONER

## 2024-07-12 PROCEDURE — 99213 OFFICE O/P EST LOW 20 MIN: CPT | Performed by: NURSE PRACTITIONER

## 2024-07-12 PROCEDURE — 3077F SYST BP >= 140 MM HG: CPT | Performed by: NURSE PRACTITIONER

## 2024-07-12 RX ORDER — METHYLPREDNISOLONE 4 MG/1
TABLET ORAL
Qty: 21 TABLET | Refills: 0 | Status: SHIPPED | OUTPATIENT
Start: 2024-07-12 | End: 2024-07-17

## 2024-07-12 ASSESSMENT — PATIENT HEALTH QUESTIONNAIRE - PHQ9
SUM OF ALL RESPONSES TO PHQ QUESTIONS 1-9: 0
1. LITTLE INTEREST OR PLEASURE IN DOING THINGS: NOT AT ALL
SUM OF ALL RESPONSES TO PHQ QUESTIONS 1-9: 0
SUM OF ALL RESPONSES TO PHQ QUESTIONS 1-9: 0
2. FEELING DOWN, DEPRESSED OR HOPELESS: NOT AT ALL
SUM OF ALL RESPONSES TO PHQ9 QUESTIONS 1 & 2: 0
SUM OF ALL RESPONSES TO PHQ QUESTIONS 1-9: 0

## 2024-07-12 NOTE — PROGRESS NOTES
Rachel Warner (:  1955) is a 69 y.o. female,here for evaluation of the following chief complaint(s):  Shoulder Pain    BP (!) 159/77   Pulse 55   Temp 97.5 °F (36.4 °C) (Temporal)   Resp 16   Ht 1.676 m (5' 6\")   Wt 83.7 kg (184 lb 9.6 oz)   SpO2 100%   BMI 29.80 kg/m²       SUBJECTIVE/OBJECTIVE:    HPI:Patient reports right shoulder pain x 3 months ago, but worse the past week. She has decreased strength and decreased range of motion. No trauma or injury. It is painful to sleep on that side.  She has not taken anything for pain.     Review of Systems   Musculoskeletal:         Right shoulder pain       Physical Exam  Constitutional:       Appearance: Normal appearance.   Musculoskeletal:      Right shoulder: Tenderness (below ac) present. No swelling. Decreased range of motion.   Skin:     General: Skin is warm and dry.   Neurological:      General: No focal deficit present.      Mental Status: She is alert and oriented to person, place, and time.   Psychiatric:         Mood and Affect: Mood normal.         Behavior: Behavior normal.          ASSESSMENT/PLAN:  1. Acute pain of right shoulder  -     XR SHOULDER RIGHT (MIN 2 VIEWS); Future  -     [facility] - Physical Therapy at [facility]  -     methylPREDNISolone (MEDROL DOSEPACK) 4 MG tablet; Take as directed on package instructions, Disp-21 tablet, R-0Normal  -     [facility] - Zoya Briggs MD, Orthopedic Surgery (sports medicine), [place] ([facility])  Xray and PT ordered  Medrol  Refer to ortho if no improvement over the next 2 weeks    --CARROLL Alvarez - NP

## 2024-07-15 ENCOUNTER — HOSPITAL ENCOUNTER (OUTPATIENT)
Facility: HOSPITAL | Age: 69
Setting detail: RECURRING SERIES
Discharge: HOME | End: 2024-07-18
Payer: COMMERCIAL

## 2024-07-15 PROCEDURE — 97110 THERAPEUTIC EXERCISES: CPT | Performed by: PHYSICAL THERAPIST

## 2024-07-15 PROCEDURE — 97162 PT EVAL MOD COMPLEX 30 MIN: CPT | Performed by: PHYSICAL THERAPIST

## 2024-07-15 NOTE — THERAPY EVALUATION
Physical Therapy at [facility],   a part of   [address] [address]  Phone: [phone]  Fax: [phone]         PHYSICAL THERAPY - EVALUATION/PLAN OF CARE NOTE (updated 3/23)      Date: 7/15/2024          Patient Name:  Rachel Warner :  1955   Medical   Diagnosis:  Acute pain of right shoulder [M25.511] Treatment Diagnosis:  M25.511  RIGHT SHOULDER PAIN    Referral Source:  Reza Austin AP* Provider #:  [id number]                Insurance: Payor: [employer] / Plan: [employer] PPO / Product Type: *No Product type* /      Patient  verified yes     Visit #   Current  / Total 1 24   Time   In / Out 212  PM   Total Treatment Time 43   Total Timed Codes 10         SUBJECTIVE  Pain Level (0-10 scale): 2/10 at rest, 7/10 at worst  []constant []intermittent []improving []worsening []no change since onset    Any medication changes, allergies to medications, adverse drug reactions, diagnosis change, or new procedure performed?: [x] No    [] Yes (see summary sheet for update)  Medications: Verified on Patient Summary List    Subjective functional status/changes:     Pt reports R shoulder pain with insidious onset for about 1 week. She did start Medrol dosepak yesterday and notes she has a little more movement. Notes that all movements are painful. Pt notes some ache in lateral arm at rest. She has been unable to sleep on R side.     Start of Care: 7/15/2024  Onset Date: 1 wk ago  Current symptoms/Complaints: R shoulder pain, lateral arm pain, difficulty lifting and moving R arm  Mechanism of Injury: insidious  PLOF: household chores, gardening  Limitations to PLOF/Activity or Recreational Limitations: lifting, reaching, pushing, pulling, dressing  Work Hx: retired  Living Situation: lives with   Mobility: ind  Self Care: ind  Previous Treatment/Compliance: medrol dose pack  PMHx/Surgical Hx/Comorbidites: gall bladder removed, breast cancer -

## 2024-07-25 ENCOUNTER — HOSPITAL ENCOUNTER (OUTPATIENT)
Facility: HOSPITAL | Age: 69
Setting detail: RECURRING SERIES
Discharge: HOME | End: 2024-07-28
Payer: COMMERCIAL

## 2024-07-25 PROCEDURE — 97110 THERAPEUTIC EXERCISES: CPT

## 2024-07-25 PROCEDURE — 97140 MANUAL THERAPY 1/> REGIONS: CPT

## 2024-07-25 NOTE — PROGRESS NOTES
PHYSICAL THERAPY - DAILY TREATMENT NOTE (updated 3/23)      Date: 2024          Patient Name:  Rachel Warner :  1955   Medical   Diagnosis:  Acute pain of right shoulder [M25.511] Treatment Diagnosis:  M25.511  RIGHT SHOULDER PAIN    Referral Source:  Reza Austin AP* Insurance:   Payor: [employer] / Plan: [employer] PPO / Product Type: *No Product type* /                     Patient  verified yes     Visit #   Current  / Total 2 24   Time   In / Out 10:10 A 10:50 A   Total Treatment Time 40   Total Timed Codes 30         SUBJECTIVE    Pain Level (0-10 scale): 0 rest    Any medication changes, allergies to medications, adverse drug reactions, diagnosis change, or new procedure performed?: [x] No    [] Yes (see summary sheet for update)  Medications: Verified on Patient Summary List    Subjective functional status/changes:     Pt reports she has some sensation in her lateral delt, but not pain. Had pain two days after initial eval.     OBJECTIVE      Therapeutic Procedures:  Tx Min Billable or 1:1 Min (if diff from Tx Min) Procedure, Rationale, Specifics   20  51907 Therapeutic Exercise (timed):  increase ROM, strength, coordination, balance, and proprioception to improve patient's ability to progress to PLOF and address remaining functional goals. (see flow sheet as applicable)     Details if applicable:     10  [id number] Manual Therapy (timed):  decrease pain, increase ROM, and increase tissue extensibility to improve patient's ability to progress to PLOF and address remaining functional goals.  The manual therapy interventions were performed at a separate and distinct time from the therapeutic activities interventions . (see flow sheet as applicable)     Details if applicable:  Inferior/Posterior GH jt mobs Gr II, PROM all planes, STM R UT         Details if applicable:           Details if applicable:            Details if applicable:     30     Total Total         Modalities Rationale:     decrease

## 2024-07-29 ENCOUNTER — HOSPITAL ENCOUNTER (OUTPATIENT)
Facility: HOSPITAL | Age: 69
Setting detail: RECURRING SERIES
Discharge: HOME | End: 2024-08-01
Payer: COMMERCIAL

## 2024-07-29 PROCEDURE — 97140 MANUAL THERAPY 1/> REGIONS: CPT

## 2024-07-29 PROCEDURE — 97110 THERAPEUTIC EXERCISES: CPT

## 2024-07-29 NOTE — PROGRESS NOTES
PHYSICAL THERAPY - DAILY TREATMENT NOTE (updated 3/23)      Date: 2024          Patient Name:  Rachel Warner :  1955   Medical   Diagnosis:  Acute pain of right shoulder [M25.511] Treatment Diagnosis:  M25.511  RIGHT SHOULDER PAIN    Referral Source:  Reza Austin AP* Insurance:   Payor: [employer] / Plan: [employer] PPO / Product Type: *No Product type* /                     Patient  verified yes     Visit #   Current  / Total 3 24   Time   In / Out 9:50 A 10:32 A   Total Treatment Time 42   Total Timed Codes 37         SUBJECTIVE    Pain Level (0-10 scale): 0 rest, 3/10 with movement    Any medication changes, allergies to medications, adverse drug reactions, diagnosis change, or new procedure performed?: [x] No    [] Yes (see summary sheet for update)  Medications: Verified on Patient Summary List    Subjective functional status/changes:     Pt reports she is doing ok and feels like she has improved motion in her shoulder. States when she cooks and cleans she will noticed increased lateral arm pain. She is not taking any medication or using ice/heat at home.    OBJECTIVE      Therapeutic Procedures:  Tx Min Billable or 1:1 Min (if diff from Tx Min) Procedure, Rationale, Specifics   27  94623 Therapeutic Exercise (timed):  increase ROM, strength, coordination, balance, and proprioception to improve patient's ability to progress to PLOF and address remaining functional goals. (see flow sheet as applicable)     Details if applicable:  reviewed HEP, added per flowsheet   10  78322 Manual Therapy (timed):  decrease pain, increase ROM, and increase tissue extensibility to improve patient's ability to progress to PLOF and address remaining functional goals.  The manual therapy interventions were performed at a separate and distinct time from the therapeutic activities interventions . (see flow sheet as applicable)     Details if applicable:  Inferior/Posterior GH jt mobs Gr II, PROM all planes, STM R UT,

## 2024-07-31 ENCOUNTER — HOSPITAL ENCOUNTER (OUTPATIENT)
Facility: HOSPITAL | Age: 69
Setting detail: RECURRING SERIES
Discharge: HOME | End: 2024-08-03
Payer: COMMERCIAL

## 2024-07-31 PROCEDURE — 97140 MANUAL THERAPY 1/> REGIONS: CPT

## 2024-07-31 PROCEDURE — 97110 THERAPEUTIC EXERCISES: CPT

## 2024-07-31 NOTE — PROGRESS NOTES
improve patient's ability to progress to PLOF and address remaining functional goals.       min [] Estim Unattended,             type/location:       []  w/ice    []  w/heat        min [] Estim Attended,             type/location:       []  w/ice   []  w/heat         []  w/US   []  TENS insruct            min []  Mechanical Traction,        type/lbs:        []  pro      []  sup           []  int       []  cont            []  before manual           []  after manual     min []  Ultrasound,         settings/location:     10 min  unbilled [x]  Ice     []  Heat            location/position: R shoulder / seated         min []  Vasopneumatic Device,      press/temp:   pre-treatment girth :    post-treatment girth :    measured at (landmark       location) :   If using vaso (only need to measure limb vaso being performed on)        min []  Other:        Skin assessment post-treatment (if applicable):    [x]  intact    []  redness- no adverse reaction                 []redness - adverse reaction:          [x]  Patient Education billed concurrently with other procedures   [x] Review HEP    [] Progressed/Changed HEP, detail:    [] Other detail:         Other Objective/Functional Measures  NT    Pain Level at end of session (0-10 scale): 0      Assessment   PROM improving however continues to have increased TTP along R subscapularis, UT and infraspinatus mm but improves with manual therapy.   Patient will continue to benefit from skilled PT / OT services to modify and progress therapeutic interventions, analyze and address functional mobility deficits, analyze and address ROM deficits, analyze and address strength deficits, and analyze and address soft tissue restrictions to address functional deficits and attain remaining goals.    Progress toward goals / Updated goals:  []  See Progress Note/Recertification    NT      PLAN  Yes  Continue plan of care  Re-Cert Due: -  [x]  Upgrade activities as tolerated  []  Discharge due

## 2024-08-05 ENCOUNTER — HOSPITAL ENCOUNTER (OUTPATIENT)
Facility: HOSPITAL | Age: 69
Setting detail: RECURRING SERIES
Discharge: HOME | End: 2024-08-08
Payer: COMMERCIAL

## 2024-08-05 PROCEDURE — 97140 MANUAL THERAPY 1/> REGIONS: CPT

## 2024-08-05 PROCEDURE — 97110 THERAPEUTIC EXERCISES: CPT

## 2024-08-05 NOTE — PROGRESS NOTES
PHYSICAL THERAPY - DAILY TREATMENT NOTE (updated 3/23)      Date: 2024          Patient Name:  Rachel Warner :  1955   Medical   Diagnosis:  Acute pain of right shoulder [M25.511] Treatment Diagnosis:  M25.511  RIGHT SHOULDER PAIN    Referral Source:  Reza Austin AP* Insurance:   Payor: CoverMe / Plan: CoverMe PPO / Product Type: *No Product type* /                     Patient  verified yes     Visit #   Current  / Total 5 24   Time   In / Out 9:35 A 10:35  A   Total Treatment Time 60   Total Timed Codes 50         SUBJECTIVE    Pain Level (0-10 scale): 0 rest, 3/10 with movement    Any medication changes, allergies to medications, adverse drug reactions, diagnosis change, or new procedure performed?: [x] No    [] Yes (see summary sheet for update)  Medications: Verified on Patient Summary List    Subjective functional status/changes:     Pt reports she is able to move her arm with less pain.    OBJECTIVE      Therapeutic Procedures:  Tx Min Billable or 1:1 Min (if diff from Tx Min) Procedure, Rationale, Specifics   40  06526 Therapeutic Exercise (timed):  increase ROM, strength, coordination, balance, and proprioception to improve patient's ability to progress to PLOF and address remaining functional goals. (see flow sheet as applicable)     Details if applicable:  reviewed HEP, added per flowsheet   10  68618 Manual Therapy (timed):  decrease pain, increase ROM, and increase tissue extensibility to improve patient's ability to progress to PLOF and address remaining functional goals.  The manual therapy interventions were performed at a separate and distinct time from the therapeutic activities interventions . (see flow sheet as applicable)     Details if applicable:  Inferior/Posterior GH jt mobs Gr II, PROM all planes, STM R UT, infraspinatus and subscapularis, teres major/minor mm   50     Total Total         Modalities Rationale:     decrease inflammation and decrease pain to improve

## 2024-08-07 ENCOUNTER — HOSPITAL ENCOUNTER (OUTPATIENT)
Facility: HOSPITAL | Age: 69
Setting detail: RECURRING SERIES
Discharge: HOME | End: 2024-08-10
Payer: COMMERCIAL

## 2024-08-07 PROCEDURE — 97110 THERAPEUTIC EXERCISES: CPT | Performed by: PHYSICAL THERAPIST

## 2024-08-07 PROCEDURE — 97140 MANUAL THERAPY 1/> REGIONS: CPT | Performed by: PHYSICAL THERAPIST

## 2024-08-07 NOTE — PROGRESS NOTES
PHYSICAL THERAPY - DAILY TREATMENT NOTE (updated 3/23)      Date: 2024          Patient Name:  Rachel Warner :  1955   Medical   Diagnosis:  Acute pain of right shoulder [M25.511] Treatment Diagnosis:  M25.511  RIGHT SHOULDER PAIN    Referral Source:  Reza Austin AP* Insurance:   Payor: [employer] / Plan: [employer] PPO / Product Type: *No Product type* /                     Patient  verified yes     Visit #   Current  / Total 6 24   Time   In / Out 1030 A 1120A   Total Treatment Time 50   Total Timed Codes 40         SUBJECTIVE    Pain Level (0-10 scale): 0 rest, 3/10 with movement    Any medication changes, allergies to medications, adverse drug reactions, diagnosis change, or new procedure performed?: [x] No    [] Yes (see summary sheet for update)  Medications: Verified on Patient Summary List    Subjective functional status/changes:     Pt reports that her shoulder is better, but still stiff and painful with certain movements. She had some shoulder pain last night.     OBJECTIVE      Therapeutic Procedures:  Tx Min Billable or 1:1 Min (if diff from Tx Min) Procedure, Rationale, Specifics   30  88499 Therapeutic Exercise (timed):  increase ROM, strength, coordination, balance, and proprioception to improve patient's ability to progress to PLOF and address remaining functional goals. (see flow sheet as applicable)     Details if applicable:  reviewed HEP, added per flowsheet   10  26577 Manual Therapy (timed):  decrease pain, increase ROM, and increase tissue extensibility to improve patient's ability to progress to PLOF and address remaining functional goals.  The manual therapy interventions were performed at a separate and distinct time from the therapeutic activities interventions . (see flow sheet as applicable)     Details if applicable:  Inferior/Posterior GH jt mobs Gr II, PROM all planes, STM R UT, infraspinatus and subscapularis, teres major/minor mm   40     Total Total         Modalities

## 2024-08-12 ENCOUNTER — HOSPITAL ENCOUNTER (OUTPATIENT)
Facility: HOSPITAL | Age: 69
Setting detail: RECURRING SERIES
Discharge: HOME | End: 2024-08-15
Payer: COMMERCIAL

## 2024-08-12 PROCEDURE — 97140 MANUAL THERAPY 1/> REGIONS: CPT

## 2024-08-12 PROCEDURE — 97110 THERAPEUTIC EXERCISES: CPT

## 2024-08-12 NOTE — PROGRESS NOTES
PHYSICAL THERAPY - DAILY TREATMENT NOTE (updated 3/23)      Date: 2024          Patient Name:  Rachel Warner :  1955   Medical   Diagnosis:  Acute pain of right shoulder [M25.511] Treatment Diagnosis:  M25.511  RIGHT SHOULDER PAIN    Referral Source:  Reza Austin AP* Insurance:   Payor: [employer] / Plan: [employer] PPO / Product Type: *No Product type* /                     Patient  verified yes     Visit #   Current  / Total 7 24   Time   In / Out 1004 A 1054 A   Total Treatment Time 50   Total Timed Codes 40         SUBJECTIVE    Pain Level (0-10 scale): 0 rest, 3/10 with movement    Any medication changes, allergies to medications, adverse drug reactions, diagnosis change, or new procedure performed?: [x] No    [] Yes (see summary sheet for update)  Medications: Verified on Patient Summary List    Subjective functional status/changes:     Pt reports that her shoulder is getting better, but still has pain once she moves it a certain direction.     OBJECTIVE      Therapeutic Procedures:  Tx Min Billable or 1:1 Min (if diff from Tx Min) Procedure, Rationale, Specifics   30  13625 Therapeutic Exercise (timed):  increase ROM, strength, coordination, balance, and proprioception to improve patient's ability to progress to PLOF and address remaining functional goals. (see flow sheet as applicable)     Details if applicable:  reviewed HEP, added per flowsheet   10  30262 Manual Therapy (timed):  decrease pain, increase ROM, and increase tissue extensibility to improve patient's ability to progress to PLOF and address remaining functional goals.  The manual therapy interventions were performed at a separate and distinct time from the therapeutic activities interventions . (see flow sheet as applicable)     Details if applicable:  Inferior/Posterior GH jt mobs Gr II, PROM all planes, STM R UT, infraspinatus and subscapularis, teres major/minor mm   40     Total Total         Modalities Rationale:      none

## 2024-08-14 ENCOUNTER — HOSPITAL ENCOUNTER (OUTPATIENT)
Facility: HOSPITAL | Age: 69
Setting detail: RECURRING SERIES
Discharge: HOME | End: 2024-08-17
Payer: COMMERCIAL

## 2024-08-14 PROCEDURE — 97110 THERAPEUTIC EXERCISES: CPT | Performed by: PHYSICAL THERAPIST

## 2024-08-14 PROCEDURE — 97140 MANUAL THERAPY 1/> REGIONS: CPT | Performed by: PHYSICAL THERAPIST

## 2024-08-14 NOTE — PROGRESS NOTES
PHYSICAL THERAPY - DAILY TREATMENT NOTE (updated 3/23)      Date: 2024          Patient Name:  Rachel Warner :  1955   Medical   Diagnosis:  Acute pain of right shoulder [M25.511] Treatment Diagnosis:  M25.511  RIGHT SHOULDER PAIN    Referral Source:  Reza Austin AP* Insurance:   Payor: [employer] / Plan: [employer] PPO / Product Type: *No Product type* /                     Patient  verified yes     Visit #   Current  / Total 8 24   Time   In / Out 1030A 1124 A   Total Treatment Time 54   Total Timed Codes 44         SUBJECTIVE    Pain Level (0-10 scale): 0 rest, 3/10 with movement    Any medication changes, allergies to medications, adverse drug reactions, diagnosis change, or new procedure performed?: [x] No    [] Yes (see summary sheet for update)  Medications: Verified on Patient Summary List    Subjective functional status/changes:     Pt reports that she has some soreness in chest and posterior shoulder following previous PT appt. Pt reports that overall her pain is getting better and feels she has more movement. Notes her shoulder fatigues quickly and continues to be limited with lifting.     OBJECTIVE      Therapeutic Procedures:  Tx Min Billable or 1:1 Min (if diff from Tx Min) Procedure, Rationale, Specifics   34  71872 Therapeutic Exercise (timed):  increase ROM, strength, coordination, balance, and proprioception to improve patient's ability to progress to PLOF and address remaining functional goals. (see flow sheet as applicable)     Details if applicable:  reviewed HEP, added per flowsheet   10  26571 Manual Therapy (timed):  decrease pain, increase ROM, and increase tissue extensibility to improve patient's ability to progress to PLOF and address remaining functional goals.  The manual therapy interventions were performed at a separate and distinct time from the therapeutic activities interventions . (see flow sheet as applicable)     Details if applicable:  Inferior/Posterior GH jt

## 2024-08-14 NOTE — PROGRESS NOTES
Physical Therapy at [facility],   a part of [facility]  [address] [address]  Phone: [phone]  Fax: [phone]     PHYSICAL THERAPY PROGRESS NOTE  Patient Name:  Rachel Warner :  1955   Treatment/Medical Diagnosis: Acute pain of right shoulder [M25.511]   Referral Source:  Reza Austin, AP*     Date of Initial Visit:  7/15/24 Attended Visits:  8 Missed Visits:  0     SUMMARY OF TREATMENT/ASSESSMENT:  Pt has completed 8 skilled PT sessions for R shoulder pain and has made improvements with overall pain levels, ROM, and strength. She continues to have pain at end-ranges and AROM more limited than PROM. She has met STG at this time and is making progress towards LTG.   Patient will continue to benefit from skilled PT / OT services to modify and progress therapeutic interventions, analyze and address functional mobility deficits, analyze and address ROM deficits, analyze and address strength deficits, and analyze and address soft tissue restrictions to address functional deficits and attain remaining goals.  CURRENT STATUS/GOALS:    R Shoulder ROM:      AROM                         PROM              Flexion                         138                            170 p!                                       Abduction                    115                                         IR                                 L4                   ER                               suboccipitals                  90      Joint Mobility Assessment: Glenohumeral: hypomobile posterior and inferior directions                                        Flexibility: tight UT     UPPER QUARTER                             MUSCLE STRENGTH  KEY                                                                             R                      L  0 - No Contraction                               Flexion            4-                     5  1 - Trace

## 2024-08-16 DIAGNOSIS — I10 ESSENTIAL (PRIMARY) HYPERTENSION: ICD-10-CM

## 2024-08-16 DIAGNOSIS — E78.2 MIXED HYPERLIPIDEMIA: ICD-10-CM

## 2024-08-16 RX ORDER — AMLODIPINE BESYLATE 2.5 MG/1
2.5 TABLET ORAL DAILY
Qty: 90 TABLET | Refills: 1 | Status: SHIPPED | OUTPATIENT
Start: 2024-08-16

## 2024-08-16 RX ORDER — ATORVASTATIN CALCIUM 10 MG/1
10 TABLET, FILM COATED ORAL NIGHTLY
Qty: 90 TABLET | Refills: 2 | Status: SHIPPED | OUTPATIENT
Start: 2024-08-16

## 2024-08-19 ENCOUNTER — HOSPITAL ENCOUNTER (OUTPATIENT)
Facility: HOSPITAL | Age: 69
Setting detail: RECURRING SERIES
Discharge: HOME | End: 2024-08-22
Payer: COMMERCIAL

## 2024-08-19 PROCEDURE — 97140 MANUAL THERAPY 1/> REGIONS: CPT

## 2024-08-19 PROCEDURE — 97110 THERAPEUTIC EXERCISES: CPT

## 2024-08-19 NOTE — PROGRESS NOTES
PHYSICAL THERAPY - DAILY TREATMENT NOTE (updated 3/23)      Date: 2024          Patient Name:  Rachel Warner :  1955   Medical   Diagnosis:  Acute pain of right shoulder [M25.511] Treatment Diagnosis:  M25.511  RIGHT SHOULDER PAIN    Referral Source:  Reza Austin AP* Insurance:   Payor: [employer] / Plan: [employer] PPO / Product Type: *No Product type* /                     Patient  verified yes     Visit #   Current  / Total 9 24   Time   In / Out 1030 A 1125 A   Total Treatment Time 55   Total Timed Codes 45         SUBJECTIVE    Pain Level (0-10 scale): 0 currently    Any medication changes, allergies to medications, adverse drug reactions, diagnosis change, or new procedure performed?: [x] No    [] Yes (see summary sheet for update)  Medications: Verified on Patient Summary List    Subjective functional status/changes:     Pt reports that she was gardening this morning and has some pain from this. Was feeling really good this weekend.     OBJECTIVE      Therapeutic Procedures:  Tx Min Billable or 1:1 Min (if diff from Tx Min) Procedure, Rationale, Specifics   35  12566 Therapeutic Exercise (timed):  increase ROM, strength, coordination, balance, and proprioception to improve patient's ability to progress to PLOF and address remaining functional goals. (see flow sheet as applicable)     Details if applicable:  reviewed HEP, added per flowsheet   10  52728 Manual Therapy (timed):  decrease pain, increase ROM, and increase tissue extensibility to improve patient's ability to progress to PLOF and address remaining functional goals.  The manual therapy interventions were performed at a separate and distinct time from the therapeutic activities interventions . (see flow sheet as applicable)     Details if applicable:  Inferior/Posterior GH jt mobs Gr II, PROM all planes, STM R UT, infraspinatus and subscapularis, teres major/minor mm   45     Total Total         Modalities Rationale:     decrease

## 2024-08-21 ENCOUNTER — HOSPITAL ENCOUNTER (OUTPATIENT)
Facility: HOSPITAL | Age: 69
Setting detail: RECURRING SERIES
Discharge: HOME | End: 2024-08-24
Payer: COMMERCIAL

## 2024-08-21 PROCEDURE — 97110 THERAPEUTIC EXERCISES: CPT

## 2024-08-21 PROCEDURE — 97140 MANUAL THERAPY 1/> REGIONS: CPT

## 2024-08-21 NOTE — PROGRESS NOTES
PHYSICAL THERAPY - DAILY TREATMENT NOTE (updated 3/23)      Date: 2024          Patient Name:  Rachel Warner :  1955   Medical   Diagnosis:  Acute pain of right shoulder [M25.511] Treatment Diagnosis:  M25.511  RIGHT SHOULDER PAIN    Referral Source:  Reza Austin AP* Insurance:   Payor: [employer] / Plan: [employer] PPO / Product Type: *No Product type* /                     Patient  verified yes     Visit #   Current  / Total 10 24   Time   In / Out 1007 A 1055 A   Total Treatment Time 48   Total Timed Codes 48         SUBJECTIVE    Pain Level (0-10 scale): 0 currently    Any medication changes, allergies to medications, adverse drug reactions, diagnosis change, or new procedure performed?: [x] No    [] Yes (see summary sheet for update)  Medications: Verified on Patient Summary List    Subjective functional status/changes:     Pt reports that she is feeling good today.     OBJECTIVE      Therapeutic Procedures:  Tx Min Billable or 1:1 Min (if diff from Tx Min) Procedure, Rationale, Specifics   38  47668 Therapeutic Exercise (timed):  increase ROM, strength, coordination, balance, and proprioception to improve patient's ability to progress to PLOF and address remaining functional goals. (see flow sheet as applicable)     Details if applicable:  reviewed HEP, added per flowsheet   10  41123 Manual Therapy (timed):  decrease pain, increase ROM, and increase tissue extensibility to improve patient's ability to progress to PLOF and address remaining functional goals.  The manual therapy interventions were performed at a separate and distinct time from the therapeutic activities interventions . (see flow sheet as applicable)     Details if applicable:  Inferior/Posterior GH jt mobs Gr II, PROM all planes, STM R UT, infraspinatus and subscapularis, teres major/minor mm   48     Total Total         Modalities Rationale:     decrease inflammation and decrease pain to improve patient's ability to progress to

## 2024-08-27 ENCOUNTER — HOSPITAL ENCOUNTER (OUTPATIENT)
Facility: HOSPITAL | Age: 69
Setting detail: RECURRING SERIES
Discharge: HOME | End: 2024-08-30
Payer: COMMERCIAL

## 2024-08-27 PROCEDURE — 97110 THERAPEUTIC EXERCISES: CPT | Performed by: PHYSICAL THERAPIST

## 2024-08-27 PROCEDURE — 97140 MANUAL THERAPY 1/> REGIONS: CPT | Performed by: PHYSICAL THERAPIST

## 2024-08-27 NOTE — PROGRESS NOTES
progress to PLOF and address remaining functional goals.       min [] Estim Unattended,             type/location:       []  w/ice    []  w/heat        min [] Estim Attended,             type/location:       []  w/ice   []  w/heat         []  w/US   []  TENS insruct            min []  Mechanical Traction,        type/lbs:        []  pro      []  sup           []  int       []  cont            []  before manual           []  after manual     min []  Ultrasound,         settings/location:     decl min  unbilled [x]  Ice     []  Heat            location/position: R shoulder / seated         min []  Vasopneumatic Device,      press/temp:   pre-treatment girth :    post-treatment girth :    measured at (landmark       location) :   If using vaso (only need to measure limb vaso being performed on)        min []  Other:        Skin assessment post-treatment (if applicable):    [x]  intact    []  redness- no adverse reaction                 []redness - adverse reaction:          [x]  Patient Education billed concurrently with other procedures   [x] Review HEP    [] Progressed/Changed HEP, detail:    [] Other detail:         Other Objective/Functional Measures      Pain Level at end of session (0-10 scale): 0      Assessment   Good tolerance to strengthening progressions today. Continues to have TTP in teres major and minor musculature.    Patient will continue to benefit from skilled PT / OT services to modify and progress therapeutic interventions, analyze and address functional mobility deficits, analyze and address ROM deficits, analyze and address strength deficits, and analyze and address soft tissue restrictions to address functional deficits and attain remaining goals.    Progress toward goals / Updated goals:  []  See Progress Note/Recertification    Short Term Goals: To be accomplished in 4-6 treatments.  Pt will be ind with HEP. Met  Pt will have reduction of constant pain by 50%. Met  Pt will have 170 degrees  passive R shoulder flexion in preparation for overhead activities. Met  Long Term Goals: To be accomplished in 24 treatments. Progressing  Pt will have 160 degrees active R shoulder flexion without pain in order to reach into overhead cabinets.  Pt will be able to sleep 6-8 hours without waking from pain.  Pt will be able to don/doff shirt without increased pain.  Pt will improve FOTO by 10 pts for improved functional abilities.       PLAN  Yes  Continue plan of care  Re-Cert Due: -  [x]  Upgrade activities as tolerated  []  Discharge due to:  []  Other:      Madelyn Jones, PT       8/27/2024       11:10 AM

## 2024-08-29 ENCOUNTER — HOSPITAL ENCOUNTER (OUTPATIENT)
Facility: HOSPITAL | Age: 69
Setting detail: RECURRING SERIES
End: 2024-08-29
Payer: COMMERCIAL

## 2024-08-29 PROCEDURE — 97140 MANUAL THERAPY 1/> REGIONS: CPT

## 2024-08-29 PROCEDURE — 97110 THERAPEUTIC EXERCISES: CPT

## 2024-08-29 NOTE — PROGRESS NOTES
PHYSICAL THERAPY - DAILY TREATMENT NOTE (updated 3/23)      Date: 2024          Patient Name:  Rachel Warner :  1955   Medical   Diagnosis:  Acute pain of right shoulder [M25.511] Treatment Diagnosis:  M25.511  RIGHT SHOULDER PAIN    Referral Source:  Reza Austin AP* Insurance:   Payor: [employer] / Plan: [employer] PPO / Product Type: *No Product type* /                     Patient  verified yes     Visit #   Current  / Total 12 24   Time   In / Out 900 A 940 A   Total Treatment Time 40   Total Timed Codes 40         SUBJECTIVE    Pain Level (0-10 scale): 0/10    Any medication changes, allergies to medications, adverse drug reactions, diagnosis change, or new procedure performed?: [x] No    [] Yes (see summary sheet for update)  Medications: Verified on Patient Summary List    Subjective functional status/changes:     Pt reports that her shoulder is feeling good.     OBJECTIVE      Therapeutic Procedures:  Tx Min Billable or 1:1 Min (if diff from Tx Min) Procedure, Rationale, Specifics   30  71500 Therapeutic Exercise (timed):  increase ROM, strength, coordination, balance, and proprioception to improve patient's ability to progress to PLOF and address remaining functional goals. (see flow sheet as applicable)     Details if applicable:  reviewed HEP, added per flowsheet   10  63450 Manual Therapy (timed):  decrease pain, increase ROM, and increase tissue extensibility to improve patient's ability to progress to PLOF and address remaining functional goals.  The manual therapy interventions were performed at a separate and distinct time from the therapeutic activities interventions . (see flow sheet as applicable)     Details if applicable:  Inferior/Posterior GH jt mobs Gr II, PROM all planes, STM R UT, infraspinatus and subscapularis, teres major/minor mm   40     Total Total         Modalities Rationale:     decrease inflammation and decrease pain to improve patient's ability to progress to PLOF  preparation for overhead activities. Met  Long Term Goals: To be accomplished in 24 treatments. Progressing  Pt will have 160 degrees active R shoulder flexion without pain in order to reach into overhead cabinets.  Pt will be able to sleep 6-8 hours without waking from pain.  Pt will be able to don/doff shirt without increased pain.  Pt will improve FOTO by 10 pts for improved functional abilities.       PLAN  Yes  Continue plan of care  Re-Cert Due: -  [x]  Upgrade activities as tolerated  []  Discharge due to:  []  Other:      Jessica Bojorquez, PTA       8/29/2024       9:12 AM

## 2024-08-30 ENCOUNTER — TELEPHONE (OUTPATIENT)
Age: 69
End: 2024-08-30

## 2024-08-30 NOTE — TELEPHONE ENCOUNTER
Rescheduled patient's physical to 10/11/24 at 1:30.  Unable to unblock slot.  Per patient, her insurance said she is covered for a physical <365 days from her last one.

## 2024-08-30 NOTE — TELEPHONE ENCOUNTER
----- Message from Franko ENCISO sent at 8/30/2024  1:55 PM EDT -----  Regarding: [facility] Appointment Request  [facility] Appointment Request    Patient needs appointment for [facility] Appointment Type: Annual Visit.    Patient Requested Dates(s): as soon as possible  Patient Requested Time: as soon as possible  Provider Name: Patrizia Wolf MD      Reason for Appointment Request: Established Patient - Available appointments did not meet patient need  --------------------------------------------------------------------------------------------------------------------------    Relationship to Patient: Self     Call Back Information: OK to leave message on voicemail  Preferred Call Back Number: Phone [phone] (home)

## 2024-09-03 ENCOUNTER — HOSPITAL ENCOUNTER (OUTPATIENT)
Facility: HOSPITAL | Age: 69
Setting detail: RECURRING SERIES
Discharge: HOME | End: 2024-09-06
Payer: COMMERCIAL

## 2024-09-03 PROCEDURE — 97110 THERAPEUTIC EXERCISES: CPT

## 2024-09-03 PROCEDURE — 97140 MANUAL THERAPY 1/> REGIONS: CPT

## 2024-09-03 NOTE — PROGRESS NOTES
deficits, and analyze and address soft tissue restrictions to address functional deficits and attain remaining goals.    Progress toward goals / Updated goals:  []  See Progress Note/Recertification    Short Term Goals: To be accomplished in 4-6 treatments.  Pt will be ind with HEP. Met  Pt will have reduction of constant pain by 50%. Met  Pt will have 170 degrees passive R shoulder flexion in preparation for overhead activities. Met  Long Term Goals: To be accomplished in 24 treatments. Progressing  Pt will have 160 degrees active R shoulder flexion without pain in order to reach into overhead cabinets.  Pt will be able to sleep 6-8 hours without waking from pain.  Pt will be able to don/doff shirt without increased pain.  Pt will improve FOTO by 10 pts for improved functional abilities.       PLAN  Yes  Continue plan of care  Re-Cert Due: -  [x]  Upgrade activities as tolerated  []  Discharge due to:  []  Other:      Jessica Bojorquez, PTA       9/3/2024       10:45 AM

## 2024-09-05 ENCOUNTER — HOSPITAL ENCOUNTER (OUTPATIENT)
Facility: HOSPITAL | Age: 69
Setting detail: RECURRING SERIES
Discharge: HOME | End: 2024-09-08
Payer: COMMERCIAL

## 2024-09-05 PROCEDURE — 97110 THERAPEUTIC EXERCISES: CPT

## 2024-09-05 PROCEDURE — 97140 MANUAL THERAPY 1/> REGIONS: CPT

## 2024-09-09 ENCOUNTER — HOSPITAL ENCOUNTER (OUTPATIENT)
Facility: HOSPITAL | Age: 69
Setting detail: RECURRING SERIES
Discharge: HOME | End: 2024-09-12
Payer: COMMERCIAL

## 2024-09-09 PROCEDURE — 97110 THERAPEUTIC EXERCISES: CPT | Performed by: PHYSICAL THERAPIST

## 2024-09-09 PROCEDURE — 97140 MANUAL THERAPY 1/> REGIONS: CPT | Performed by: PHYSICAL THERAPIST

## 2024-09-09 RX ORDER — CITALOPRAM HYDROBROMIDE 10 MG/1
10 TABLET ORAL DAILY
Qty: 30 TABLET | Refills: 0 | Status: SHIPPED | OUTPATIENT
Start: 2024-09-09

## 2024-09-11 ENCOUNTER — HOSPITAL ENCOUNTER (OUTPATIENT)
Facility: HOSPITAL | Age: 69
Setting detail: RECURRING SERIES
Discharge: HOME | End: 2024-09-14
Payer: COMMERCIAL

## 2024-09-11 PROCEDURE — 97110 THERAPEUTIC EXERCISES: CPT

## 2024-09-11 PROCEDURE — 97140 MANUAL THERAPY 1/> REGIONS: CPT

## 2024-09-16 ENCOUNTER — HOSPITAL ENCOUNTER (OUTPATIENT)
Facility: HOSPITAL | Age: 69
Setting detail: RECURRING SERIES
Discharge: HOME | End: 2024-09-19
Payer: COMMERCIAL

## 2024-09-16 PROCEDURE — 97110 THERAPEUTIC EXERCISES: CPT | Performed by: PHYSICAL THERAPIST

## 2024-09-16 PROCEDURE — 97140 MANUAL THERAPY 1/> REGIONS: CPT | Performed by: PHYSICAL THERAPIST

## 2024-09-18 ENCOUNTER — HOSPITAL ENCOUNTER (OUTPATIENT)
Facility: HOSPITAL | Age: 69
Setting detail: RECURRING SERIES
Discharge: HOME | End: 2024-09-21
Payer: COMMERCIAL

## 2024-09-18 PROCEDURE — 97140 MANUAL THERAPY 1/> REGIONS: CPT | Performed by: PHYSICAL THERAPIST

## 2024-09-18 PROCEDURE — 97110 THERAPEUTIC EXERCISES: CPT | Performed by: PHYSICAL THERAPIST

## 2024-09-23 ENCOUNTER — HOSPITAL ENCOUNTER (OUTPATIENT)
Facility: HOSPITAL | Age: 69
Setting detail: RECURRING SERIES
Discharge: HOME | End: 2024-09-26
Payer: COMMERCIAL

## 2024-09-23 PROCEDURE — 97140 MANUAL THERAPY 1/> REGIONS: CPT | Performed by: PHYSICAL THERAPIST

## 2024-09-23 PROCEDURE — 97110 THERAPEUTIC EXERCISES: CPT | Performed by: PHYSICAL THERAPIST

## 2024-09-30 ENCOUNTER — HOSPITAL ENCOUNTER (OUTPATIENT)
Facility: HOSPITAL | Age: 69
Setting detail: RECURRING SERIES
Discharge: HOME | End: 2024-10-03
Payer: COMMERCIAL

## 2024-09-30 PROCEDURE — 97110 THERAPEUTIC EXERCISES: CPT | Performed by: PHYSICAL THERAPIST

## 2024-09-30 PROCEDURE — 97140 MANUAL THERAPY 1/> REGIONS: CPT | Performed by: PHYSICAL THERAPIST

## 2024-09-30 NOTE — PROGRESS NOTES
PHYSICAL THERAPY - DAILY TREATMENT NOTE (updated 3/23)      Date: 2024          Patient Name:  Rachel Warner :  1955   Medical   Diagnosis:  Acute pain of right shoulder [M25.511] Treatment Diagnosis:  M25.511  RIGHT SHOULDER PAIN    Referral Source:  Reza Austin AP* Insurance:   Payor: [employer] / Plan: [employer] PPO / Product Type: *No Product type* /                     Patient  verified yes     Visit #   Current  / Total 20 24   Time   In / Out 1030 A 1118 A   Total Treatment Time 48   Total Timed Codes 48         SUBJECTIVE    Pain Level (0-10 scale): 0/10    Any medication changes, allergies to medications, adverse drug reactions, diagnosis change, or new procedure performed?: [x] No    [] Yes (see summary sheet for update)  Medications: Verified on Patient Summary List    Subjective functional status/changes:     Pt reports no shoulder pain today with some soreness gardening over the weekend.     OBJECTIVE      Therapeutic Procedures:  Tx Min Billable or 1:1 Min (if diff from Tx Min) Procedure, Rationale, Specifics   38  23636 Therapeutic Exercise (timed):  increase ROM, strength, coordination, balance, and proprioception to improve patient's ability to progress to PLOF and address remaining functional goals. (see flow sheet as applicable)     Details if applicable:  reviewed HEP, added per flowsheet   10  81558 Manual Therapy (timed):  decrease pain, increase ROM, and increase tissue extensibility to improve patient's ability to progress to PLOF and address remaining functional goals.  The manual therapy interventions were performed at a separate and distinct time from the therapeutic activities interventions . (see flow sheet as applicable)     Details if applicable:  Inferior/Posterior GH jt mobs Gr II, PROM all planes, STM R UT, infraspinatus and subscapularis, teres major/minor mm   48     Total Total         Modalities Rationale:     decrease inflammation and decrease pain to improve

## 2024-10-07 ENCOUNTER — HOSPITAL ENCOUNTER (OUTPATIENT)
Facility: HOSPITAL | Age: 69
Setting detail: RECURRING SERIES
Discharge: HOME | End: 2024-10-10
Payer: COMMERCIAL

## 2024-10-07 PROCEDURE — 97140 MANUAL THERAPY 1/> REGIONS: CPT | Performed by: PHYSICAL THERAPIST

## 2024-10-07 PROCEDURE — 97110 THERAPEUTIC EXERCISES: CPT | Performed by: PHYSICAL THERAPIST

## 2024-10-07 NOTE — PROGRESS NOTES
PHYSICAL THERAPY - DAILY TREATMENT NOTE (updated 3/23)      Date: 10/7/2024          Patient Name:  Rachel Warner :  1955   Medical   Diagnosis:  Acute pain of right shoulder [M25.511] Treatment Diagnosis:  M25.511  RIGHT SHOULDER PAIN    Referral Source:  Reza Austin AP* Insurance:   Payor: [employer] / Plan: [employer] PPO / Product Type: *No Product type* /                     Patient  verified yes     Visit #   Current  / Total 21 24   Time   In / Out 1028 A 1108 A   Total Treatment Time 40   Total Timed Codes 40         SUBJECTIVE    Pain Level (0-10 scale): 0/10    Any medication changes, allergies to medications, adverse drug reactions, diagnosis change, or new procedure performed?: [x] No    [] Yes (see summary sheet for update)  Medications: Verified on Patient Summary List    Subjective functional status/changes:     Pt reports she is sore in both shoulders from gardening, but no pain.    OBJECTIVE      Therapeutic Procedures:  Tx Min Billable or 1:1 Min (if diff from Tx Min) Procedure, Rationale, Specifics   30  28709 Therapeutic Exercise (timed):  increase ROM, strength, coordination, balance, and proprioception to improve patient's ability to progress to PLOF and address remaining functional goals. (see flow sheet as applicable)     Details if applicable:  reviewed HEP, added per flowsheet   10  23136 Manual Therapy (timed):  decrease pain, increase ROM, and increase tissue extensibility to improve patient's ability to progress to PLOF and address remaining functional goals.  The manual therapy interventions were performed at a separate and distinct time from the therapeutic activities interventions . (see flow sheet as applicable)     Details if applicable:  Inferior/Posterior GH jt mobs Gr II, PROM all planes, STM R UT, infraspinatus and subscapularis, teres major/minor mm   40     Total Total         Modalities Rationale:     decrease inflammation and decrease pain to improve patient's 
         5                  5  4 - Good                                              ER                   4                     4+  5 - Normal                                     Progress toward goals / Updated goals:  []  See Progress Note/Recertification    Short Term Goals: To be accomplished in 4-6 treatments.  Pt will be ind with HEP. Met  Pt will have reduction of constant pain by 50%. Met  Pt will have 170 degrees passive R shoulder flexion in preparation for overhead activities. Met  Long Term Goals: To be accomplished in 24 treatments.   Pt will have 160 degrees active R shoulder flexion without pain in order to reach into overhead cabinets. Progressing  Pt will be able to sleep 6-8 hours without waking from pain. Met  Pt will be able to don/doff shirt without increased pain. Met  Pt will improve FOTO by 10 pts for improved functional abilities.  Met      RECOMMENDATIONS FOR SKILLED THERAPY:  Continue 1x/wk         Madelyn Jones, PT       10/7/2024       12:00 PM    If you have any questions/comments please contact us directly at [phone].   Thank you for allowing us to assist in the care of your patient.

## 2024-10-11 ENCOUNTER — OFFICE VISIT (OUTPATIENT)
Age: 69
End: 2024-10-11
Payer: COMMERCIAL

## 2024-10-11 VITALS
HEART RATE: 65 BPM | BODY MASS INDEX: 29.99 KG/M2 | TEMPERATURE: 97.9 F | DIASTOLIC BLOOD PRESSURE: 77 MMHG | WEIGHT: 186.6 LBS | HEIGHT: 66 IN | SYSTOLIC BLOOD PRESSURE: 131 MMHG | RESPIRATION RATE: 16 BRPM | OXYGEN SATURATION: 96 %

## 2024-10-11 DIAGNOSIS — E78.2 MIXED HYPERLIPIDEMIA: ICD-10-CM

## 2024-10-11 DIAGNOSIS — Z85.3 HISTORY OF BREAST CANCER: ICD-10-CM

## 2024-10-11 DIAGNOSIS — K21.9 GASTROESOPHAGEAL REFLUX DISEASE WITHOUT ESOPHAGITIS: ICD-10-CM

## 2024-10-11 DIAGNOSIS — Z00.00 ROUTINE PHYSICAL EXAMINATION: ICD-10-CM

## 2024-10-11 DIAGNOSIS — I10 ESSENTIAL HYPERTENSION: ICD-10-CM

## 2024-10-11 DIAGNOSIS — Z00.00 ROUTINE PHYSICAL EXAMINATION: Primary | ICD-10-CM

## 2024-10-11 DIAGNOSIS — R73.03 PREDIABETES: ICD-10-CM

## 2024-10-11 DIAGNOSIS — F41.1 GENERALIZED ANXIETY DISORDER: ICD-10-CM

## 2024-10-11 DIAGNOSIS — G47.33 OBSTRUCTIVE SLEEP APNEA SYNDROME: ICD-10-CM

## 2024-10-11 PROBLEM — Z71.89 ADVANCE DIRECTIVE DISCUSSED WITH PATIENT: Status: RESOLVED | Noted: 2017-06-28 | Resolved: 2024-10-11

## 2024-10-11 PROCEDURE — 3075F SYST BP GE 130 - 139MM HG: CPT | Performed by: INTERNAL MEDICINE

## 2024-10-11 PROCEDURE — 3078F DIAST BP <80 MM HG: CPT | Performed by: INTERNAL MEDICINE

## 2024-10-11 PROCEDURE — 99397 PER PM REEVAL EST PAT 65+ YR: CPT | Performed by: INTERNAL MEDICINE

## 2024-10-11 RX ORDER — IVERMECTIN 10 MG/G
CREAM TOPICAL
COMMUNITY
Start: 2024-09-05

## 2024-10-11 SDOH — ECONOMIC STABILITY: FOOD INSECURITY: WITHIN THE PAST 12 MONTHS, YOU WORRIED THAT YOUR FOOD WOULD RUN OUT BEFORE YOU GOT MONEY TO BUY MORE.: NEVER TRUE

## 2024-10-11 SDOH — ECONOMIC STABILITY: INCOME INSECURITY: HOW HARD IS IT FOR YOU TO PAY FOR THE VERY BASICS LIKE FOOD, HOUSING, MEDICAL CARE, AND HEATING?: NOT HARD AT ALL

## 2024-10-11 SDOH — ECONOMIC STABILITY: FOOD INSECURITY: WITHIN THE PAST 12 MONTHS, THE FOOD YOU BOUGHT JUST DIDN'T LAST AND YOU DIDN'T HAVE MONEY TO GET MORE.: NEVER TRUE

## 2024-10-11 ASSESSMENT — ENCOUNTER SYMPTOMS
VOMITING: 0
NAUSEA: 0
COUGH: 0
CONSTIPATION: 0
ABDOMINAL PAIN: 0
DIARRHEA: 0
EYES NEGATIVE: 1
SHORTNESS OF BREATH: 0

## 2024-10-11 NOTE — ASSESSMENT & PLAN NOTE
Now using CPAP, sees Dr Wong    Complaint of teeth clenching/ jaw pain in the mornings in recent months  May have worsened when taking steroid pack  Dentist is going to work on mouthguard  Can also discuss with sleep specialists  If not improving could consider meds or Botox

## 2024-10-11 NOTE — ASSESSMENT & PLAN NOTE
Hemoglobin A1C   Date Value Ref Range Status   06/20/2024 5.8 (H) 4.8 - 5.6 % Final     Comment:              Prediabetes: 5.7 - 6.4           Diabetes: >6.4           Glycemic control for adults with diabetes: <7.0       Discussed diet recs, will continue q6mo monitoring

## 2024-10-11 NOTE — PROGRESS NOTES
normal. There is no distension.      Palpations: Abdomen is soft.      Tenderness: There is no abdominal tenderness.   Musculoskeletal:      Cervical back: Normal range of motion and neck supple.      Right lower leg: No edema.      Left lower leg: No edema.   Lymphadenopathy:      Cervical: No cervical adenopathy.   Skin:     General: Skin is warm.   Neurological:      General: No focal deficit present.      Mental Status: She is alert and oriented to person, place, and time. Mental status is at baseline.   Psychiatric:         Mood and Affect: Mood normal.         Thought Content: Thought content normal.         Judgment: Judgment normal.            Vitals:    10/11/24 1329   BP: 131/77   Pulse: 65   Resp: 16   Temp: 97.9 °F (36.6 °C)   SpO2: 96%   Weight: 84.6 kg (186 lb 9.6 oz)   Height: 1.679 m (5' 6.1\")        Disclaimer:  Aspects of this note may have been generated using Dragon voice recognition software. Despite editing, there may be some syntax errors   We discussed the expected course, resolution and complications of the diagnosis(es) in detail.  I have discussed any significant medication side effects and warnings with the patient when indicated.  I advised them to contact the office if their condition worsens, changes or fails to improve as anticipated. Patient expressed understanding of the diagnosis and plan.    An electronic signature was used to authenticate this note.  -- Patrizia Wolf MD

## 2024-10-14 ENCOUNTER — HOSPITAL ENCOUNTER (OUTPATIENT)
Facility: HOSPITAL | Age: 69
Setting detail: RECURRING SERIES
Discharge: HOME | End: 2024-10-17
Payer: COMMERCIAL

## 2024-10-14 PROCEDURE — 97110 THERAPEUTIC EXERCISES: CPT | Performed by: PHYSICAL THERAPIST

## 2024-10-14 PROCEDURE — 97140 MANUAL THERAPY 1/> REGIONS: CPT | Performed by: PHYSICAL THERAPIST

## 2024-10-14 NOTE — PROGRESS NOTES
HEP. Met  Pt will have reduction of constant pain by 50%. Met  Pt will have 170 degrees passive R shoulder flexion in preparation for overhead activities. Met  Long Term Goals: To be accomplished in 24 treatments.   Pt will have 160 degrees active R shoulder flexion without pain in order to reach into overhead cabinets. Progressing  Pt will be able to sleep 6-8 hours without waking from pain. Met  Pt will be able to don/doff shirt without increased pain. Met  Pt will improve FOTO by 10 pts for improved functional abilities.  Met      PLAN  Yes  Continue plan of care  Re-Cert Due: -  [x]  Upgrade activities as tolerated  []  Discharge due to:  []  Other:      Madelyn Jones, PT       10/14/2024       10:36 AM

## 2024-10-16 LAB
ALBUMIN SERPL-MCNC: 4 G/DL (ref 3.9–4.9)
ALP SERPL-CCNC: 140 IU/L (ref 44–121)
ALT SERPL-CCNC: 27 IU/L (ref 0–32)
AST SERPL-CCNC: 21 IU/L (ref 0–40)
BASOPHILS # BLD AUTO: 0.1 X10E3/UL (ref 0–0.2)
BASOPHILS NFR BLD AUTO: 1 %
BILIRUB SERPL-MCNC: 0.4 MG/DL (ref 0–1.2)
BUN SERPL-MCNC: 14 MG/DL (ref 8–27)
BUN/CREAT SERPL: 18 (ref 12–28)
CALCIUM SERPL-MCNC: 9.3 MG/DL (ref 8.7–10.3)
CHLORIDE SERPL-SCNC: 105 MMOL/L (ref 96–106)
CHOLEST SERPL-MCNC: 154 MG/DL (ref 100–199)
CO2 SERPL-SCNC: 26 MMOL/L (ref 20–29)
CREAT SERPL-MCNC: 0.76 MG/DL (ref 0.57–1)
EGFRCR SERPLBLD CKD-EPI 2021: 86 ML/MIN/1.73
EOSINOPHIL # BLD AUTO: 0.1 X10E3/UL (ref 0–0.4)
EOSINOPHIL NFR BLD AUTO: 1 %
ERYTHROCYTE [DISTWIDTH] IN BLOOD BY AUTOMATED COUNT: 12.2 % (ref 11.7–15.4)
GLOBULIN SER CALC-MCNC: 2 G/DL (ref 1.5–4.5)
GLUCOSE SERPL-MCNC: 118 MG/DL (ref 70–99)
HBA1C MFR BLD: 6 % (ref 4.8–5.6)
HCT VFR BLD AUTO: 44 % (ref 34–46.6)
HDLC SERPL-MCNC: 43 MG/DL
HGB BLD-MCNC: 14.5 G/DL (ref 11.1–15.9)
IMM GRANULOCYTES # BLD AUTO: 0 X10E3/UL (ref 0–0.1)
IMM GRANULOCYTES NFR BLD AUTO: 0 %
LDLC SERPL CALC-MCNC: 86 MG/DL (ref 0–99)
LYMPHOCYTES # BLD AUTO: 1.5 X10E3/UL (ref 0.7–3.1)
LYMPHOCYTES NFR BLD AUTO: 31 %
MCH RBC QN AUTO: 31.4 PG (ref 26.6–33)
MCHC RBC AUTO-ENTMCNC: 33 G/DL (ref 31.5–35.7)
MCV RBC AUTO: 95 FL (ref 79–97)
MONOCYTES # BLD AUTO: 0.4 X10E3/UL (ref 0.1–0.9)
MONOCYTES NFR BLD AUTO: 9 %
NEUTROPHILS # BLD AUTO: 2.8 X10E3/UL (ref 1.4–7)
NEUTROPHILS NFR BLD AUTO: 58 %
PLATELET # BLD AUTO: 221 X10E3/UL (ref 150–450)
POTASSIUM SERPL-SCNC: 3.8 MMOL/L (ref 3.5–5.2)
PROT SERPL-MCNC: 6 G/DL (ref 6–8.5)
RBC # BLD AUTO: 4.62 X10E6/UL (ref 3.77–5.28)
SODIUM SERPL-SCNC: 143 MMOL/L (ref 134–144)
TRIGL SERPL-MCNC: 142 MG/DL (ref 0–149)
TSH SERPL DL<=0.005 MIU/L-ACNC: 3.91 UIU/ML (ref 0.45–4.5)
VLDLC SERPL CALC-MCNC: 25 MG/DL (ref 5–40)
WBC # BLD AUTO: 4.9 X10E3/UL (ref 3.4–10.8)

## 2024-10-17 RX ORDER — CITALOPRAM HYDROBROMIDE 10 MG/1
10 TABLET ORAL DAILY
Qty: 30 TABLET | Refills: 3 | Status: SHIPPED | OUTPATIENT
Start: 2024-10-17

## 2024-10-17 NOTE — TELEPHONE ENCOUNTER
Chief Complaint   Patient presents with    Medication Refill     Last Appointment with Dr. Patrizia Wolf:  10/11/2024   Future Appointments   Date Time Provider Department Center   12/3/2024  2:00 PM Herbert Wong MD [facility]

## 2024-12-03 ENCOUNTER — OFFICE VISIT (OUTPATIENT)
Age: 69
End: 2024-12-03
Payer: COMMERCIAL

## 2024-12-03 VITALS
BODY MASS INDEX: 30.17 KG/M2 | HEIGHT: 66 IN | DIASTOLIC BLOOD PRESSURE: 67 MMHG | WEIGHT: 187.7 LBS | OXYGEN SATURATION: 98 % | HEART RATE: 61 BPM | TEMPERATURE: 98.4 F | SYSTOLIC BLOOD PRESSURE: 118 MMHG

## 2024-12-03 DIAGNOSIS — I10 PRIMARY HYPERTENSION: ICD-10-CM

## 2024-12-03 DIAGNOSIS — G47.33 OSA (OBSTRUCTIVE SLEEP APNEA): Primary | ICD-10-CM

## 2024-12-03 PROCEDURE — 99213 OFFICE O/P EST LOW 20 MIN: CPT | Performed by: INTERNAL MEDICINE

## 2024-12-03 PROCEDURE — 1123F ACP DISCUSS/DSCN MKR DOCD: CPT | Performed by: INTERNAL MEDICINE

## 2024-12-03 PROCEDURE — 3078F DIAST BP <80 MM HG: CPT | Performed by: INTERNAL MEDICINE

## 2024-12-03 PROCEDURE — 3074F SYST BP LT 130 MM HG: CPT | Performed by: INTERNAL MEDICINE

## 2024-12-03 ASSESSMENT — SLEEP AND FATIGUE QUESTIONNAIRES
HOW LIKELY ARE YOU TO NOD OFF OR FALL ASLEEP WHEN YOU ARE A PASSENGER IN A CAR FOR AN HOUR WITHOUT A BREAK: MODERATE CHANCE OF DOZING
HOW LIKELY ARE YOU TO NOD OFF OR FALL ASLEEP IN A CAR, WHILE STOPPED FOR A FEW MINUTES IN TRAFFIC: WOULD NEVER DOZE
ESS TOTAL SCORE: 11
HOW LIKELY ARE YOU TO NOD OFF OR FALL ASLEEP WHILE SITTING QUIETLY AFTER LUNCH WITHOUT ALCOHOL: MODERATE CHANCE OF DOZING
HOW LIKELY ARE YOU TO NOD OFF OR FALL ASLEEP WHILE SITTING INACTIVE IN A PUBLIC PLACE: SLIGHT CHANCE OF DOZING
HOW LIKELY ARE YOU TO NOD OFF OR FALL ASLEEP WHILE SITTING AND TALKING TO SOMEONE: WOULD NEVER DOZE
HOW LIKELY ARE YOU TO NOD OFF OR FALL ASLEEP WHILE WATCHING TV: SLIGHT CHANCE OF DOZING
HOW LIKELY ARE YOU TO NOD OFF OR FALL ASLEEP WHILE SITTING AND READING: MODERATE CHANCE OF DOZING
HOW LIKELY ARE YOU TO NOD OFF OR FALL ASLEEP WHILE LYING DOWN TO REST IN THE AFTERNOON WHEN CIRCUMSTANCES PERMIT: HIGH CHANCE OF DOZING

## 2024-12-03 NOTE — PROGRESS NOTES
[address] [place], [place] [address]  Tel.  [phone]    Fax. [phone]     [address] [place], [place] [address]  Tel.  [phone]    Fax. [phone] [address] [place] [address]  Tel.  [phone]    Fax. [phone]       Rachel Warner (: 1955) is a 69 y.o. female, established patient, seen for positive airway pressure follow-up and evaluation of the following chief complaint(s):   Sleep Problem (Yearly F/U)       Rachel Warner was last seen by me on 23, prior notes reviewed in detail.  In lab sleep test 2017 showed AHI of 9.7/hr  with a lowest SaO2 of 88%. She was referred to dentistry for evaluation for OAT. Repeat Home Sleep Apnea Test (HSAT) performed on 23 with Oral Appliance Therapy showed an AHI of 13.2/hr with a lowest SpO2 of 83%, duration of SpO2 < 88% 0.4 minutes. She was switched from Oral Appliance Therapy to APAP Therapy.      ASSESSMENT/PLAN:   Diagnosis Orders   1. THAIS (obstructive sleep apnea)  DME Order for (Specify) as OP      2. Primary hypertension        3. BMI 30.0-30.9,adult            On ResMed:  AirSense 10 AutoSet    Settings:  Min EPAP:  06 cmH2O  Max EPAP: 15 cmH2O    EPR: 3    Sleep Apnea -   * She is adherent with PAP therapy and PAP continues to benefit patient and remains necessary for control of her sleep apnea.  Continue on current pressures    * Supplies for his device were ordered as noted below:    Orders Placed This Encounter   Procedures    DME Order for (Specify) as OP     Diagnosis: (G47.33) THAIS (obstructive sleep apnea)  (primary encounter diagnosis)     Replacement Supplies for Positive Airway Pressure Therapy Device:   Duration of need: 99 months.      Nasal Cushion (Replace) 2 per month.   Nasal Interface Mask 1 every 3 months.     Headgear 1 every 6 months.     Tubing with heating element 1 every 3 months.     Filter(s) Disposable 2 per month.

## 2024-12-04 ENCOUNTER — CLINICAL DOCUMENTATION (OUTPATIENT)
Age: 69
End: 2024-12-04

## 2025-01-05 ENCOUNTER — OFFICE VISIT (OUTPATIENT)
Age: 70
End: 2025-01-05

## 2025-01-05 VITALS
RESPIRATION RATE: 18 BRPM | WEIGHT: 188 LBS | TEMPERATURE: 98.3 F | OXYGEN SATURATION: 98 % | SYSTOLIC BLOOD PRESSURE: 150 MMHG | HEART RATE: 62 BPM | DIASTOLIC BLOOD PRESSURE: 80 MMHG | BODY MASS INDEX: 30.25 KG/M2

## 2025-01-05 DIAGNOSIS — J11.1 INFLUENZA: Primary | ICD-10-CM

## 2025-01-05 DIAGNOSIS — Z20.828 EXPOSURE TO INFLUENZA: ICD-10-CM

## 2025-01-05 PROBLEM — Z86.0100 HISTORY OF COLON POLYPS: Status: ACTIVE | Noted: 2025-01-05

## 2025-01-05 PROBLEM — R74.8 ABNORMAL LEVELS OF OTHER SERUM ENZYMES: Status: ACTIVE | Noted: 2025-01-05

## 2025-01-05 LAB
INFLUENZA A ANTIGEN, POC: NEGATIVE
INFLUENZA B ANTIGEN, POC: NEGATIVE

## 2025-01-05 RX ORDER — OSELTAMIVIR PHOSPHATE 75 MG/1
75 CAPSULE ORAL 2 TIMES DAILY
Qty: 10 CAPSULE | Refills: 0 | Status: SHIPPED | OUTPATIENT
Start: 2025-01-05 | End: 2025-01-10

## 2025-01-21 RX ORDER — CITALOPRAM HYDROBROMIDE 10 MG/1
10 TABLET ORAL DAILY
Qty: 30 TABLET | Refills: 3 | OUTPATIENT
Start: 2025-01-21

## 2025-01-21 RX ORDER — CITALOPRAM HYDROBROMIDE 10 MG/1
10 TABLET ORAL DAILY
Qty: 30 TABLET | Refills: 3 | Status: SHIPPED | OUTPATIENT
Start: 2025-01-21

## 2025-02-18 DIAGNOSIS — I10 ESSENTIAL (PRIMARY) HYPERTENSION: ICD-10-CM

## 2025-02-18 RX ORDER — AMLODIPINE BESYLATE 2.5 MG/1
2.5 TABLET ORAL DAILY
Qty: 90 TABLET | Refills: 0 | Status: SHIPPED | OUTPATIENT
Start: 2025-02-18

## 2025-03-04 ENCOUNTER — TELEMEDICINE (OUTPATIENT)
Age: 70
End: 2025-03-04
Payer: COMMERCIAL

## 2025-03-04 DIAGNOSIS — K76.0 FATTY LIVER: ICD-10-CM

## 2025-03-04 DIAGNOSIS — K21.9 GASTROESOPHAGEAL REFLUX DISEASE WITHOUT ESOPHAGITIS: Primary | ICD-10-CM

## 2025-03-04 PROCEDURE — 99213 OFFICE O/P EST LOW 20 MIN: CPT | Performed by: INTERNAL MEDICINE

## 2025-03-04 PROCEDURE — 1123F ACP DISCUSS/DSCN MKR DOCD: CPT | Performed by: INTERNAL MEDICINE

## 2025-03-04 RX ORDER — PANTOPRAZOLE SODIUM 40 MG/1
40 TABLET, DELAYED RELEASE ORAL DAILY
COMMUNITY
Start: 2025-02-04

## 2025-03-04 ASSESSMENT — ENCOUNTER SYMPTOMS
SHORTNESS OF BREATH: 0
ABDOMINAL PAIN: 0
COUGH: 0

## 2025-03-04 NOTE — ASSESSMENT & PLAN NOTE
She is now on pantoprazole per GI, discussed it shouldn't be stopped suddenly, may wean down or alternate with H2 blocker depending on how symptoms are, she will get final rec on course duration later this month  Discussed adding b12, she is already on Vit D and Mg

## 2025-03-04 NOTE — PROGRESS NOTES
3/4/2025    Nhjose Warner 1955 is a 69 y.o. year old female established patient,   here for video visit to evaluate the following chief complaint(s):  Chief Complaint   Patient presents with    Abnormal Lab           ASSESSMENT/PLAN:  Below is the assessment and plan developed based on review of pertinent history, physical exam, labs, studies, and medications.    1. Gastroesophageal reflux disease without esophagitis  Assessment & Plan:  She is now on pantoprazole per GI, discussed it shouldn't be stopped suddenly, may wean down or alternate with H2 blocker depending on how symptoms are, she will get final rec on course duration later this month  Discussed adding b12, she is already on Vit D and Mg  2. Fatty liver  Assessment & Plan:  I suspect mild alk phos elevation is consistent with history of fatty liver but agree with getting isoenzymes checked         Return for upcoming visit as scheduled.       SUBJECTIVE/OBJECTIVE:  Follow up from GI  Patient reports she recently had EGD/colo, result not immediately available, she states 1 polyp and they req 1 year follow up  Had some gastritis, biopsies negative, started her on pantoprazole which has been helping her digestive symptoms  They also did some labs for her as well  Alk phos was elevated (consistent with historical values)  Alk phos isoenzyme will be done and she will meet with GI PA later this month    Review of Systems   Constitutional:  Negative for chills and fever.   Respiratory:  Negative for cough and shortness of breath.    Cardiovascular:  Negative for chest pain, palpitations and leg swelling.   Gastrointestinal:  Negative for abdominal pain.   Skin:  Negative for rash.            Constitutional: [x] Appears well-developed and well-nourished [x] No apparent distress      [] Abnormal -     Mental status: [x] Alert and awake  [x] Oriented to person/place/time [x] Able to follow commands    [] Abnormal -     Eyes:   EOM    [x]  Normal    [] Abnormal -

## 2025-03-04 NOTE — ASSESSMENT & PLAN NOTE
I suspect mild alk phos elevation is consistent with history of fatty liver but agree with getting isoenzymes checked

## 2025-03-25 RX ORDER — CITALOPRAM HYDROBROMIDE 10 MG/1
10 TABLET ORAL DAILY
Qty: 30 TABLET | Refills: 3 | OUTPATIENT
Start: 2025-03-25

## 2025-03-26 RX ORDER — CITALOPRAM HYDROBROMIDE 10 MG/1
10 TABLET ORAL DAILY
Qty: 30 TABLET | Refills: 3 | OUTPATIENT
Start: 2025-03-26

## 2025-04-28 RX ORDER — CITALOPRAM HYDROBROMIDE 10 MG/1
10 TABLET ORAL DAILY
Qty: 30 TABLET | Refills: 3 | OUTPATIENT
Start: 2025-04-28

## 2025-05-12 DIAGNOSIS — I10 ESSENTIAL (PRIMARY) HYPERTENSION: ICD-10-CM

## 2025-05-12 RX ORDER — AMLODIPINE BESYLATE 2.5 MG/1
2.5 TABLET ORAL DAILY
Qty: 90 TABLET | Refills: 0 | Status: SHIPPED | OUTPATIENT
Start: 2025-05-12 | End: 2025-05-13 | Stop reason: SDUPTHER

## 2025-05-13 ENCOUNTER — OFFICE VISIT (OUTPATIENT)
Age: 70
End: 2025-05-13
Payer: MEDICARE

## 2025-05-13 VITALS
TEMPERATURE: 98 F | WEIGHT: 188 LBS | HEART RATE: 66 BPM | OXYGEN SATURATION: 96 % | SYSTOLIC BLOOD PRESSURE: 136 MMHG | BODY MASS INDEX: 30.22 KG/M2 | HEIGHT: 66 IN | DIASTOLIC BLOOD PRESSURE: 78 MMHG | RESPIRATION RATE: 16 BRPM

## 2025-05-13 DIAGNOSIS — F41.1 GENERALIZED ANXIETY DISORDER: ICD-10-CM

## 2025-05-13 DIAGNOSIS — I10 ESSENTIAL HYPERTENSION: Primary | ICD-10-CM

## 2025-05-13 DIAGNOSIS — I10 ESSENTIAL HYPERTENSION: ICD-10-CM

## 2025-05-13 DIAGNOSIS — R73.03 PREDIABETES: ICD-10-CM

## 2025-05-13 DIAGNOSIS — E78.2 MIXED HYPERLIPIDEMIA: ICD-10-CM

## 2025-05-13 PROCEDURE — 3017F COLORECTAL CA SCREEN DOC REV: CPT | Performed by: INTERNAL MEDICINE

## 2025-05-13 PROCEDURE — G8427 DOCREV CUR MEDS BY ELIG CLIN: HCPCS | Performed by: INTERNAL MEDICINE

## 2025-05-13 PROCEDURE — 1036F TOBACCO NON-USER: CPT | Performed by: INTERNAL MEDICINE

## 2025-05-13 PROCEDURE — 1159F MED LIST DOCD IN RCRD: CPT | Performed by: INTERNAL MEDICINE

## 2025-05-13 PROCEDURE — 3075F SYST BP GE 130 - 139MM HG: CPT | Performed by: INTERNAL MEDICINE

## 2025-05-13 PROCEDURE — 1123F ACP DISCUSS/DSCN MKR DOCD: CPT | Performed by: INTERNAL MEDICINE

## 2025-05-13 PROCEDURE — 3078F DIAST BP <80 MM HG: CPT | Performed by: INTERNAL MEDICINE

## 2025-05-13 PROCEDURE — 1090F PRES/ABSN URINE INCON ASSESS: CPT | Performed by: INTERNAL MEDICINE

## 2025-05-13 PROCEDURE — 1160F RVW MEDS BY RX/DR IN RCRD: CPT | Performed by: INTERNAL MEDICINE

## 2025-05-13 PROCEDURE — G8417 CALC BMI ABV UP PARAM F/U: HCPCS | Performed by: INTERNAL MEDICINE

## 2025-05-13 PROCEDURE — 99214 OFFICE O/P EST MOD 30 MIN: CPT | Performed by: INTERNAL MEDICINE

## 2025-05-13 PROCEDURE — G8399 PT W/DXA RESULTS DOCUMENT: HCPCS | Performed by: INTERNAL MEDICINE

## 2025-05-13 PROCEDURE — 1126F AMNT PAIN NOTED NONE PRSNT: CPT | Performed by: INTERNAL MEDICINE

## 2025-05-13 RX ORDER — ATORVASTATIN CALCIUM 10 MG/1
10 TABLET, FILM COATED ORAL NIGHTLY
Qty: 90 TABLET | Refills: 1 | Status: SHIPPED | OUTPATIENT
Start: 2025-05-13

## 2025-05-13 RX ORDER — CITALOPRAM HYDROBROMIDE 10 MG/1
10 TABLET ORAL DAILY
Qty: 90 TABLET | Refills: 1 | Status: SHIPPED | OUTPATIENT
Start: 2025-05-13

## 2025-05-13 RX ORDER — AMLODIPINE BESYLATE 2.5 MG/1
2.5 TABLET ORAL DAILY
Qty: 90 TABLET | Refills: 1 | Status: SHIPPED | OUTPATIENT
Start: 2025-05-13

## 2025-05-13 SDOH — ECONOMIC STABILITY: FOOD INSECURITY: WITHIN THE PAST 12 MONTHS, THE FOOD YOU BOUGHT JUST DIDN'T LAST AND YOU DIDN'T HAVE MONEY TO GET MORE.: NEVER TRUE

## 2025-05-13 SDOH — ECONOMIC STABILITY: FOOD INSECURITY: WITHIN THE PAST 12 MONTHS, YOU WORRIED THAT YOUR FOOD WOULD RUN OUT BEFORE YOU GOT MONEY TO BUY MORE.: NEVER TRUE

## 2025-05-13 ASSESSMENT — ENCOUNTER SYMPTOMS
COUGH: 0
SHORTNESS OF BREATH: 0
ABDOMINAL PAIN: 0

## 2025-05-13 ASSESSMENT — PATIENT HEALTH QUESTIONNAIRE - PHQ9
1. LITTLE INTEREST OR PLEASURE IN DOING THINGS: NOT AT ALL
SUM OF ALL RESPONSES TO PHQ QUESTIONS 1-9: 0
2. FEELING DOWN, DEPRESSED OR HOPELESS: NOT AT ALL
SUM OF ALL RESPONSES TO PHQ QUESTIONS 1-9: 0

## 2025-05-13 NOTE — ASSESSMENT & PLAN NOTE
Hemoglobin A1C   Date Value Ref Range Status   10/15/2024 6.0 (H) 4.8 - 5.6 % Final     Comment:              Prediabetes: 5.7 - 6.4           Diabetes: >6.4           Glycemic control for adults with diabetes: <7.0       Discussed diet recs, will continue q6mo monitoring

## 2025-05-13 NOTE — PROGRESS NOTES
5/13/2025    Rachel Warner 1955 is a 70 y.o. year old female established patient,   here for evaluation of the following chief complaint(s):  Chief Complaint   Patient presents with    Follow-up    Hypertension           ASSESSMENT/PLAN:  Below is the assessment and plan developed based on review of pertinent history, physical exam, labs, studies, and medications.    1. Essential hypertension  Assessment & Plan:   Controlled with current regimen, plan to continue  Orders:  -     Basic Metabolic Panel; Future  -     Albumin/Creatinine Ratio, Urine; Future  -     amLODIPine (NORVASC) 2.5 MG tablet; Take 1 tablet by mouth daily, Disp-90 tablet, R-1Normal  2. Mixed hyperlipidemia  Assessment & Plan:  Lab Results   Component Value Date    LDLDIRECT 77 12/04/2023        Tolerating statin therapy, plan to continue current regimen pending lab results  Recheck labs to assess for response to medication, whether LDL is at target, and monitor for side effects  Orders:  -     LDL Cholesterol, Direct; Future  -     atorvastatin (LIPITOR) 10 MG tablet; Take 1 tablet by mouth nightly, Disp-90 tablet, R-1Normal  3. Prediabetes  Assessment & Plan:  Hemoglobin A1C   Date Value Ref Range Status   10/15/2024 6.0 (H) 4.8 - 5.6 % Final     Comment:              Prediabetes: 5.7 - 6.4           Diabetes: >6.4           Glycemic control for adults with diabetes: <7.0       Discussed diet recs, will continue q6mo monitoring  Orders:  -     Hemoglobin A1C; Future  4. Generalized anxiety disorder  Assessment & Plan:  Mood symptoms controlled on current regimen and no adverse effects noted, plan to continue   Orders:  -     citalopram (CELEXA) 10 MG tablet; Take 1 tablet by mouth daily, Disp-90 tablet, R-1Normal         Return in about 6 months (around 11/13/2025) for annual medicare wellness exam, or sooner as needed.       SUBJECTIVE/OBJECTIVE:  Patient here for review of chronic conditions, with statuses as documented in Assessment and

## 2025-05-14 LAB
ANION GAP SERPL CALC-SCNC: 4 MMOL/L (ref 2–12)
BUN SERPL-MCNC: 12 MG/DL (ref 6–20)
BUN/CREAT SERPL: 16 (ref 12–20)
CALCIUM SERPL-MCNC: 9.6 MG/DL (ref 8.5–10.1)
CHLORIDE SERPL-SCNC: 110 MMOL/L (ref 97–108)
CO2 SERPL-SCNC: 30 MMOL/L (ref 21–32)
CREAT SERPL-MCNC: 0.73 MG/DL (ref 0.55–1.02)
CREAT UR-MCNC: 30.3 MG/DL
EST. AVERAGE GLUCOSE BLD GHB EST-MCNC: 120 MG/DL
GLUCOSE SERPL-MCNC: 113 MG/DL (ref 65–100)
HBA1C MFR BLD: 5.8 % (ref 4–5.6)
LDLC SERPL DIRECT ASSAY-MCNC: 76 MG/DL (ref 0–100)
MICROALBUMIN UR-MCNC: <0.5 MG/DL
MICROALBUMIN/CREAT UR-RTO: <17 MG/G (ref 0–30)
POTASSIUM SERPL-SCNC: 3.6 MMOL/L (ref 3.5–5.1)
SODIUM SERPL-SCNC: 144 MMOL/L (ref 136–145)
SPECIMEN HOLD: NORMAL

## 2025-05-15 ENCOUNTER — RESULTS FOLLOW-UP (OUTPATIENT)
Age: 70
End: 2025-05-15

## 2025-08-13 ENCOUNTER — OFFICE VISIT (OUTPATIENT)
Age: 70
End: 2025-08-13
Payer: MEDICARE

## 2025-08-13 VITALS
TEMPERATURE: 98.2 F | SYSTOLIC BLOOD PRESSURE: 136 MMHG | BODY MASS INDEX: 30.52 KG/M2 | HEIGHT: 66 IN | HEART RATE: 61 BPM | WEIGHT: 189.9 LBS | OXYGEN SATURATION: 96 % | RESPIRATION RATE: 15 BRPM | DIASTOLIC BLOOD PRESSURE: 77 MMHG

## 2025-08-13 DIAGNOSIS — R73.03 PREDIABETES: ICD-10-CM

## 2025-08-13 DIAGNOSIS — G57.93 NEUROPATHIC PAIN OF BOTH FEET: ICD-10-CM

## 2025-08-13 DIAGNOSIS — R79.89 OTHER SPECIFIED ABNORMAL FINDINGS OF BLOOD CHEMISTRY: ICD-10-CM

## 2025-08-13 DIAGNOSIS — G57.93 NEUROPATHIC PAIN OF BOTH FEET: Primary | ICD-10-CM

## 2025-08-13 PROCEDURE — G8417 CALC BMI ABV UP PARAM F/U: HCPCS

## 2025-08-13 PROCEDURE — 1125F AMNT PAIN NOTED PAIN PRSNT: CPT

## 2025-08-13 PROCEDURE — 1123F ACP DISCUSS/DSCN MKR DOCD: CPT

## 2025-08-13 PROCEDURE — 3078F DIAST BP <80 MM HG: CPT

## 2025-08-13 PROCEDURE — G8427 DOCREV CUR MEDS BY ELIG CLIN: HCPCS

## 2025-08-13 PROCEDURE — 3017F COLORECTAL CA SCREEN DOC REV: CPT

## 2025-08-13 PROCEDURE — 99213 OFFICE O/P EST LOW 20 MIN: CPT

## 2025-08-13 PROCEDURE — 1159F MED LIST DOCD IN RCRD: CPT

## 2025-08-13 PROCEDURE — 3075F SYST BP GE 130 - 139MM HG: CPT

## 2025-08-13 PROCEDURE — 1036F TOBACCO NON-USER: CPT

## 2025-08-13 PROCEDURE — 1090F PRES/ABSN URINE INCON ASSESS: CPT

## 2025-08-13 PROCEDURE — G8399 PT W/DXA RESULTS DOCUMENT: HCPCS

## 2025-08-13 RX ORDER — PANTOPRAZOLE SODIUM 20 MG/1
20 TABLET, DELAYED RELEASE ORAL DAILY
COMMUNITY

## 2025-08-13 ASSESSMENT — ENCOUNTER SYMPTOMS
VOMITING: 0
NAUSEA: 0
DIARRHEA: 0

## 2025-08-14 LAB
ANION GAP SERPL CALC-SCNC: 10 MMOL/L (ref 2–14)
BASOPHILS # BLD: 0.04 K/UL (ref 0–0.1)
BASOPHILS NFR BLD: 0.8 % (ref 0–1)
BUN SERPL-MCNC: 19 MG/DL (ref 8–23)
BUN/CREAT SERPL: 29 (ref 12–20)
CALCIUM SERPL-MCNC: 9.6 MG/DL (ref 8.8–10.2)
CHLORIDE SERPL-SCNC: 106 MMOL/L (ref 98–107)
CO2 SERPL-SCNC: 27 MMOL/L (ref 20–29)
CREAT SERPL-MCNC: 0.66 MG/DL (ref 0.6–1)
DIFFERENTIAL METHOD BLD: NORMAL
EOSINOPHIL # BLD: 0.04 K/UL (ref 0–0.4)
EOSINOPHIL NFR BLD: 0.8 % (ref 0–7)
ERYTHROCYTE [DISTWIDTH] IN BLOOD BY AUTOMATED COUNT: 12.4 % (ref 11.5–14.5)
EST. AVERAGE GLUCOSE BLD GHB EST-MCNC: 115 MG/DL
FERRITIN SERPL-MCNC: 136 NG/ML (ref 13–252)
GLUCOSE SERPL-MCNC: 105 MG/DL (ref 65–100)
HBA1C MFR BLD: 5.6 % (ref 4–5.6)
HCT VFR BLD AUTO: 42.9 % (ref 35–47)
HGB BLD-MCNC: 14.4 G/DL (ref 11.5–16)
IMM GRANULOCYTES # BLD AUTO: 0.01 K/UL (ref 0–0.04)
IMM GRANULOCYTES NFR BLD AUTO: 0.2 % (ref 0–0.5)
IRON SATN MFR SERPL: 43 %
IRON SERPL-MCNC: 132 UG/DL (ref 37–145)
LYMPHOCYTES # BLD: 1.83 K/UL (ref 0.8–3.5)
LYMPHOCYTES NFR BLD: 35.1 % (ref 12–49)
MCH RBC QN AUTO: 31 PG (ref 26–34)
MCHC RBC AUTO-ENTMCNC: 33.6 G/DL (ref 30–36.5)
MCV RBC AUTO: 92.3 FL (ref 80–99)
MONOCYTES # BLD: 0.31 K/UL (ref 0–1)
MONOCYTES NFR BLD: 6 % (ref 5–13)
NEUTS SEG # BLD: 2.98 K/UL (ref 1.8–8)
NEUTS SEG NFR BLD: 57.1 % (ref 32–75)
NRBC # BLD: 0 K/UL (ref 0–0.01)
NRBC BLD-RTO: 0 PER 100 WBC
PLATELET # BLD AUTO: 245 K/UL (ref 150–400)
PMV BLD AUTO: 11.5 FL (ref 8.9–12.9)
POTASSIUM SERPL-SCNC: 3.8 MMOL/L (ref 3.5–5.1)
RBC # BLD AUTO: 4.65 M/UL (ref 3.8–5.2)
SODIUM SERPL-SCNC: 143 MMOL/L (ref 136–145)
T4 FREE SERPL-MCNC: 1 NG/DL (ref 0.9–1.6)
TIBC SERPL-MCNC: 307 UG/DL (ref 250–450)
TSH, 3RD GENERATION: 4.27 UIU/ML (ref 0.27–4.2)
UIBC SERPL-MCNC: 175 UG/DL (ref 112–347)
WBC # BLD AUTO: 5.2 K/UL (ref 3.6–11)

## (undated) DEVICE — GLOVE ORANGE PI 7 1/2   MSG9075

## (undated) DEVICE — SUTURE MNCRYL STRATAFIX PS 4-0 30CM

## (undated) DEVICE — GLOVE SURG SZ 75 L12IN FNGR THK79MIL GRN LTX FREE

## (undated) DEVICE — REM POLYHESIVE ADULT PATIENT RETURN ELECTRODE: Brand: VALLEYLAB

## (undated) DEVICE — PENCIL SMK EVAC 10 FT BLADE ELECTRD ROCKER FOR TELSCP

## (undated) DEVICE — GARMENT,MEDLINE,DVT,INT,CALF,MED, GEN2: Brand: MEDLINE

## (undated) DEVICE — DERMABOND SKIN ADH 0.7ML -- DERMABOND ADVANCED 12/BX

## (undated) DEVICE — PREP SKN CHLRAPRP APL 26ML STR --

## (undated) DEVICE — PACK,LAPAROTOMY,2 REINFORCED GOWNS: Brand: MEDLINE

## (undated) DEVICE — HYPODERMIC SAFETY NEEDLE: Brand: MONOJECT

## (undated) DEVICE — BASIN ST MAJOR-NO CAUTERY: Brand: MEDLINE INDUSTRIES, INC.

## (undated) DEVICE — SUTURE VCRL SZ 3-0 L27IN ABSRB UD L26MM SH 1/2 CIR J416H

## (undated) DEVICE — SUTURE VCRL 0 L27IN ABSRB UD CT L40MM 1/2 CIR TAPERPOINT J280H